# Patient Record
Sex: FEMALE | Race: WHITE
[De-identification: names, ages, dates, MRNs, and addresses within clinical notes are randomized per-mention and may not be internally consistent; named-entity substitution may affect disease eponyms.]

---

## 2017-08-11 ENCOUNTER — HOSPITAL ENCOUNTER (OUTPATIENT)
Dept: HOSPITAL 53 - M LAB REF | Age: 62
End: 2017-08-11
Attending: NURSE PRACTITIONER
Payer: COMMERCIAL

## 2017-08-11 DIAGNOSIS — Z02.1: Primary | ICD-10-CM

## 2018-01-04 ENCOUNTER — HOSPITAL ENCOUNTER (OUTPATIENT)
Dept: HOSPITAL 53 - M LAB REF | Age: 63
End: 2018-01-04
Attending: NURSE PRACTITIONER
Payer: COMMERCIAL

## 2018-01-04 DIAGNOSIS — N76.0: Primary | ICD-10-CM

## 2020-05-19 ENCOUNTER — HOSPITAL ENCOUNTER (OUTPATIENT)
Dept: HOSPITAL 53 - M LAB REF | Age: 65
End: 2020-05-19
Attending: NURSE PRACTITIONER
Payer: MEDICARE

## 2020-05-19 DIAGNOSIS — N39.0: Primary | ICD-10-CM

## 2020-06-03 ENCOUNTER — HOSPITAL ENCOUNTER (OUTPATIENT)
Dept: HOSPITAL 53 - M LAB REF | Age: 65
End: 2020-06-03
Attending: NURSE PRACTITIONER
Payer: MEDICARE

## 2020-06-03 DIAGNOSIS — N39.0: Primary | ICD-10-CM

## 2020-06-03 LAB
APPEARANCE UR: CLEAR
BACTERIA UR QL AUTO: NEGATIVE
BILIRUB UR QL STRIP.AUTO: NEGATIVE
GLUCOSE UR QL STRIP.AUTO: NEGATIVE MG/DL
HGB UR QL STRIP.AUTO: NEGATIVE
KETONES UR QL STRIP.AUTO: NEGATIVE MG/DL
LEUKOCYTE ESTERASE UR QL STRIP.AUTO: NEGATIVE
NITRITE UR QL STRIP.AUTO: NEGATIVE
PH UR STRIP.AUTO: 6 UNITS (ref 5–9)
PROT UR QL STRIP.AUTO: NEGATIVE MG/DL
RBC # UR AUTO: 1 /HPF (ref 0–3)
SP GR UR STRIP.AUTO: 1 (ref 1–1.03)
SQUAMOUS #/AREA URNS AUTO: 0 /HPF (ref 0–6)
UROBILINOGEN UR QL STRIP.AUTO: 0.2 MG/DL (ref 0–2)
WBC #/AREA URNS AUTO: 0 /HPF (ref 0–3)

## 2020-06-16 ENCOUNTER — HOSPITAL ENCOUNTER (OUTPATIENT)
Dept: HOSPITAL 53 - M WHC | Age: 65
End: 2020-06-16
Attending: NURSE PRACTITIONER
Payer: MEDICARE

## 2020-06-16 DIAGNOSIS — Z12.31: Primary | ICD-10-CM

## 2020-06-16 DIAGNOSIS — M85.80: ICD-10-CM

## 2020-06-16 NOTE — REPMRS
Patient History

The patient states she had a clinical breast exam in May 

2020.Family history of breast cancer at age 40 in maternal 

cousin.

 

Digital Woman Screen Mammo: June 16, 2020 - Exam #: 

[id number]

Bilateral CC and MLO view(s) were taken.

 

Technologist: Louann Galvez, Technologist

Prior study comparison: January 9, 2018, digital woman screen 

mammo performed at [facility] 

[place].  November 3, 2016, bilateral digital mammo screening 

bilat, performed at [facility].  April 21, 2014, 

bilateral digital mammo screening bilat, performed at [facility].

 

FINDINGS: The breast tissue is heterogeneously dense.  This may 

lower the sensitivity of mammography.  The Volpara volumetric 

breast density category is: C. There is a stable nodular density 

on the right unchanged from multiple prior studies. There is a 

moderate amount of heterogeneously dense fibroglandular tissue 

which is fairly symmetric. There is no interval development of 

dominant mass, architectural distortion, or grouped 

microcalcification typical of malignancy. There has been no 

change in the appearance of the mammogram from the prior studies.

3-D tomosynthesis shows no additional findings.

 

Assessment: BI-RADS/ACR category 2 mammogram. Benign Findings.

 

Recommendation

Routine screening mammogram of both breasts in 1 year (for women 

over age 40).

This patient's Lifetime Breast Cancer RIsk is estimated at 3.6 %.

This mammogram was interpreted with the aid of an FDA-approved 

computer-aided dectection system.

 

Electronically Signed By: Erlin Dalton MD 06/16/20 0846

## 2020-06-18 NOTE — DEXA
AP SPINE   L1 - L4   1.034   -1.3       0.3

LT FEMUR   TOTAL   0.873   -1.1       0.2

LT NECK      0.819   -1.6      -0.1

RT FEMUR   TOTAL   0.898   -0.9       0.4      

RT NECK      0.821   -1.6      -0.1

TOTAL BODY   TOTAL

OTHER



COMMENTS:

There is low bone density of the spine and hips.

The density of the spine has increased 5.5% since the initial exam on 
06/11/1998.

The increased 10.6% since the most recent exam on 01/09/2018.

The density of the left hip has decreased 2.3% since the initial exam on 
06/11/1998.

The density of the left hip is 0% since the most recent exam on 01/19/2018.

The density of the right hip has decreased 3.8% since the initial exam on 
06/11/1998.

The density of the right hip has increased 4.5% since the most recent exam on 
01/19/2018.



FOLLOW-UP:

Recommendation for the next bone density exam: 2 years.



NO

## 2020-12-03 ENCOUNTER — HOSPITAL ENCOUNTER (OUTPATIENT)
Dept: HOSPITAL 53 - M LABSMTC | Age: 65
End: 2020-12-03
Attending: PEDIATRICS
Payer: SELF-PAY

## 2020-12-03 DIAGNOSIS — Z20.828: Primary | ICD-10-CM

## 2020-12-31 ENCOUNTER — HOSPITAL ENCOUNTER (OUTPATIENT)
Dept: HOSPITAL 53 - M WUC | Age: 65
End: 2020-12-31
Attending: NURSE PRACTITIONER
Payer: SELF-PAY

## 2020-12-31 DIAGNOSIS — N39.0: Primary | ICD-10-CM

## 2021-03-16 ENCOUNTER — HOSPITAL ENCOUNTER (OUTPATIENT)
Dept: HOSPITAL 53 - M LAB | Age: 66
End: 2021-03-16
Attending: INTERNAL MEDICINE
Payer: MEDICARE

## 2021-03-16 DIAGNOSIS — K50.812: Primary | ICD-10-CM

## 2021-03-16 DIAGNOSIS — Z11.59: ICD-10-CM

## 2021-03-16 LAB
BASOPHILS # BLD AUTO: 0.1 10^3/UL (ref 0–0.2)
BASOPHILS NFR BLD AUTO: 0.6 % (ref 0–1)
CRP SERPL-MCNC: 0.66 MG/DL (ref 0–0.3)
EOSINOPHIL # BLD AUTO: 0.1 10^3/UL (ref 0–0.5)
EOSINOPHIL NFR BLD AUTO: 1.1 % (ref 0–3)
ERYTHROCYTE [SEDIMENTATION RATE] IN BLOOD BY WESTERGREN METHOD: 44 MM/HR (ref 0–30)
FOLATE SERPL-MCNC: 20.3 NG/ML
HCT VFR BLD AUTO: 36.6 % (ref 36–47)
HGB BLD-MCNC: 11.7 G/DL (ref 12–15.5)
IRON SATN MFR SERPL: 9.7 % (ref 13.2–45)
IRON SERPL-MCNC: 37 UG/DL (ref 50–170)
LYMPHOCYTES # BLD AUTO: 2.1 10^3/UL (ref 1.5–5)
LYMPHOCYTES NFR BLD AUTO: 20.5 % (ref 24–44)
MCH RBC QN AUTO: 27.1 PG (ref 27–33)
MCHC RBC AUTO-ENTMCNC: 32 G/DL (ref 32–36.5)
MCV RBC AUTO: 84.7 FL (ref 80–96)
MONOCYTES # BLD AUTO: 0.7 10^3/UL (ref 0–0.8)
MONOCYTES NFR BLD AUTO: 7 % (ref 2–8)
NEUTROPHILS # BLD AUTO: 7.1 10^3/UL (ref 1.5–8.5)
NEUTROPHILS NFR BLD AUTO: 70.4 % (ref 36–66)
PLATELET # BLD AUTO: 510 10^3/UL (ref 150–450)
RBC # BLD AUTO: 4.32 10^6/UL (ref 4–5.4)
TIBC SERPL-MCNC: 383 UG/DL (ref 250–450)
VIT B12 SERPL-MCNC: 764 PG/ML
WBC # BLD AUTO: 10.1 10^3/UL (ref 4–10)

## 2021-04-01 ENCOUNTER — HOSPITAL ENCOUNTER (OUTPATIENT)
Dept: HOSPITAL 53 - M LAB | Age: 66
End: 2021-04-01
Attending: INTERNAL MEDICINE
Payer: MEDICARE

## 2021-04-01 DIAGNOSIS — K50.812: Primary | ICD-10-CM

## 2021-04-01 DIAGNOSIS — Z93.2: ICD-10-CM

## 2021-04-01 LAB
BUN SERPL-MCNC: 16 MG/DL (ref 7–18)
CREAT SERPL-MCNC: 0.8 MG/DL (ref 0.55–1.3)
GFR SERPL CREATININE-BSD FRML MDRD: > 60 ML/MIN/{1.73_M2} (ref 45–?)

## 2021-04-07 ENCOUNTER — HOSPITAL ENCOUNTER (OUTPATIENT)
Dept: HOSPITAL 53 - M RAD | Age: 66
End: 2021-04-07
Attending: INTERNAL MEDICINE
Payer: MEDICARE

## 2021-04-07 DIAGNOSIS — Z90.49: ICD-10-CM

## 2021-04-07 DIAGNOSIS — K50.812: Primary | ICD-10-CM

## 2021-04-07 PROCEDURE — 74177 CT ABD & PELVIS W/CONTRAST: CPT

## 2021-04-07 NOTE — REP
INDICATION:

CROHN'S DX



COMPARISON:

03/09/2014.



TECHNIQUE:

CT Scan of the abdomen and pelvis was performed with intravenous administration of 100

cc of Isovue 370, and volumen oral contrast. Sagittal and coronal reconstruction

images are performed.



FINDINGS:

Lung bases: Unremarkable.

Liver:  Normal

Gallbladder: Prior cholecystectomy.  There is expected prominence of the intrahepatic

and extrahepatic bile ducts.  The common bile duct measures 11 mm in maximum diameter.

Spleen:  Normal.

Adrenals:  Normal.

Pancreas: Normal.

Kidneys:  Normal.

Small and large bowel: There has been a prior colectomy.  A right ileostomy is

present.  There is a suture line in the distal ileum, in the pelvis just to the left

of midline.  Just proximal to the suture line there is appears to be an area of focal

wall thickening and moderate luminal narrowing, with moderate dilatation of the more

proximal small bowel loop.  There also appears to be focal wall thickening and

suspected stricture ring of the ileum just distal to the suture line.  Another focal

area of wall thickening and stricturing is suspected approximately 5 cm distal to

that.  There is diffuse wall thickening of the more distal ileum with suspected focal

stricture of the distal ileum located in the more inferior ventral hernia.

Free fluid:  None.

Abdominal aorta: No aneurysm or dissection.

Adenopathy: None.

Appendix: Not inflamed.

Osseous structures:  There are degenerative changes of the spine without compression

deformity.

Pelvis: No mass.

There are 3 wide ventral hernias containing noninflamed fat and bowel loops.



IMPRESSION:

Postsurgical changes with several areas of mucosal fold and wall thickening of distal

ileum, and intermittent moderate small bowel dilatation, suggesting partial

obstruction at these areas of luminal narrowing and strictures.





<Electronically signed by Merrill Cuadra > 04/07/21 [id number]

## 2021-04-22 ENCOUNTER — HOSPITAL ENCOUNTER (OUTPATIENT)
Dept: HOSPITAL 53 - M INFU | Age: 66
Discharge: HOME | End: 2021-04-22
Attending: INTERNAL MEDICINE
Payer: MEDICARE

## 2021-04-22 VITALS — DIASTOLIC BLOOD PRESSURE: 63 MMHG | SYSTOLIC BLOOD PRESSURE: 131 MMHG

## 2021-04-22 VITALS — WEIGHT: 116.23 LBS | BODY MASS INDEX: 22.23 KG/M2 | HEIGHT: 60.5 IN

## 2021-04-22 DIAGNOSIS — K50.90: Primary | ICD-10-CM

## 2021-04-22 DIAGNOSIS — Z88.1: ICD-10-CM

## 2021-04-22 PROCEDURE — 96365 THER/PROPH/DIAG IV INF INIT: CPT

## 2021-06-28 ENCOUNTER — HOSPITAL ENCOUNTER (OUTPATIENT)
Dept: HOSPITAL 53 - M RAD | Age: 66
End: 2021-06-28
Attending: INTERNAL MEDICINE
Payer: MEDICARE

## 2021-06-28 DIAGNOSIS — R16.1: Primary | ICD-10-CM

## 2021-06-28 NOTE — REP
INDICATION:

UPPER ABD PAIN LIVER SPLEEN ? SPLEENOMEGALY.



COMPARISON:

None.



TECHNIQUE:

Transabdominal ultrasound



FINDINGS:

Multiple ultrasonographic images of the liver show the hepatic parenchymal echo

pattern to be within normal limits.  There is no intrahepatic or extrahepatic ductal

dilatation.  The common bile duct measures 7 mm.  The imaged portion of the pancreas

is within normal limits.

The spleen measures  8.9 x 7.4 x4.8 cm.  The volumetric index calculation is 316.

This is within the normal range.  No splenic or perisplenic abnormalities are noted.

The right kidney measures 9.9 x 5.6 x 3.6 cm.  The renal cortical echotexture is

within normal limits.  Corticomedullary differentiation is preserved.  There is no

hydronephrosis.  There are no masses.  The left kidney measures 9.6 x 3.7 x 5.1 cm.

The renal cortical echotexture is within normal limits.  Corticomedullary

differentiation is preserved.  There is no hydronephrosis.  There are no masses.  The

imaged portion of the abdominal aorta is within normal limits.  There is no evidence

of free fluid.



IMPRESSION:

Within normal limits





<Electronically signed by Lj Leone > 06/28/21 0979

## 2021-08-05 ENCOUNTER — HOSPITAL ENCOUNTER (OUTPATIENT)
Dept: HOSPITAL 53 - M LAB | Age: 66
End: 2021-08-05
Attending: INTERNAL MEDICINE
Payer: MEDICARE

## 2021-08-05 DIAGNOSIS — K50.812: Primary | ICD-10-CM

## 2021-08-05 LAB
ALBUMIN SERPL BCG-MCNC: 3.3 GM/DL (ref 3.2–5.2)
ALT SERPL W P-5'-P-CCNC: 30 U/L (ref 12–78)
BASOPHILS # BLD AUTO: 0.1 10^3/UL (ref 0–0.2)
BASOPHILS NFR BLD AUTO: 0.6 % (ref 0–1)
BILIRUB CONJ SERPL-MCNC: 0.1 MG/DL (ref 0–0.2)
BILIRUB SERPL-MCNC: 0.4 MG/DL (ref 0.2–1)
BUN SERPL-MCNC: 13 MG/DL (ref 7–18)
CALCIUM SERPL-MCNC: 9.6 MG/DL (ref 8.8–10.2)
CHLORIDE SERPL-SCNC: 106 MEQ/L (ref 98–107)
CO2 SERPL-SCNC: 28 MEQ/L (ref 21–32)
CREAT SERPL-MCNC: 0.84 MG/DL (ref 0.55–1.3)
CRP SERPL-MCNC: 0.64 MG/DL (ref 0–0.3)
EOSINOPHIL # BLD AUTO: 0.1 10^3/UL (ref 0–0.5)
EOSINOPHIL NFR BLD AUTO: 0.7 % (ref 0–3)
ERYTHROCYTE [SEDIMENTATION RATE] IN BLOOD BY WESTERGREN METHOD: 39 MM/HR (ref 0–30)
FOLATE SERPL-MCNC: > 24 NG/ML
GFR SERPL CREATININE-BSD FRML MDRD: > 60 ML/MIN/{1.73_M2} (ref 45–?)
GLUCOSE SERPL-MCNC: 126 MG/DL (ref 70–100)
HCT VFR BLD AUTO: 36.9 % (ref 36–47)
HGB BLD-MCNC: 11.9 G/DL (ref 12–15.5)
IRON SATN MFR SERPL: 7.8 % (ref 13.2–45)
IRON SERPL-MCNC: 33 UG/DL (ref 50–170)
LYMPHOCYTES # BLD AUTO: 2.3 10^3/UL (ref 1.5–5)
LYMPHOCYTES NFR BLD AUTO: 21.9 % (ref 24–44)
MCH RBC QN AUTO: 28.8 PG (ref 27–33)
MCHC RBC AUTO-ENTMCNC: 32.2 G/DL (ref 32–36.5)
MCV RBC AUTO: 89.3 FL (ref 80–96)
MONOCYTES # BLD AUTO: 0.9 10^3/UL (ref 0–0.8)
MONOCYTES NFR BLD AUTO: 8.1 % (ref 2–8)
NEUTROPHILS # BLD AUTO: 7.1 10^3/UL (ref 1.5–8.5)
NEUTROPHILS NFR BLD AUTO: 68.2 % (ref 36–66)
PLATELET # BLD AUTO: 454 10^3/UL (ref 150–450)
POTASSIUM SERPL-SCNC: 4.4 MEQ/L (ref 3.5–5.1)
PROT SERPL-MCNC: 6.9 GM/DL (ref 6.4–8.2)
RBC # BLD AUTO: 4.13 10^6/UL (ref 4–5.4)
SODIUM SERPL-SCNC: 140 MEQ/L (ref 136–145)
TIBC SERPL-MCNC: 424 UG/DL (ref 250–450)
VIT B12 SERPL-MCNC: 598 PG/ML
WBC # BLD AUTO: 10.4 10^3/UL (ref 4–10)

## 2021-08-16 LAB — CALPROTECTIN STL-MCNT: 40 UG/G (ref 0–120)

## 2021-08-17 LAB — ELASTASE PANC STL-MCNT: (no result) UG/G

## 2021-08-19 ENCOUNTER — HOSPITAL ENCOUNTER (OUTPATIENT)
Dept: HOSPITAL 53 - M RAD | Age: 66
End: 2021-08-19
Attending: INTERNAL MEDICINE
Payer: MEDICARE

## 2021-08-19 DIAGNOSIS — K50.812: Primary | ICD-10-CM

## 2021-10-23 ENCOUNTER — HOSPITAL ENCOUNTER (OUTPATIENT)
Dept: HOSPITAL 53 - M LABSMTC | Age: 66
End: 2021-10-23
Attending: ANESTHESIOLOGY
Payer: MEDICARE

## 2021-10-23 DIAGNOSIS — Z20.822: ICD-10-CM

## 2021-10-23 DIAGNOSIS — Z01.812: Primary | ICD-10-CM

## 2021-10-25 ENCOUNTER — HOSPITAL ENCOUNTER (OUTPATIENT)
Dept: HOSPITAL 53 - M WHC | Age: 66
End: 2021-10-25
Attending: NURSE PRACTITIONER
Payer: MEDICARE

## 2021-10-25 DIAGNOSIS — Z80.3: ICD-10-CM

## 2021-10-25 DIAGNOSIS — Z12.31: Primary | ICD-10-CM

## 2021-10-25 NOTE — REPMRS
Patient History

The patient states she had a clinical breast exam in October 2021.

Family history of breast cancer at age 40 in maternal cousin.

Patient states no breast complaints today. Patient has signed MRS

History Sheet.

 

Digital Woman Screen Mammo: October 25, 2021 - Exam #: 

[id number]

Bilateral CC and MLO view(s) were taken.

 

Technologist: Jenny Baumann, Technologist

Prior study comparison: June 16, 2020, bilateral digital woman 

screen mammo performed at [facility] 

[facility].  January 9, 2018, digital woman screen mammo performed at 

[facility].

 

FINDINGS: There are scattered fibroglandular densities.  

Screening.

 

Digital screening (2D) mammography was performed bilaterally in 

the CC and MLO projections. Additionally, breast tomosynthesis 

(3D mammography) was performed bilaterally in the CC and MLO 

projections. Todays exam was compared to the prior exam/exams.

 

By history, the patient has no complaints of a palpable breast 

abnormality or other significant breast complaints.

 

The Volpara volumetric breast density category is B, there are 

scattered areas of fibroglandular densities.

 

The breasts are unchanged in size and shape. There are no 

tj-soft tissue densities or spiculated masses. There is no 

internal architectural distortion. There are no suspicious 

tj-calcific clusters. Skin thickening or nipple retraction is 

not present.

 

IMPRESSION:

 

BI-RADS Category 2- Benign Findings. There is no evidence of 

malignant alteration of the breasts. Followup examination 

recommended in one year.

 

The lifetime Tyrer-Cuzick score is  3.4%

 

This mammogram was read with the assistance of [employer] Power[employer] 

AMP,an FDA approved computer aided detection system for 

mammography.

 

Negative x-ray reports should not delay surgical consultation if 

a dominant or clinically suspicious mass is present.

 

Not all breast cancers can be identified by mammography. 

Therefore, we recommend that you continue to perform regular 

breast self-examination and physical examination and then 

promptly contact your physician of any concerns or changes.

 

Adenosis and dense breasts may obscure an underlying neoplasm.

No significant changes when compared with prior studies.

 

Assessment: BI-RADS/ACR category 2 mammogram. Benign Findings.

 

Recommendation

Routine screening mammogram of both breasts in 1 year.

 

Electronically Signed By: Armando Andrade MD 10/25/21 9555

## 2021-10-28 ENCOUNTER — HOSPITAL ENCOUNTER (OUTPATIENT)
Dept: HOSPITAL 53 - M OPP | Age: 66
Discharge: HOME | End: 2021-10-28
Attending: INTERNAL MEDICINE
Payer: MEDICARE

## 2021-10-28 VITALS — HEIGHT: 60.6 IN | BODY MASS INDEX: 23.49 KG/M2 | WEIGHT: 122.8 LBS

## 2021-10-28 VITALS — DIASTOLIC BLOOD PRESSURE: 55 MMHG | SYSTOLIC BLOOD PRESSURE: 105 MMHG

## 2021-10-28 DIAGNOSIS — Z80.1: ICD-10-CM

## 2021-10-28 DIAGNOSIS — Z79.899: ICD-10-CM

## 2021-10-28 DIAGNOSIS — Z79.82: ICD-10-CM

## 2021-10-28 DIAGNOSIS — Z88.1: ICD-10-CM

## 2021-10-28 DIAGNOSIS — R93.3: ICD-10-CM

## 2021-10-28 DIAGNOSIS — Z93.2: ICD-10-CM

## 2021-10-28 DIAGNOSIS — Z79.84: ICD-10-CM

## 2021-10-28 DIAGNOSIS — K50.012: Primary | ICD-10-CM

## 2021-10-28 NOTE — CCD
Summarization of Episode Note

                             Created on: 10/12/2021



NELSONALEX MARVA

External Reference #: [id number]

: 1955

Sex: Female



Demographics





                          Address                   [address] [place]  [address]

 

                          Home Phone                [phone]

 

                          Preferred Language        Unknown

 

                          Marital Status            Unknown

 

                          Restorationism Affiliation     Unknown

 

                          Race                      White

 

                          Ethnic Group              Not  or 





Author





                          Author                    [facility]

ems

 

                          Organization              [facility] [employer] [facility]

ems

 

                          Address                   Unknown

 

                          Phone                     Unavailable







Support





                Name            Relationship    Address         Phone

 

                    ALEX NELSON                [address] [place]  [address]                    [phone]

 

                    Amelie Lalo       ODALYS                [address] [place]  [address]                    [phone]







Care Team Providers





                    Care Team Member Name Role                Phone

 

                    Jodi Meadows Unavailable         [phone]







PROBLEMS





          Type      Condition ICD9-CM Code QEM70-KV Code Onset Dates Condition S

tatus W/U 

Status              Risk                SNOMED Code         Notes

 

       Problem Colostomy care        Z43.3         Active confirmed        87392

  







ALLERGIES





                    Allergen (clinical drug ingredient) Drug/Non Drug Allergy do

cumented on EMR 

Reaction            Allergy Type        Onset Date          Status

 

           tetracycline Tetracycline Rash       Drug Allergy            Active







ENCOUNTERS from 1955 to 2021-10-12





             Encounter    Location     Date         Provider     Diagnosis

 

                    [facility]     [address] [phone] [place] [address] 11 Oct, 

2021                      Jodi Dori          







IMMUNIZATIONS

No Information



SOCIAL HISTORY

Tobacco Use:



                    Social History Observation Description         Date

 

                    Details (start date - stop date) Never Smoker         



Sex Assigned At Birth:



                          Social History Observation Description

 

                          Sex Assigned At Birth     Unknown



Education:



                    Question            Answer              Notes

 

                    Level of Education: High School          



Language:



                    Question            Answer              Notes

 

                    Languages spoken:   English              



Sikh:



                    Question            Answer              Notes

 

                    Sikh                                No Confucianist beliefs

 that would impact health care.



Alcohol Screening:



                    Question            Answer              Notes

 

                    Did you have a drink containing alcohol in the past year? No

                   

 

                    Points              0                    

 

                    Interpretation      Negative             



Tobacco Use:



                    Question            Answer              Notes

 

                    Are you a:          never smoker         







REASON FOR REFERRAL

No Information



VITAL SIGNS

No information



MEDICATIONS





           Medication SIG (Take, Route, Frequency, Duration) Notes      Start Da

te End Date   

Status

 

                          buPROPion HCl ER (XL) 150 MG 1 tablet in the morning O

rally Once a day for 30 

day(s)                                                          Active

 

           Omeprazole 20 MG 1 capsule Orally Once a day                         

         Active

 

           Vitamin D 25 MCG (1000 UT) 2 tablet Orally Once a day                

                  Active

 

           Stelara 90 MG/ML as directed Subcutaneous every 8 weeks              

                    Active

 

           Ocuvite-Lutein - as directed Orally                                  

Active

 

           Atorvastatin Calcium 20 MG 1 tablet Orally Once a day for 30 day(s)  

                                Active

 

           Melatonin 10 MG 1 tablet in the evening Orally Once a day            

                      Active

 

           busPIRone HCl 15 MG 1 tablet Orally Twice a day                      

            Active

 

           Fish Oil 1200 MG 2 capsule Orally Once a day                         

         Active

 

                          Fluticasone Propionate 50 MCG/ACT 1 spray in each nost

ril Nasally Once a day for

30 day(s)                                                       Active

 

           metFORMIN HCl  MG 2 tablet with evening meal Orally Once a day 

                                 

Active

 

           Aspirin 81 MG 1 tablet Orally Once a day for 30 day(s)               

                   Active

 

           Lisinopril 2.5 MG 1 tablet Orally Once a day for 30 day(s)           

                       Active

 

           Fortify Probiotic Womens Ex St - as directed Orally daily            

                      Active

 

           Ferrous Sulfate 325 (65 Fe) MG 1 tablet Orally Once a day for 30 day(

s)                                  

Active

 

           Womens One Daily - 1 tablet Orally daily                             

     Active







PROCEDURES

No Information



RESULTS

No Results



REASON FOR VISIT

Cancel Appt



MEDICAL (GENERAL) HISTORY





                    Type                Description         Date

 

                    Medical History     PE                   

 

                    Medical History     anxiety              

 

                    Medical History     DM                   

 

                    Medical History     crohn's disease      

 

                    Medical History     HTN                  

 

                    Surgical History    Hernia               

 

                    Surgical History    Tubal ligation       

 

                    Surgical History    Tonsillectomy        

 

                    Surgical History    Cholecystectomy      

 

                    Surgical History    Colonoscopy         2009

 

                    Surgical History    Ileosotmy           2007

 

                    Surgical History    Cataract's           

 

                    Hospitalization History dehydration- [facility]     

 

                    Hospitalization History surgery related      







Goals Section

No Information



Health Concerns

No Information



MEDICAL EQUIPMENT

No Information



MENTAL STATUS

No Information



FUNCTIONAL STATUS

No Information



ASSESSMENTS

No Information



PLAN OF TREATMENT

Next Appt



                                        Details

 

                                        Provider Name:James Stillerman, 2021-10-

21 01:45:00 PM, [address] [place] [address], 

[phone], [place], [address], [phone]







Insurance Providers





             Payer Name   Payer Address Payer Phone  Insured Name Patient Relati

onship to 

Insured                   Coverage Start Date       Coverage End Date

 

                MEDICARE Part A and B [address]  [facility] [phone]

[phone]    

ALEX NELSON    self                                     

 

                [facility] [address] [address]  [facility] [address] [phone]

[phone]    ALEX NELSON               [id number]

## 2021-10-28 NOTE — CCD
Continuity of Care Document (CCD)

                             Created on: 2021



Vanesa Kinsey

External Reference #: [id number]

: 1955

Sex: Female



Demographics





                          Address                   [address] [place]  [address]

 

                          Home Phone                [phone]

 

                          Preferred Language        Unknown

 

                          Marital Status            Unknown

 

                          Scientology Affiliation     Unknown

 

                          Race                      White

 

                          Ethnic Group              Not  or 





Author





                          Author                    Vanesa Douglas

 

                          Organization              Unknown

 

                          Address                   [address]  [address]



 

                          Phone                     [phone]







Care Team Providers





                    Care Team Member Name Role                Phone

 

                    Jessica Cervantes MD    AUTM                [phone]

 

                    Olya Bangura ANP   AUTM                [phone]

 

                    Israel Farmer MD AUTM                [phone]







Problems





                    Active Problems     Provider            Date

 

                    Pulmonary embolism  NIMA Douglas    Onset: 04/10/2012

 

                    Gastroesophageal reflux disease LUCRETIA Ring Onset

: 04/10/2012

 

                    Pure hypercholesterolemia LUCRETIA Ring Onset: 

 

                    Type 2 diabetes mellitus LUCRETIA Ring Onset: 04/10

/2012

 

                    Anemia              Marc Rodriguez D.O. Onset: 04/10/

2012







Social History





                Type            Date            Description     Comments

 

                Birth Sex                       Unknown          

 

                ETOH Use                        Occasionally consumes beer  

 

                Tobacco Use     Start: Unknown End: Unknown Patient is a former 

smoker SMOKED FOR 

15YRS 1 MAGGY A DAY quit age 37 







Allergies, Adverse Reactions, Alerts





             Active Allergies Criticality  Reaction | Severity Comments     Date

 

             Tetracycline Unable to assess criticality              rash        

 2010







Medications





           Active Medications SIG        Qnty       Indications Ordering Provide

r Date

 

                          Alprazolam                     0.25mg Tablets         

          1/2 - one tablet

twice daily as needed anxiety 10tabs                          NIMA Douglas 0

2021

 

                          Zolpidem Tartrate                     5mg Tablets     

              take one 

tablet by mouth at bedtime as needed for sleep maximum daily dose = 1 tablet mdd
1               7tabs                           NIMA Douglas 06/10/2021

 

                          Estrace                     0.1mg/GM Cream            

       1/4 applicatorful 

vaginally at bedtime daily x 14 days then three times a week 42.500gm           

                     Olya Bates [facility]                               2021

 

                    Lutein                     20mg Capsules                   1

 by mouth every day 

                                        Olya Bates [facility]    2021

 

                          Lisinopril                     2.5mg Tablets          

         1 by mouth every 

day             90tabs          E11.21          [facility]

 

           Calcium + D 1200MG/D                         Josephine Escalante M.D. 

2019

 

                                        Bupropion Hydrochloride ER (XL)         

            150mg Tablets ER 24HR       

             take 1 tablet by mouth once daily 90tabs                    Olya Bates [facility] 

10/15/2019

 

                                        Fluticasone Propionate                  

   50mcg/Act Suspension                 

             2 sprays each nostril daily X 2 Weeks Then prn 16gm                

      [facility] 

2019

 

                                        Fortify Probiotic Womens Extra Strength 

                     Capsules [facility] 20

18

 

                          Buspirone HCL                     15mg Tablets        

           Take 1 Tablet 

By Mouth Twice Daily 180tabs                         Olya Bates [facility] 2018

 

                          Vitamin D                     1000Unit Tablets        

           2 by mouth 

every day                                       [facility] 

8

 

                          Onetouch Ultra Blue                      Strips       

            use twice 

daily to check blood sugar dx e11.9 200units                        [facility]

 

                          Womens One Daily                      Tablets         

          1 by mouth every

day                                             [facility] 

6

 

                          Metformin HCL ER                     500mg Tablets ER 

24HR                   

take 2 tablets by mouth once daily with the largest meal of the day 180tabs     

                            

Olya Bates [facility]                        2016

 

                          Aspirin Childrens                     81mg Chewtabs   

                1 by mouth

every hs                                        [facility] 2016

 

                          Loperamide HCL                     2mg Capsules       

            4 capsules at 

bedtime by mouth may take additional 4 capsules in daytime - not to exceed 16mg 
daily           240caps                         Olya Bates [facility] 2016

 

                          Ferrous Sulfate                     325(65Fe) mg Table

ts DR                   1 

by mouth every day                                 [facility] 

6

 

             Melatonin                     10mg Tablets                   1 hs p

o                                [facility]                             2016

 

                          Atorvastatin Calcium                     20mg Tablets 

                  Take 1 

Tablet By Mouth Every Day 90tabs                          Olya Bates [facility] 

 

                          Omeprazole                     20mg Capsules DR       

            take 1 capsule

by mouth twice daily. maximum daily dose is 2 180caps                         An

n Krystal Marley, NIMA 

2010

 

                          Claritin                     10mg Tablets             

      1 by mouth every day

                                                Unknown         

 

             Similasan Earache Relief                      Solution             

                                             

Unknown                                 

 

           Omega-3 Drops For Eyes                                  Unknown    

 

                          Budesonide                     3mg Caps DR Part       

            3 tabs daily 

before dinner for four weeks, then 2 tabs daily for four weeks, then 1 tab daily
for remaining four weeks- Dr. Farmer's                                 Unknow

n         

 

                          Magnesium                     300mg Capsules          

         1 by mouth every 

day                                             Unknown         

 

                Stelara                     90mg/ml Soln Prefill Syringe        

                                            

                          Unknown                   







Medications Administered in Office





           Medication SIG        Qnty       Indications Ordering Provider Date

 

                          Covid-19 vaccine, Unspecified                      Inj

ection                    

                                                Unknown         2021

 

                          Covid-19 vaccine, Unspecified                      Inj

ection                    

                                                Unknown         2021

 

                                        Immunization Adminstration,1 Vaccine/Tox

oid                      Injection      

                                                    Cait Henson FNP 20

20







Immunizations





           CPT Code   Status     Date       Vaccine    Reaction   Lot #

 

           [id number]      Given      2020 Pneumovax 23            [id number]

 

           [id number]      Given      2020 Adacel- Tetanus Diphtheria Pertussis

 (Age63 & Under)            

[id number]

 

           U-Flu      Given      2019 Influenza,Unspecified             

 

           U-PneuC    Given      07/15/2019 Prevnar 13             

 

                [id number]           Given           07/15/2019      Shingrix Zoster 

Vaccine (HZV), Recombinant, Subunit, 

Adjuvanted                                           

 

           [id number]      Given      05/15/2019 Shingrix               

 

           U-Flu      Given      10/11/2018 Influenza,Unspecified             

 

           34038      Given      2017 PPD        0 mm read by Lashaun ortega, SIRENA [id number]

 

                 Given      2016 Fluvirin Virus Vaccine            16

[id number]

 

           [id number]      Given      10/10/2003 Pneumovax 23             







Vital Signs





                Date            Vital           Result          Comment

 

                2021 10:32am BP Systolic     130 mmHg         

 

                    BP Diastolic        64 mmHg              

 

                    Heart Rate          88 /min              

 

                    Height              61.25 inches        5'1.25"

 

                    Weight              125.00 lb            

 

                    O2 % BldC Oximetry  96 %                 

 

                    BMI (Body Mass Index) 23.4 kg/m2           

 

                2021  8:40am BP Systolic     128 mmHg         

 

                    BP Diastolic        68 mmHg              

 

                    Heart Rate          74 /min              

 

                    Height              61.25 inches        5'1.25"

 

                    Weight              118.50 lb            

 

                    O2 % BldC Oximetry  98 %                RM Air

 

                    BMI (Body Mass Index) 22.2 kg/m2           







Results





        Test    Acquired Date Facility Test    Result  H/L     Range   Note

 

                    Laboratory test finding 2021          [facility]

                                        [address] [place] [address] [phone] Erythrocyte Sedimentation Rate 39 mm/hr   High       0

-30        

 

                    Liver Profile       2021          [facility]

nter

                                        [address] [place] [address] [phone] Ast/Sgot   20 U/L     Normal     7-37        

 

             Alt/SGPT     30 U/L       Normal       12-78         

 

             Alkaline Phosphatase 95 U/L       Normal               

 

             Bilirubin,Total 0.4 mg/dL    Normal       0.2-1.0       

 

             Bilirubin,Direct 0.1 mg/dL    Normal       0.0-0.2       

 

             Total Protein 6.9 GM/DL    Normal       6.4-8.2       

 

             Albumin      3.3 GM/DL    Normal       3.2-5.2       

 

             Albumin/Globulin Ratio 0.9          Low          1.2-2.2       

 

                    Basic Metabolic Profile 2021          [address] [address] [phone] Glucose, Fasting 126 mg/dL  High             

 

             Blood Urea Nitrogen 13 mg/dL     Normal       7-18          

 

             Creatinine For GFR 0.84 mg/dL   Normal       0.55-1.30     

 

             Glomerular Filtration Rate > 60.0       Normal       >45          1

 

             Sodium Level 140 mEq/L    Normal       136-145       

 

             Potassium Serum 4.4 mEq/L    Normal       3.5-5.1       

 

             Chloride Level 106 mEq/L    Normal               

 

             Carbon Dioxide Level 28 mEq/L     Normal       21-32         

 

             Anion Gap    6 mEq/L      Low          8-16          

 

             Calcium Level 9.6 mg/dL    Normal       8.8-10.2      

 

                    Total Iron Binding Capacit 2021          [facility]

                                        [address] [place] [address] [phone] Iron (Fe)  33 g/dL  Low              

 

             Total Iron Binding Capacity 424 g/dL   Normal       250-450      

 

 

             Percent Saturation 7.8 %        Low          13.2-45.0     

 

                    Vitamin B12 & Folate 2021          [facility]

enter

                                        [address] [place] [address] [phone] Vitamin B12 Level 598 pg/mL  Normal                2

 

             Folate       > 24.0 NG/ML Normal                    3

 

                    Laboratory test finding 2021          [facility]

                                        [address] [place] [address] [phone] C Reactive Protein Quantitativ 0.64 mg/dL High       0

.00-0.30   

 

                    Fat Fecal Qualitative 2021          [address] [address] [phone] Fats Neutral Normal     Normal     .          4

 

             Fats Total   Normal       Normal       .            5

 

                    Laboratory test finding 2021          [address] [address] [phone] Pancreatic Elastase Stool TNP        Normal           

     6

 

             Calprotectin Stool 40 ug/g      Normal       0-120        7

 

                    CBC With Differential 2021          [address] [address] [phone] White Blood Count 10.4 10    High       4.0-10.0    

 

             Red Blood Count 4.13 10      Normal       4.00-5.40     

 

             Hemoglobin   11.9 g/dL    Low          12.0-15.5     

 

             Hematocrit   36.9 %       Normal       36.0-47.0     

 

             Mean Corpuscular Volume 89.3 fl      Normal       80.0-96.0     

 

             Mean Corpuscular Hemoglobin 28.8 pg      Normal       27.0-33.0    

 

 

             Mean Corpuscular HGB Conc 32.2 g/dL    Normal       32.0-36.5     

 

             Red Cell Distribution Width 19.7 %       High         11.5-14.5    

 

 

             Platelet Count, Automated 454 10       High         150-450       

 

             Neutrophils % 68.2 %       High         36.0-66.0     

 

             Lymph %      21.9 %       Low          24.0-44.0     

 

             Mono %       8.1 %        High         2.0-8.0       

 

             Eos %        0.7 %        Normal       0.0-3.0       

 

             Baso %       0.6 %        Normal       0.0-1.0       

 

             Immature Granulocyte % 0.5 %        Normal       0-3.0         

 

             Nucleated Red Blood Cell % 0.0 %        Normal       0-0           

 

             Neutrophils # 7.1 10       Normal       1.5-8.5       

 

             Lymph #      2.3 10       Normal       1.5-5.0       

 

             Mono #       0.9 10       High         0.0-0.8       

 

             Eos #        0.1 10       Normal       0.0-0.5       

 

             Baso #       0.1 10       Normal       0.0-0.2       

 

                    CBC With Differential 2021          [address] [address] [phone] White Blood Count 15.0 10    High       4.0-10.0    

 

             Red Blood Count 4.63 10      Normal       4.00-5.40     

 

             Hemoglobin   12.9 g/dL    Normal       12.0-15.5     

 

             Hematocrit   39.2 %       Normal       36.0-47.0     

 

             Mean Corpuscular Volume 84.7 fl      Normal       80.0-96.0     

 

             Mean Corpuscular Hemoglobin 27.9 pg      Normal       27.0-33.0    

 

 

             Mean Corpuscular HGB Conc 32.9 g/dL    Normal       32.0-36.5     

 

             Red Cell Distribution Width 19.1 %       High         11.5-14.5    

 

 

             Platelet Count, Automated 319 10       Normal       150-450       

 

             Neutrophils % 92.5 %       High         36.0-66.0     

 

             Lymph %      5.4 %        Low          24.0-44.0     

 

             Mono %       0.7 %        Low          2.0-8.0       

 

             Eos %        0.0 %        Normal       0.0-3.0       

 

             Baso %       0.1 %        Normal       0.0-1.0       

 

             Immature Granulocyte % 1.3 %        Normal       0-3.0         

 

             Nucleated Red Blood Cell % 0.0 %        Normal       0-0           

 

             Neutrophils # 13.9 10      High         1.5-8.5       

 

             Lymph #      0.8 10       Low          1.5-5.0       

 

             Mono #       0.1 10       Normal       0.0-0.8       

 

             Eos #        0.0 10       Normal       0.0-0.5       

 

             Baso #       0.0 10       Normal       0.0-0.2       

 

                    Laboratory test finding 2021          [facility]

                                        [address] [place] [address] [phone]  Jak2 Mutations For Path Sendou See Pathology Re 

<SEE NOTE>  

Normal                                              8

 

                    BCR/Abl Screen For CML Path 2021          [address] [address] [phone]  BCR/Abl Screen For CML Path So See Pathology Re 

<SEE NOTE>  

Normal                                              9

 

                    Laboratory test finding 2021          [address] [id number] [phone] Vitamin B12 Level 580 pg/mL  Normal     247-911    10

 

             Ferritin     19 NG/ML     Normal       8-252         

 

                    Total Iron Binding Capacit 2021          [address] [id number] [phone] Iron (Fe)  139 g/dL Normal           

 

             Total Iron Binding Capacity 494 g/dL   High         250-450      

 

 

             Percent Saturation 28.1 %       Normal       13.2-45.0     

 

                    Comprehensive Metabolic Profil 2021          [address] [id number] [phone] Glucose, Fasting 121 mg/dL  High             

 

             Blood Urea Nitrogen 27 mg/dL     High         7-18          

 

             Creatinine For GFR 1.00 mg/dL   Normal       0.55-1.30     

 

             Glomerular Filtration Rate 58.9         Normal       >45          1

1

 

             Sodium Level 137 mEq/L    Normal       136-145       

 

             Potassium Serum 4.2 mEq/L    Normal       3.5-5.1       

 

             Chloride Level 106 mEq/L    Normal               

 

             Carbon Dioxide Level 25 mEq/L     Normal       21-32         

 

             Anion Gap    6 mEq/L      Low          8-16          

 

             Calcium Level 8.9 mg/dL    Normal       8.8-10.2      

 

             Ast/Sgot     26 U/L       Normal       7-37          

 

             Alt/SGPT     40 U/L       Normal       12-78         

 

             Alkaline Phosphatase 79 U/L       Normal               

 

             Bilirubin,Total 0.5 mg/dL    Normal       0.2-1.0       

 

             Total Protein 7.5 GM/DL    Normal       6.4-8.2       

 

             Albumin      3.4 GM/DL    Normal       3.2-5.2       

 

             Albumin/Globulin Ratio 0.8          Low          1.2-2.2       

 

                    Complete Blood Count 2021          [facility]

s, pc

: Dr Dhaval Miranda

[place] [address]

           [phone] WBC        9.2 x10*3/UL            4.1 - 10.9 12

 

             RBC          4.37 x10*6/UL              4.20 - 6.30   

 

             Hemoglobin   12.3 g/dL                 12.0 - 18.0   

 

             Hematocrit   36.6 %       Low          37.0 - 51.0   

 

             MCV          83.6 fL                   80.0 - 97.0   

 

             MCH          28.2 pg                   26.0 - 32.0   

 

             MCHC         33.7 g/dL                 31.0 - 38.0   

 

             RDW          15.5 %       High         11.6 - 13.7   

 

             PLT          506 x10*3/UL High         140 - 440     

 

             MPV          7.1 FL       Low          7.8 - 11.0    

 

             Lymph %      19.4 %                    10.0 - 58.5   

 

             Mid %        5.4 %                     1.7 - 9.3     

 

             Neut %       75.2 %                    37.0 - 92.0   

 

             Lymph #      1.7 x10*3/UL              0.6 - 4.1     

 

             Mid #        0.6 x10*3/UL              0.1 - 0.6     

 

             Neut #       6.9 x10*3/UL              2.0 - 7.8     

 

                    Laboratory test finding 2021          [facility]

ists, 

: Dr Dhaval Miranda

[place] [id number] [phone] Magnesium  1.9 mg/dL             1.8 - 2.4   

 

                    Lipid Profile       2021          [facility]

, 

: Dr Dhaval Miranda

[place] [id number] [phone] Cholesterol 143 mg/dL             131 - 200   

 

             Triglycerides 105 mg/dL                 30 - 150      

 

             HDL Cholesterol 89 mg/dL     High         35 - 60       

 

             LDL (Calculated) 33 CALC      Low          50 - 159      

 

                    Comprehensive Chem Profile 2021          [facility]

[place], 

: Dr Dhaval Miranda

[place] [address] [phone] Glucose    94 mg/dL              74 - 99    13

 

             BUN          19 mg/dL     High         7 - 18        

 

             Creatinine   1.0 mg/dL                 0.6 - 1.3     

 

             Sodium       142 mEq/L                 136 - 145     

 

             Potassium    4.1 mEq/L                 3.5 - 5.1     

 

             Chloride     104 mEq/L                 98 - 107      

 

             Carbon Dioxide 30 mEq/L                  21 - 32       

 

             Calcium      9.0 mg/dL                 8.5 - 10.1    

 

             Alk. Phosphatase 112 mg/dL                 46 - 116      

 

             Total Bilirubin 0.3 mg/dL                 0.2 - 1.0     

 

             Ast (Sgot)   17 U/L                    15 - 37       

 

             Alt (SGPT)   23 U/L                    12 - 78       

 

             Albumin      3.0 g/dL     Low          3.4 - 5.0     

 

             Total Protein 7.4 g/dL                  6.4 - 8.2     

 

             A/G Ratio    0.68 CALC    Low          1.00 - 1.90   

 

             GFR  55 mL/min    Low          >60           

 

             GFR African American >= 60 mL/min              >60          14

 

                    A1c                 2021          [facility]

, pc

: Dr Dhaval Miranda

[place] [address]

           [phone] Hba1c      6.0 %      High       <5.7       15

 

             Est Avg Glucose 125 mg/dL    High         60 - 110      

 

                    Laboratory test finding 2021          [facility]

                                        [address] [place] [address] [phone] Blood Urea Nitrogen 16 mg/dL   Normal     7-18        

 

                    Creatinine With GFR 2021          [facility]

nter

                                        [address] [place] [address] [phone] Creatinine For GFR 0.80 mg/dL Normal     0.55-1.30   

 

             Glomerular Filtration Rate > 60.0       Normal       >45          1

6

 

                    CBC With Differential 2021          [place] [id number] [place] [address] [phone] White Blood Count 10.1 10    High       4.0-10.0    

 

             Red Blood Count 4.32 10      Normal       4.00-5.40     

 

             Hemoglobin   11.7 g/dL    Low          12.0-15.5     

 

             Hematocrit   36.6 %       Normal       36.0-47.0     

 

             Mean Corpuscular Volume 84.7 fl      Normal       80.0-96.0     

 

             Mean Corpuscular Hemoglobin 27.1 pg      Normal       27.0-33.0    

 

 

             Mean Corpuscular HGB Conc 32.0 g/dL    Normal       32.0-36.5     

 

             Red Cell Distribution Width 17.5 %       High         11.5-14.5    

 

 

             Platelet Count, Automated 510 10       High         150-450       

 

             Neutrophils % 70.4 %       High         36.0-66.0     

 

             Lymph %      20.5 %       Low          24.0-44.0     

 

             Mono %       7.0 %        Normal       2.0-8.0       

 

             Eos %        1.1 %        Normal       0.0-3.0       

 

             Baso %       0.6 %        Normal       0.0-1.0       

 

             Immature Granulocyte % 0.4 %        Normal       0-3.0         

 

             Nucleated Red Blood Cell % 0.0 %        Normal       0-0           

 

             Neutrophils # 7.1 10       Normal       1.5-8.5       

 

             Lymph #      2.1 10       Normal       1.5-5.0       

 

             Mono #       0.7 10       Normal       0.0-0.8       

 

             Eos #        0.1 10       Normal       0.0-0.5       

 

             Baso #       0.1 10       Normal       0.0-0.2       

 

                    Laboratory test finding 2021          [address] [address] [phone] Erythrocyte Sedimentation Rate 44 mm/hr   High       0

-30        

 

                    Total Iron Binding Capacit 2021          [address] [address] [phone] Iron (Fe)  37 g/dL  Low              

 

             Total Iron Binding Capacity 383 g/dL   Normal       250-450      

 

 

             Percent Saturation 9.7 %        Low          13.2-45.0     

 

                    Vitamin B12 & Folate 2021          [facility]

enter

                                        [address] [place] [address] [phone] Vitamin B12 Level 764 pg/mL  Normal                17

 

             Folate       20.3 NG/ML   Normal                    18

 

                    Laboratory test finding 2021          [address] [address] [phone] C Reactive Protein Quantitativ 0.66 mg/dL High       0

.00-0.30   

 

                    Quanteferon TB Gold Test 2021          [address] [address] [phone] QuantiFERON Criteria (SEE NOTE)  Normal     .         

 19

 

             QuantiFERON TB1 Ag Value 0.05 IU/mL   Normal       .             

 

             QuantiFERON TB2 Ag Value 0.05 IU/mL   Normal       .             

 

             QuantiFERON Nil Value 0.06 IU/mL   Normal       .             

 

             QuantiFERON Mitogen Value >10.00 IU/mL Normal       .             

 

             QuantiFERON-TB Gold Plus Negative     Normal       Negative     20

 

                    Laboratory test finding 2021          [address] [address] [phone] Hepatitis B Surf AB Quant 4.9 mIU/mL Low        Immuni

ty>9.9 21

 

             Hepatitis B Core Antibody Igg Negative     Normal       Negative   

  22

 

                    [facility] Monitr Crohns's Disease 2021          [address] [place] [address]

           [phone] Monitr Crohn's Disease SEE SEPARATE REP <SEE NOTE>  No

rmal                23

 

                    IBD Reflex Crohns Prognostic 2021          [facility]

                                        [address] [place] [address]

           [phone] Ibdser1    SEE SEPARATE REP <SEE NOTE>  Normal        

        24

 

             Crohn1       SEE SEPARATE REP <SEE NOTE>  Normal                   

 25







                          1                         Units are mL/min/1.73 m2



Chronic Kidney Disease Staging per NKF:



Stage I & II   GFR >=60       Normal to Mildly Decreased

Stage III      GFR 30-59      Moderately Decreased

Stage IV       GFR 15-29      Severely Decreased

Stage V        GFR <15        Very Little GFR Left

ESRD           GFR <15 on RRT



 

                          2                         VITAMIN B12 NORMAL RANGE



NORMAL                     247 - 911 PG/ML

INDETERMINATE              211 - 246 PG/ML

DEFICIENT              LESS THAN 211 PG/ML



 

                          3                         FOLATE NORMAL RANGE



NORMAL             GREATER THAN 5.4 NG/ML

INDETERMINATE               3.4-5.4 NG/ML

DEFICIENT             LESS THAN 3.4 NG/ML



 

                          4                         Normal (<60 Droplets/HPF)



 

                          5                         Normal (<100 Droplets/HPF)



 

                          6                         Test not performed. Consiste

ncy of stool specimen too watery

for analysis.

TEST: [id number] Pancreatic Elastase, Fecal



Testing performed at reference lab . Report copy to follow

on a separate form. 21 REF LAB#:[id number]



 

                          7                         Concentration     Interpreta

tion   Follow-Up

<16 - 50 ug/g     Normal           None

>50 -120 ug/g     Borderline       Re-evaluate in 4-6 weeks

>120 ug/g     Abnormal         Repeat as clinically

indicated



 

                          8                         See Pathology Report

Specimen Collection for Pathology Reference Lab Testing.

Refer to [facility] Pathology Report for Results: [id number]



 

                          9                         See Pathology Report

Specimen Collection for Pathology Reference Lab Testing.

Refer to [facility] Pathology Report for Results:[id number]



 

                          10                        VITAMIN B12 NORMAL RANGE



NORMAL                     247 - 911 PG/ML

INDETERMINATE              211 - 246 PG/ML

DEFICIENT              LESS THAN 211 PG/ML



 

                          11                        Units are mL/min/1.73 m2



Chronic Kidney Disease Staging per NKF:



Stage I & II   GFR >=60       Normal to Mildly Decreased

Stage III      GFR 30-59      Moderately Decreased

Stage IV       GFR 15-29      Severely Decreased

Stage V        GFR <15        Very Little GFR Left

ESRD           GFR <15 on RRT



 

                          12                        NOTE:

CBC VERIFIED







 

                          13                        100-125 mg/dL     PRE-DIABET

ES/FASTING

>126 mg/dL          DIABETES/FASTING



 

                          14                        CHRONIC KIDNEY DISEASE STAGI

NG PER NKF



STAGE I & II      GFR >= 60        NORMAL TO MILDLY DECREASED

STAGE III          GFR 30-59          MODERATELY DECREASED

STAGE IV           GFR 15-29         SEVERELY DECREASED

STAGE V            GFR <15            VERY LITTLE GFR LEFT

ESRD                 GFR <15            ON RRT



 

                          15                        Lab Result Notes:

Pre-Diabetes   5.7 - 6.4 %

Diabetes           = or > 6.5%



 

                          16                        Units are mL/min/1.73 m2



Chronic Kidney Disease Staging per NKF:



Stage I & II   GFR >=60       Normal to Mildly Decreased

Stage III      GFR 30-59      Moderately Decreased

Stage IV       GFR 15-29      Severely Decreased

Stage V        GFR <15        Very Little GFR Left

ESRD           GFR <15 on RRT



 

                          17                        VITAMIN B12 NORMAL RANGE



NORMAL                     247 - 911 PG/ML

INDETERMINATE              211 - 246 PG/ML

DEFICIENT              LESS THAN 211 PG/ML



 

                          18                        FOLATE NORMAL RANGE



NORMAL             GREATER THAN 5.4 NG/ML

INDETERMINATE               3.4-5.4 NG/ML

DEFICIENT             LESS THAN 3.4 NG/ML



 

                          19                        .

The QuantiFERON-TB Gold Plus result is determined by

subtracting the Nil value from either TB antigen (Ag) tube.

The mitogen tube serves as a control for the test.



 

                          20                        The specimen received for Qu

antiFERON testing was incubated

by the ordering institution. Specific procedures outlined

in our Directory of Services and in the package insert for

the QuantiFERON Gold (In Tube) test must be followed to

enable for proper stimulation of cells for the production

of interferon gamma. Chemiluminescence immunoassay

methodology

Performed at:  [facility] [employer]  [id number]

: Araceli B Reyes MD, Phone:  [phone]



 

                          21                        Status of Immunity          

           Anti-HBs Level

                                        ------------------                     -

-------------

Inconsistent with Immunity                   0.0 - 9.9

Consistent with Immunity                          >9.9



 

                          22                        Performed at:  [facility]  [id number]

: Araceli B Reyes MD, Phone:  [phone]



 

                          23                        SEE SEPARATE REPORT



 

                          24                        SEE SEPARATE REPORT



 

                          25                        SEE SEPARATE REPORT

Testing performed at reference lab . Report copy to follow

on a separate form. 21 REF LAB#:[id number]









Procedures





                Date            Code            Description     Status

 

                2021      33585           Office/Outpatient Established Lo

w MDM 20-29 Min Completed

 

                    2021          22578               Chronic Care MGMT 20

 Mins Clinical Staff Time Per Calendar 

Month                                   Completed

 

                2021      19749           Chronic Care Management Services

 Ea Addl 20 Min Completed

 

                2021      80128           Office/Outpatient Established Mo

d MDM 30-39 Min Completed

 

                    2021          25111               Chronic Care MGMT 20

 Mins Clinical Staff Time Per Calendar 

Month                                   Completed

 

                2021      34443           Office/Outpatient Established Lo

w MDM 20-29 Min Completed

 

                2021      [id number]       Diabetic Retinal Eye Exam Comple

[facility]

 

                2020      [id number]       Bone Mineral Density Test Comple

[facility]

 

                2020      [id number]        Mammogram       Completed

 

                2019      [id number]       Diabetic Retinal Eye Exam Comple

[facility]

 

                2018      521219512       Bone Mineral Density Test Comple

[facility]

 

                2018      [id number]        Mammogram       Completed

 

                2016      90163710        Mammogram       Completed

 

                2016      [id number]        Mammogram       Completed

 

                2014      33018564        Mammogram       Completed

 

                2011      [id number]        Mammogram       Completed

 

                2011      [id number]       Bone Mineral Density Test Comple

[facility]

 

                2010      [id number]       Diabetic Foot Exam Completed

 

                2010      [id number]        Mammogram       Completed

 

                10/22/2008      [id number]       Bone Mineral Density Test Comple

[facility]

 

                2006      [id number]        Mammogram       Completed







[employer]





                                        Description

 

                                        No Information Available







Encounters





           Type       Date       Location   Provider   Dx         Diagnosis

 

             Office Visit 2021 10:20a [facility] Olya Hutchins, FNP 

E11.21                                  Type 2 diabetes mellitus with diabetic n

ephropathy

 

                          N18.31                    Chronic kidney disease, stag

e 3a

 

                          K21.9                     Gastro-esophageal reflux dis

ease without esophagitis

 

                          E55.9                     Vitamin D deficiency, unspec

ified

 

                          F41.9                     Anxiety disorder, unspecifie

d

 

                          D50.9                     Iron deficiency anemia, unsp

ecified

 

                          K50.918                   Crohn's disease, unspecified

, with other complication

 

                          Z93.2                     Ileostomy status

 

                          M85.80                    Oth disrd of bone density an

d structure, unspecified site

 

                          E78.1                     Pure hyperglyceridemia

 

             Office Visit 2021  9:00a [facility] Olya Hutchins, FNP 

E11.21                                  Type 2 diabetes mellitus with diabetic n

ephropathy

 

                          N18.31                    Chronic kidney disease, stag

e 3a

 

                          D69.6                     Thrombocytopenia, unspecifie

d

 

                          K21.9                     Gastro-esophageal reflux dis

ease without esophagitis

 

                          E78.00                    Pure hypercholesterolemia, u

nspecified

 

                          E55.9                     Vitamin D deficiency, unspec

ified

 

                          F41.9                     Anxiety disorder, unspecifie

d

 

                          D50.9                     [facility]

[facility]

 

                          K50.918                   Crohn's disease, unspecified

, with other complication

 

                          N95.2                     Postmenopausal atrophic vagi

nitis

 

                          E78.2                     Mixed hyperlipidemia

 

                          Z93.2                     Ileostomy status

 

             Office Visit 2021  2:20p [facility] Olya Daigle

ne, [facility] 

E11.21                                  Type 2 diabetes mellitus with diabetic n

ephropathy

 

                          N18.31                    Chronic kidney disease, stag

e 3a

 

                          Z93.2                     Ileostomy status

 

                          K21.9                     Gastro-esophageal reflux dis

ease without esophagitis

 

                          E78.00                    Pure hypercholesterolemia, u

nspecified

 

                          E55.9                     Vitamin D deficiency, unspec

ified

 

                          F41.9                     Anxiety disorder, unspecifie

d

 

                          D50.9                     [facility]

ec[facility]

 

                          K50.918                   Crohn's disease, unspecified

, with other complication

 

                          H93.13                    Tinnitus, bilateral

 

                          H61.23                    Impacted cerumen, bilateral







Assessments





                Date            Code            Description     Provider

 

                2021      E11.21          Type 2 diabetes mellitus with di

abetic nephropathy Olya Bates, [facility]

 

                2021      N18.31          Chronic kidney disease, stage 3a

 Olya Bates [facility]

 

                2021      K21.9           Gastro-esophageal reflux disease

 without esophagitis Olya Bates [facility]

 

                2021      E55.9           Vitamin D deficiency, unspecifie

d Olya Bates [facility]

 

                2021      F41.9           Anxiety disorder, unspecified An

n Krystal Marley, [facility]

 

                2021      D50.9           Iron deficiency anemia, unspecif

ied Olya Bates [facility]

 

                2021      K50.918         Crohn's disease, unspecified, wi

th other complication Olya Bates [facility]

 

                2021      Z93.2           Ileostomy status Olya Bates [facility]

 

                2021      M85.80          Other specified disorders of bon

e density and structure, uns 

Olya Bates, [facility]

 

                2021      E78.1           Pure hyperglyceridemia Olya Krystal Rajesh

ne, [facility]

 

                2021      N18.31          Chronic kidney disease, stage 3a

 Dhaval Miranda MD

 

                2021      K21.9           Gastro-esophageal reflux disease

 without esophagitis Dhaval Miranda MD

 

                2021      E78.00          Pure hypercholesterolemia, unspe

cified Dhaval Miranda MD

 

                2021      E11.21          Type 2 diabetes mellitus with di

abetic nephropathy Olya Bates, [facility]

 

                2021      N18.31          Chronic kidney disease, stage 3a

 Olya Krystal Marley, [facility]

 

                2021      D69.6           Thrombocytopenia, unspecified An

n Krystal Marley, [facility]

 

                2021      K21.9           Gastro-esophageal reflux disease

 without esophagitis Olya Bates, [facility]

 

                2021      E78.00          Pure hypercholesterolemia, unspe

cified Olya Bates, [facility]

 

                2021      E55.9           Vitamin D deficiency, unspecifie

d Olya Krystal Marley, [facility]

 

                2021      F41.9           Anxiety disorder, unspecified An

n Krystal Marley, [facility]

 

                2021      D50.9           Iron deficiency anemia, unspecif

ied Olya Bates, [facility]

 

                2021      K50.918         Crohn's disease, unspecified, wi

th other complication Olya Bates, [facility]

 

                2021      N95.2           Postmenopausal atrophic vaginiti

s Olya Krystal Marley, [facility]

 

                2021      E78.2           Mixed hyperlipidemia Olya Krystal Marley

, [facility]

 

                2021      Z93.2           Ileostomy status Olya Bates, [facility]

 

                2021      E11.21          Type 2 diabetes mellitus with di

abetic nephropathy Dhaval Miranda MD

 

                2021      N18.31          Chronic kidney disease, stage 3a

 Dhaval Miranda MD

 

                2021      K21.9           Gastro-esophageal reflux disease

 without esophagitis Dhaval Miranda MD

 

                2021      E78.00          Pure hypercholesterolemia, unspe

cified Dhaval Miranda MD

 

                2021      E11.21          Type 2 diabetes mellitus with di

abetic nephropathy Olya Bates, [facility]

 

                2021      N18.31          Chronic kidney disease, stage 3a

 Olya Bates, [facility]

 

                2021      Z93.2           Ileostomy status Olya Bates, [facility]

 

                2021      K21.9           Gastro-esophageal reflux disease

 without esophagitis Olya Bates, [facility]

 

                2021      E78.00          Pure hypercholesterolemia, unspe

cified Olya Bates, [facility]

 

                2021      E55.9           Vitamin D deficiency, unspecifie

d Olya Bates, [facility]

 

                2021      F41.9           Anxiety disorder, unspecified An

n Krystal Donell, [facility]

 

                2021      D50.9           Iron deficiency anemia, unspecif

ied Olya Bates, [facility]

 

                2021      K50.918         Crohn's disease, unspecified, wi

th other complication Olya Bates, [facility]

 

                2021      H93.13          Tinnitus, bilateral Olya Bates,

 [facility]

 

                2021      H61.23          Impacted cerumen, bilateral NIMA Douglas







Plan of Treatment

Future Appointment(s):* 2021  8:00 am - NIMA Douglas at [facility]

* 2022  9:00 am - Nurse #2 at [facility]

* 2022  9:20 am - NIMA Douglas at [facility]

2021 - NIMA Douglas* E11.21 Type 2 diabetes mellitus with diabetic 
  nephropathy* Comments:* Last A1C at goal.





* N18.31 Chronic kidney disease, stage 3a* Comments:* Last BMP acceptable.





* K21.9 Gastro-esophageal reflux disease without esophagitis* Comments:* 
  asymptomatic on PPI





* E55.9 Vitamin D deficiency, unspecified* Comments:* continue supplementation.





* F41.9 Anxiety disorder, unspecified* Comments:* Had a recent bout with 
  increased anxiety due to the situation of having Her dog put to sleep and the 
  move.  However, is doing well now on the Bupropion.





* D50.9 Iron deficiency anemia, unspecified* Comments:* She has been referred to
  hematology due to her chronic elevated platelets.  It looks like from their 
  review they feel it is related to her inflammatory bowel disease.  Her most 
  recent iron saturation level remains quite low.  She does remain on oral iron.
   She may be someone who needs to have IV infusions.





* K50.918 Crohn's disease, unspecified, with other complication* Comments:* 
  Follows with Dr. Farmer.  Has had her initial IV infusion of Stelara but is
  having a difficult time getting her home dose.





* Z93.2 Ileostomy status

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
  * Adequate calcium (1000mg) and  vitamin D 1000 units daily discussed.





* E78.1 Pure hyperglyceridemia* Comments:* Tolerating statin therapy without 
  adverse effects.









Functional Status





                                        Description

 

                                        No Information Available







Mental Status





                                        Description

 

                                        No Information Available







Referrals





                Refer to      Reason for Referral Status          Appt Date

 

                [facility] Hematology/Oncology NP CONSULT FOR ELEVATED PLATELETS  T[place]han[place]

nt Notified 

2021

 

                                        [address] [place]  [address]

 

                                        [phone]

## 2021-10-28 NOTE — ROOR
________________________________________________________________________________

Patient Name: Vanesa Kinsey               Procedure Date: 10/28/2021 8:55 AM

MRN: [id number]                          Account Number: [id number]

YOB: 1955               Age: 66

Room: [facility]                            Gender: Female

Note Status: Finalized                 

________________________________________________________________________________

 

Procedure:            Colonoscopy

Indications:          Abnormal small bowel series, Disease activity assessment 

                      of Crohn's disease of the small bowel, Assess 

                      therapeutic response to therapy of Crohn's disease of 

                      the small bowel

Providers:            Israel Farmer MD

Referring MD:         Olya Ayoub NP

Requesting Provider:  

Medicines:            Monitored Anesthesia Care

Complications:        No immediate complications.

________________________________________________________________________________

Procedure:            Pre-Anesthesia Assessment:

                      - Prior to the procedure, a History and Physical was 

                      performed, and patient medications and allergies were 

                      reviewed. The patient is competent. The risks and 

                      benefits of the procedure and the sedation options and 

                      risks were discussed with the patient. All questions 

                      were answered and informed consent was obtained. Patient 

                      identification and proposed procedure were verified by 

                      the physician, the nurse and the anesthesiologist in the 

                      procedure room. Mental Status Examination: alert and 

                      oriented. Airway Examination: normal oropharyngeal 

                      airway and neck mobility. Respiratory Examination: clear 

                      to auscultation. CV Examination: normal. Prophylactic 

                      Antibiotics: The patient does not require prophylactic 

                      antibiotics. Prior Anticoagulants: The patient has taken 

                      no previous anticoagulant or antiplatelet agents. ASA 

                      Grade Assessment: III - A patient with severe systemic 

                      disease. After reviewing the risks and benefits, the 

                      patient was deemed in satisfactory condition to undergo 

                      the procedure. The anesthesia plan was to use monitored 

                      anesthesia care (MAC). Immediately prior to 

                      administration of medications, the patient was 

                      re-assessed for adequacy to receive sedatives. The heart 

                      rate, respiratory rate, oxygen saturations, blood 

                      pressure, adequacy of pulmonary ventilation, and 

                      response to care were monitored throughout the 

                      procedure. The physical status of the patient was 

                      re-assessed after the procedure.

                      The Colonoscope was introduced through the ileostomy and 

                      advanced to 20 cm into the ileum. The colonoscopy was 

                      performed without difficulty. The patient tolerated the 

                      procedure well. The quality of the bowel preparation was 

                      good. The terminal ileum was photographed. Scope 

                      insertion time was 3 minutes. Scope withdrawal time was 

                      6 minutes. The total duration of the procedure was 9 

                      minutes.

                                                                                

Findings:

     Patchy inflammation, moderate in severity and graded as Rutgeerts Score 

     i2 (more than five aphthous lesions with normal intervening mucosa or 

     skip areas of larger lesions or lesions confined to the ileocolonic 

     anastomosis) and characterized by erythema, granularity, serpentine 

     ulcerations and shallow ulcerations was found at 15 cm Proximal to the 

     Stoma. Biopsies were taken with a cold forceps for histology. 

     Verification of patient identification for the specimen was done by the 

     physician and nurse using the patient's name, birth date and medical 

     record number. Estimated blood loss was minimal.

     The area at 24 cm proximal to the stoma contained a benign-appearing, 

     intrinsic severe stenosis measuring 8 cm (in length) x 8 mm (inner 

     diameter) that was non-traversed. Biopsies were taken with a cold forceps 

     for histology.

     There was evidence of a patent end ileostomy found in the distal ileum. 

     This was characterized by healthy appearing mucosa.

                                                                                

Impression:           - Crohn's disease with ileitis. Biopsied.

                      - Stricture in the area at 24 cm proximal to the stoma. 

                      Biopsied.

                      - Patent end ileostomy with healthy appearing mucosa in 

                      the distal ileum.

Recommendation:       - Patient has a contact number available for 

                      emergencies. The signs and symptoms of potential delayed 

                      complications were discussed with the patient. Return to 

                      normal activities tomorrow. Written discharge 

                      instructions were provided to the patient.

                      - Mechanical soft diet.

                      - Continue present medications.

                      - Use Entocort EC (budesonide) (Tapering course) 9mg 

                      dose daily for 4 weeks, then 6mg dose for 4 weeks and 

                      then 3 mg dose for last 4 weeks. Take tablets together 

                      PO one time per day.

                      - Await pathology results.

                      - Check with Clinic for the interval labs to be done for 

                      monitoring in 6 weeks.

                      - Return to GI clinic in [facility] 

                      (address: [id number] [place] [address], [address] [place] [address], [place], [place],[address]) in 4 -- 6 weeks. Please call GI clinic 

                      @ [phone] for apppointment date and time.

                      - Return to primary care physician.

                                                                                

Procedure Code(s):    --- Professional ---

                      87821, Ileoscopy, through stoma; with biopsy, single or 

                      multiple

Diagnosis Code(s):    --- Professional ---

                      K50.012, Crohn's disease of small intestine with 

                      intestinal obstruction

                      Z93.2, Ileostomy status

                      R93.3, Abnormal findings on diagnostic imaging of other 

                      parts of digestive tract

 

CPT copyright 2019 [employer]. All rights reserved.

 

The codes documented in this report are preliminary and upon  review may 

be revised to meet current compliance requirements.

 

Israel Farmer MD

_______________________

Israel Farmer MD

10/28/2021 10:08:59 AM

Electronically signed by Israel Farmer MD

Number of Addenda: 0

 

Note Initiated On: 10/28/2021 8:55 AM

Estimated Blood Loss: Estimated blood loss was minimal.

## 2021-10-28 NOTE — CCD
Continuity of Care Document (CCD)

                             Created on: 2021



Vanesa Kinsey

External Reference #: [id number]

: 1955

Sex: Female



Demographics





                          Address                   [address] [place]  [address]

 

                          Home Phone                [phone]

 

                          Preferred Language        Unknown

 

                          Marital Status            Unknown

 

                          Religion Affiliation     Unknown

 

                          Race                      White

 

                          Ethnic Group              Not  or 





Author





                          Author                    Vanesa Douglas

 

                          Organization              Unknown

 

                          Address                   [address]  [address]



 

                          Phone                     [phone]







Care Team Providers





                    Care Team Member Name Role                Phone

 

                    Jessica Cervantes MD    AUTM                [phone]

 

                    Olya Bangura ANP   AUTM                [phone]

 

                    Israel Farmer MD AUTM                [phone]







Problems





                    Active Problems     Provider            Date

 

                    Pulmonary embolism  NIMA Douglas    Onset: 04/10/2012

 

                    Gastroesophageal reflux disease LUCRETIA Ring Onset

: 04/10/2012

 

                    Pure hypercholesterolemia LUCRETIA Ring Onset: 

 

                    Type 2 diabetes mellitus LUCRETIA Ring Onset: 04/10

/2012

 

                    Anemia              Marc Rodriguez D.O. Onset: 04/10/

2012







Social History





                Type            Date            Description     Comments

 

                Birth Sex                       Unknown          

 

                ETOH Use                        Occasionally consumes beer  

 

                Tobacco Use     Start: Unknown End: Unknown Patient is a former 

smoker SMOKED FOR 

15YRS 1 MAGGY A DAY quit age 37 







Allergies, Adverse Reactions, Alerts





             Active Allergies Reaction     Severity     Comments     Date

 

             Tetracycline                           rash         2010







Medications





           Active Medications SIG        Qnty       Indications Ordering Provide

r Date

 

                          Alprazolam                     0.25mg Tablets         

          1/2 - one tablet

twice daily as needed anxiety 10tabs                          NIMA Douglas 0

2021

 

                          Zolpidem Tartrate                     5mg Tablets     

              take one 

tablet by mouth at bedtime as needed for sleep maximum daily dose = 1 tablet mdd
1               7tabs                           NIMA Douglas 06/10/2021

 

                          Estrace                     0.1mg/GM Cream            

       1/4 applicatorful 

vaginally at bedtime daily x 14 days then three times a week 42.500gm           

                     NIMA Douglas                               2021

 

                    Lutein                     20mg Capsules                   1

 by mouth every day 

                                        Olya Bates [facility]    2021

 

                          Lisinopril                     2.5mg Tablets          

         1 by mouth every 

day             90tabs          E11.21          [facility]

 

           Calcium + D 1200MG/D                         Josephine Escalante M.D. 

2019

 

                                        Bupropion Hydrochloride ER (XL)         

            150mg Tablets ER 24HR       

             take 1 tablet by mouth once daily 90tabs                    Olya Bates [facility] 

10/15/2019

 

                                        Fluticasone Propionate                  

   50mcg/Act Suspension                 

             2 sprays each nostril daily X 2 Weeks Then prn 16gm                

      [facility] 

[facility]

 

                                        Fortify Probiotic Womens Extra Strength 

                     Capsules Cait Walls,[facility] 20

18

 

                          Buspirone HCL                     15mg Tablets        

           Take 1 Tablet 

By Mouth Twice Daily 180tabs                         Olya Bates [facility] 2018

 

                          Vitamin D                     1000Unit Tablets        

           2 by mouth 

every day                                       [facility] 

8

 

                          Onetouch Ultra Blue                      Strips       

            use twice 

daily to check blood sugar dx e11.9 200units                        [facility] 10/11/2017

 

                          Womens One Daily                      Tablets         

          1 by mouth every

day                                             [facility] 

6

 

                          Metformin HCL ER                     500mg Tablets ER 

24HR                   

take 2 tablets by mouth once daily with the largest meal of the day 180tabs     

                            

Olya Bates [facility]                        2016

 

                          Aspirin Childrens                     81mg Chewtabs   

                1 by mouth

every hs                                        [facility] 2016

 

                          Loperamide HCL                     2mg Capsules       

            4 capsules at 

bedtime by mouth may take additional 4 capsules in daytime - not to exceed 16mg 
daily           240caps                         Olya Bates [facility] 2016

 

                          Ferrous Sulfate                     325(65Fe) mg Table

ts DR                   1 

by mouth every day                                 [facility] 

6

 

             Melatonin                     10mg Tablets                   1 hs p

o                                [facility]                             2016

 

                          Atorvastatin Calcium                     20mg Tablets 

                  Take 1 

Tablet By Mouth Every Day 90tabs                          Olya Bates [facility] 

 

                          Omeprazole                     20mg Capsules DR       

            take 1 capsule

by mouth twice daily. maximum daily dose is 2 180caps                         An

n Krystal Marley, NIMA 

2010

 

                          Claritin                     10mg Tablets             

      1 by mouth every day

                                                Unknown         

 

             Similasan Earache Relief                      Solution             

                                             

Unknown                                 

 

           Omega-3 Drops For Eyes                                  Unknown    

 

                          Budesonide                     3mg Caps DR Part       

            3 tabs daily 

before dinner for four weeks, then 2 tabs daily for four weeks, then 1 tab daily
for remaining four weeks- Dr. Farmer's                                 Unknow

n         

 

                          Magnesium                     300mg Capsules          

         1 by mouth every 

day                                             Unknown         

 

                Stelara                     90mg/ml Soln Prefill Syringe        

                                            

                          Unknown                   







Medications Administered in Office





           Medication SIG        Qnty       Indications Ordering Provider Date

 

                          Covid-19 vaccine, Unspecified                      Inj

ection                    

                                                Unknown         2021

 

                          Covid-19 vaccine, Unspecified                      Inj

ection                    

                                                Unknown         2021

 

                                        Immunization Adminstration,1 Vaccine/Tox

oid                      Injection      

                                                    Cait Henson FNP 20

20







Immunizations





           CPT Code   Status     Date       Vaccine    Reaction   Lot #

 

           [id number]      Given      2020 Pneumovax 23            [id number]

 

           36066      Given      2020 Adacel- Tetanus Diphtheria Pertussis

 (Age65 & Under)            

[id number]

 

           U-Flu      Given      2019 Influenza,Unspecified             

 

           U-PneuC    Given      07/15/2019 Prevnar 13             

 

                53075           Given           07/15/2019      Shingrix Zoster 

Vaccine (HZV), Recombinant, Subunit, 

Adjuvanted                                           

 

           72540      Given      05/15/2019 Shingrix               

 

           U-Flu      Given      10/11/2018 Influenza,Unspecified             

 

           82372      Given      2017 PPD        0 mm read by Lashaun ortega, SIRENA [id number]

 

                 Given      2016 Fluvirin Virus Vaccine            16

[id number]

 

           01821      Given      10/10/2003 Pneumovax 23             







Vital Signs





                Date            Vital           Result          Comment

 

                2021 10:32am BP Systolic     130 mmHg         

 

                    BP Diastolic        64 mmHg              

 

                    Heart Rate          88 /min              

 

                    Height              61.25 inches        5'1.25"

 

                    Weight              125.00 lb            

 

                    O2 % BldC Oximetry  96 %                 

 

                    BMI (Body Mass Index) 23.4 kg/m2           

 

                2021  8:40am BP Systolic     128 mmHg         

 

                    BP Diastolic        68 mmHg              

 

                    Heart Rate          74 /min              

 

                    Height              61.25 inches        5'1.25"

 

                    Weight              118.50 lb            

 

                    O2 % BldC Oximetry  98 %                RM Air

 

                    BMI (Body Mass Index) 22.2 kg/m2           







Results





        Test    Acquired Date Facility Test    Result  H/L     Range   Note

 

                    Laboratory test finding 2021          [facility]

                                        [address] [place] [address] [phone] Erythrocyte Sedimentation Rate 39 mm/hr   High       0

-30        

 

                    Liver Profile       2021          [facility]

nter

                                        [address] [place] [phone] Ast/Sgot   20 U/L     Normal     7-37        

 

             Alt/SGPT     30 U/L       Normal       12-78         

 

             Alkaline Phosphatase 95 U/L       Normal               

 

             Bilirubin,Total 0.4 mg/dL    Normal       0.2-1.0       

 

             Bilirubin,Direct 0.1 mg/dL    Normal       0.0-0.2       

 

             Total Protein 6.9 GM/DL    Normal       6.4-8.2       

 

             Albumin      3.3 GM/DL    Normal       3.2-5.2       

 

             Albumin/Globulin Ratio 0.9          Low          1.2-2.2       

 

                    Basic Metabolic Profile 2021          [address] [address] [phone] Glucose, Fasting 126 mg/dL  High             

 

             Blood Urea Nitrogen 13 mg/dL     Normal       7-18          

 

             Creatinine For GFR 0.84 mg/dL   Normal       0.55-1.30     

 

             Glomerular Filtration Rate > 60.0       Normal       >45          1

 

             Sodium Level 140 mEq/L    Normal       136-145       

 

             Potassium Serum 4.4 mEq/L    Normal       3.5-5.1       

 

             Chloride Level 106 mEq/L    Normal               

 

             Carbon Dioxide Level 28 mEq/L     Normal       21-32         

 

             Anion Gap    6 mEq/L      Low          8-16          

 

             Calcium Level 9.6 mg/dL    Normal       8.8-10.2      

 

                    Total Iron Binding Capacit 2021          [address] [address] [phone] Iron (Fe)  33 g/dL  Low              

 

             Total Iron Binding Capacity 424 g/dL   Normal       250-450      

 

 

             Percent Saturation 7.8 %        Low          13.2-45.0     

 

                    Vitamin B12 & Folate 2021          [address] [place] [address] [phone] Vitamin B12 Level 598 pg/mL  Normal                2

 

             Folate       > 24.0 NG/ML Normal                    3

 

                    Laboratory test finding 2021          [facility]

                                        [address] [place] [address] [phone] C Reactive Protein Quantitativ 0.64 mg/dL High       0

.00-0.30   

 

                    CBC With Differential 2021          [facility]

                                        [address] [place] [address] [phone] White Blood Count 10.4 10    High       4.0-10.0    

 

             Red Blood Count 4.13 10      Normal       4.00-5.40     

 

             Hemoglobin   11.9 g/dL    Low          12.0-15.5     

 

             Hematocrit   36.9 %       Normal       36.0-47.0     

 

             Mean Corpuscular Volume 89.3 fl      Normal       80.0-96.0     

 

             Mean Corpuscular Hemoglobin 28.8 pg      Normal       27.0-33.0    

 

 

             Mean Corpuscular HGB Conc 32.2 g/dL    Normal       32.0-36.5     

 

             Red Cell Distribution Width 19.7 %       High         11.5-14.5    

 

 

             Platelet Count, Automated 454 10       High         150-450       

 

             Neutrophils % 68.2 %       High         36.0-66.0     

 

             Lymph %      21.9 %       Low          24.0-44.0     

 

             Mono %       8.1 %        High         2.0-8.0       

 

             Eos %        0.7 %        Normal       0.0-3.0       

 

             Baso %       0.6 %        Normal       0.0-1.0       

 

             Immature Granulocyte % 0.5 %        Normal       0-3.0         

 

             Nucleated Red Blood Cell % 0.0 %        Normal       0-0           

 

             Neutrophils # 7.1 10       Normal       1.5-8.5       

 

             Lymph #      2.3 10       Normal       1.5-5.0       

 

             Mono #       0.9 10       High         0.0-0.8       

 

             Eos #        0.1 10       Normal       0.0-0.5       

 

             Baso #       0.1 10       Normal       0.0-0.2       

 

                    CBC With Differential 2021          [facility]

                                        [address] [place] [address] [phone] White Blood Count 15.0 10    High       4.0-10.0    

 

             Red Blood Count 4.63 10      Normal       4.00-5.40     

 

             Hemoglobin   12.9 g/dL    Normal       12.0-15.5     

 

             Hematocrit   39.2 %       Normal       36.0-47.0     

 

             Mean Corpuscular Volume 84.7 fl      Normal       80.0-96.0     

 

             Mean Corpuscular Hemoglobin 27.9 pg      Normal       27.0-33.0    

 

 

             Mean Corpuscular HGB Conc 32.9 g/dL    Normal       32.0-36.5     

 

             Red Cell Distribution Width 19.1 %       High         11.5-14.5    

 

 

             Platelet Count, Automated 319 10       Normal       150-450       

 

             Neutrophils % 92.5 %       High         36.0-66.0     

 

             Lymph %      5.4 %        Low          24.0-44.0     

 

             Mono %       0.7 %        Low          2.0-8.0       

 

             Eos %        0.0 %        Normal       0.0-3.0       

 

             Baso %       0.1 %        Normal       0.0-1.0       

 

             Immature Granulocyte % 1.3 %        Normal       0-3.0         

 

             Nucleated Red Blood Cell % 0.0 %        Normal       0-0           

 

             Neutrophils # 13.9 10      High         1.5-8.5       

 

             Lymph #      0.8 10       Low          1.5-5.0       

 

             Mono #       0.1 10       Normal       0.0-0.8       

 

             Eos #        0.0 10       Normal       0.0-0.5       

 

             Baso #       0.0 10       Normal       0.0-0.2       

 

                    Comprehensive Metabolic Profil 2021          [place] [id number] [phone] Glucose, Fasting 121 mg/dL  High             

 

             Blood Urea Nitrogen 27 mg/dL     High         7-18          

 

             Creatinine For GFR 1.00 mg/dL   Normal       0.55-1.30     

 

             Glomerular Filtration Rate 58.9         Normal       >45          4

 

             Sodium Level 137 mEq/L    Normal       136-145       

 

             Potassium Serum 4.2 mEq/L    Normal       3.5-5.1       

 

             Chloride Level 106 mEq/L    Normal               

 

             Carbon Dioxide Level 25 mEq/L     Normal       21-32         

 

             Anion Gap    6 mEq/L      Low          8-16          

 

             Calcium Level 8.9 mg/dL    Normal       8.8-10.2      

 

             Ast/Sgot     26 U/L       Normal       7-37          

 

             Alt/SGPT     40 U/L       Normal       12-78         

 

             Alkaline Phosphatase 79 U/L       Normal               

 

             Bilirubin,Total 0.5 mg/dL    Normal       0.2-1.0       

 

             Total Protein 7.5 GM/DL    Normal       6.4-8.2       

 

             Albumin      3.4 GM/DL    Normal       3.2-5.2       

 

             Albumin/Globulin Ratio 0.8          Low          1.2-2.2       

 

                    Total Iron Binding Capacit 2021          [facility]

                                        [address] [place] [address] [phone] Iron (Fe)  139 g/dL Normal           

 

             Total Iron Binding Capacity 494 g/dL   High         250-450      

 

 

             Percent Saturation 28.1 %       Normal       13.2-45.0     

 

                    Laboratory test finding 2021          [facility]

                                        [address], [place] [id number] [phone] Vitamin B12 Level 580 pg/mL  Normal     247-911    5

 

             Ferritin     19 NG/ML     Normal       8-252         

 

                    BCR/Abl Screen For CML Path 2021          [address] [id number] [phone]  BCR/Abl Screen For CML Path So See Pathology Re 

<SEE NOTE>  

Normal                                              6

 

                    Laboratory test finding 2021          [place] [address] [place] [id number] [phone]  Jak2 Mutations For Path Sendou See Pathology Re 

<SEE NOTE>  

Normal                                              7

 

                    A1c                 2021          [facility]

, pc

: Dr Dhaval Miranda

[place], [place] [id number] [phone] Hba1c      6.0 %      High       <5.7       8

 

             Est Avg Glucose 125 mg/dL    High         60 - 110      

 

                    Laboratory test finding 2021          [facility]

[facility], pc

: Dr Dhaval Miranda

[place], [place] [id number] [phone] Magnesium  1.9 mg/dL             1.8 - 2.4   

 

                    Lipid Profile       2021          [facility]

, pc

: Dr Dhaval Miranda

[place], [place] [id number] [phone] Cholesterol 143 mg/dL             131 - 200   

 

             Triglycerides 105 mg/dL                 30 - 150      

 

             HDL Cholesterol 89 mg/dL     High         35 - 60       

 

             LDL (Calculated) 33 CALC      Low          50 - 159      

 

                    Comprehensive Chem Profile 2021          [facility]

[facility]

: [place] [address] [phone] Glucose    94 mg/dL              74 - 99    9

 

             BUN          19 mg/dL     High         7 - 18        

 

             Creatinine   1.0 mg/dL                 0.6 - 1.3     

 

             Sodium       142 mEq/L                 136 - 145     

 

             Potassium    4.1 mEq/L                 3.5 - 5.1     

 

             Chloride     104 mEq/L                 98 - 107      

 

             Carbon Dioxide 30 mEq/L                  21 - 32       

 

             Calcium      9.0 mg/dL                 8.5 - 10.1    

 

             Alk. Phosphatase 112 mg/dL                 46 - 116      

 

             Total Bilirubin 0.3 mg/dL                 0.2 - 1.0     

 

             Ast (Sgot)   17 U/L                    15 - 37       

 

             Alt (SGPT)   23 U/L                    12 - 78       

 

             Albumin      3.0 g/dL     Low          3.4 - 5.0     

 

             Total Protein 7.4 g/dL                  6.4 - 8.2     

 

             A/G Ratio    0.68 CALC    Low          1.00 - 1.90   

 

             GFR  55 mL/min    Low          >60           

 

             GFR African American >= 60 mL/min              >60          10

 

                    Complete Blood Count 2021          [facility]

s, pc

: Dr Puentes [place], [place] [id number] [phone] WBC        9.2 x10*3/UL            4.1 - 10.9 11

 

             RBC          4.37 x10*6/UL              4.20 - 6.30   

 

             Hemoglobin   12.3 g/dL                 12.0 - 18.0   

 

             Hematocrit   36.6 %       Low          37.0 - 51.0   

 

             MCV          83.6 fL                   80.0 - 97.0   

 

             MCH          28.2 pg                   26.0 - 32.0   

 

             MCHC         33.7 g/dL                 31.0 - 38.0   

 

             RDW          15.5 %       High         11.6 - 13.7   

 

             PLT          506 x10*3/UL High         140 - 440     

 

             MPV          7.1 FL       Low          7.8 - 11.0    

 

             Lymph %      19.4 %                    10.0 - 58.5   

 

             Mid %        5.4 %                     1.7 - 9.3     

 

             Neut %       75.2 %                    37.0 - 92.0   

 

             Lymph #      1.7 x10*3/UL              0.6 - 4.1     

 

             Mid #        0.6 x10*3/UL              0.1 - 0.6     

 

             Neut #       6.9 x10*3/UL              2.0 - 7.8     

 

                    Laboratory test finding 2021          [facility]

                                        [address] [place] [address] [phone] Blood Urea Nitrogen 16 mg/dL   Normal     7-18        

 

                    Creatinine With GFR 2021          [facility]

nter

                                        [address] [place] [address] [phone] Creatinine For GFR 0.80 mg/dL Normal     0.55-1.30   

 

             Glomerular Filtration Rate > 60.0       Normal       >45          1

2

 

                    CBC With Differential 2021          [facility]

                                        [address] [place] [address] [phone] White Blood Count 10.1 10    High       4.0-10.0    

 

             Red Blood Count 4.32 10      Normal       4.00-5.40     

 

             Hemoglobin   11.7 g/dL    Low          12.0-15.5     

 

             Hematocrit   36.6 %       Normal       36.0-47.0     

 

             Mean Corpuscular Volume 84.7 fl      Normal       80.0-96.0     

 

             Mean Corpuscular Hemoglobin 27.1 pg      Normal       27.0-33.0    

 

 

             Mean Corpuscular HGB Conc 32.0 g/dL    Normal       32.0-36.5     

 

             Red Cell Distribution Width 17.5 %       High         11.5-14.5    

 

 

             Platelet Count, Automated 510 10       High         150-450       

 

             Neutrophils % 70.4 %       High         36.0-66.0     

 

             Lymph %      20.5 %       Low          24.0-44.0     

 

             Mono %       7.0 %        Normal       2.0-8.0       

 

             Eos %        1.1 %        Normal       0.0-3.0       

 

             Baso %       0.6 %        Normal       0.0-1.0       

 

             Immature Granulocyte % 0.4 %        Normal       0-3.0         

 

             Nucleated Red Blood Cell % 0.0 %        Normal       0-0           

 

             Neutrophils # 7.1 10       Normal       1.5-8.5       

 

             Lymph #      2.1 10       Normal       1.5-5.0       

 

             Mono #       0.7 10       Normal       0.0-0.8       

 

             Eos #        0.1 10       Normal       0.0-0.5       

 

             Baso #       0.1 10       Normal       0.0-0.2       

 

                    Laboratory test finding 2021          [facility]

                                        [address] [place] [address] [phone] Erythrocyte Sedimentation Rate 44 mm/hr   High       0

-30        

 

                    Total Iron Binding Capacit 2021          [facility]

                                        [address] [place] [address] [phone] Iron (Fe)  37 g/dL  Low              

 

             Total Iron Binding Capacity 383 g/dL   Normal       250-450      

 

 

             Percent Saturation 9.7 %        Low          13.2-45.0     

 

                    Vitamin B12 & Folate 2021          [facility]

enter

                                        [address] [place] [address] [phone] Vitamin B12 Level 764 pg/mL  Normal                13

 

             Folate       20.3 NG/ML   Normal                    14

 

                    Laboratory test finding 2021          [facility]

                                        [address] [place] [address] [phone] C Reactive Protein Quantitativ 0.66 mg/dL High       0

.00-0.30   

 

                    Quanteferon TB Gold Test 2021          [address] [address]

           [phone] QuantiFERON Criteria (SEE NOTE)  Normal     .         

 15

 

             QuantiFERON TB1 Ag Value 0.05 IU/mL   Normal       .             

 

             QuantiFERON TB2 Ag Value 0.05 IU/mL   Normal       .             

 

             QuantiFERON Nil Value 0.06 IU/mL   Normal       .             

 

             QuantiFERON Mitogen Value >10.00 IU/mL Normal       .             

 

             QuantiFERON-TB Gold Plus Negative     Normal       Negative     16

 

                    Laboratory test finding 2021          [address] [address] [phone] Hepatitis B Surf AB Quant 4.9 mIU/mL Low        Immuni

ty>9.9 17

 

             Hepatitis B Core Antibody Igg Negative     Normal       Negative   

  18

 

                    Prometh Monitr Crohns's Disease 2021          [facility]

                                        [address] [place] [address] [phone] Monitr Crohn's Disease SEE SEPARATE REP <SEE NOTE>  No

rmal                19

 

                    IBD Reflex Crohns Prognostic 2021          [address] [id number] [phone] Ibdser1    SEE SEPARATE REP <SEE NOTE>  Normal        

        20

 

             Crohn1       SEE SEPARATE REP <SEE NOTE>  Normal                   

 21







                          1                         Units are mL/min/1.73 m2



Chronic Kidney Disease Staging per NKF:



Stage I & II   GFR >=60       Normal to Mildly Decreased

Stage III      GFR 30-59      Moderately Decreased

Stage IV       GFR 15-29      Severely Decreased

Stage V        GFR <15        Very Little GFR Left

ESRD           GFR <15 on RRT



 

                          2                         VITAMIN B12 NORMAL RANGE



NORMAL                     247 - 911 PG/ML

INDETERMINATE              211 - 246 PG/ML

DEFICIENT              LESS THAN 211 PG/ML



 

                          3                         FOLATE NORMAL RANGE



NORMAL             GREATER THAN 5.4 NG/ML

INDETERMINATE               3.4-5.4 NG/ML

DEFICIENT             LESS THAN 3.4 NG/ML



 

                          4                         Units are mL/min/1.73 m2



Chronic Kidney Disease Staging per NKF:



Stage I & II   GFR >=60       Normal to Mildly Decreased

Stage III      GFR 30-59      Moderately Decreased

Stage IV       GFR 15-29      Severely Decreased

Stage V        GFR <15        Very Little GFR Left

ESRD           GFR <15 on RRT



 

                          5                         VITAMIN B12 NORMAL RANGE



NORMAL                     247 - 911 PG/ML

INDETERMINATE              211 - 246 PG/ML

DEFICIENT              LESS THAN 211 PG/ML



 

                          6                         See Pathology Report

Specimen Collection for Pathology Reference Lab Testing.

Refer to [facility] Pathology Report for Results:[id number]



 

                          7                         See Pathology Report

Specimen Collection for Pathology Reference Lab Testing.

Refer to [facility] Pathology Report for Results: [id number]



 

                          8                         Lab Result Notes:

Pre-Diabetes   5.7 - 6.4 %

Diabetes           = or > 6.5%



 

                          9                         100-125 mg/dL     PRE-DIABET

ES/FASTING

>126 mg/dL          DIABETES/FASTING



 

                          10                        CHRONIC KIDNEY DISEASE STAGI

NG PER NKF



STAGE I & II      GFR >= 60        NORMAL TO MILDLY DECREASED

STAGE III          GFR 30-59          MODERATELY DECREASED

STAGE IV           GFR 15-29         SEVERELY DECREASED

STAGE V            GFR <15            VERY LITTLE GFR LEFT

ESRD                 GFR <15            ON RRT



 

                          11                        NOTE:

CBC VERIFIED







 

                          12                        Units are mL/min/1.73 m2



Chronic Kidney Disease Staging per NKF:



Stage I & II   GFR >=60       Normal to Mildly Decreased

Stage III      GFR 30-59      Moderately Decreased

Stage IV       GFR 15-29      Severely Decreased

Stage V        GFR <15        Very Little GFR Left

ESRD           GFR <15 on RRT



 

                          13                        VITAMIN B12 NORMAL RANGE



NORMAL                     247 - 911 PG/ML

INDETERMINATE              211 - 246 PG/ML

DEFICIENT              LESS THAN 211 PG/ML



 

                          14                        FOLATE NORMAL RANGE



NORMAL             GREATER THAN 5.4 NG/ML

INDETERMINATE               3.4-5.4 NG/ML

DEFICIENT             LESS THAN 3.4 NG/ML



 

                          15                        .

The QuantiFERON-TB Gold Plus result is determined by

subtracting the Nil value from either TB antigen (Ag) tube.

The mitogen tube serves as a control for the test.



 

                          16                        The specimen received for Qu

antiFERON testing was incubated

by the ordering institution. Specific procedures outlined

in our Directory of Services and in the package insert for

the QuantiFERON Gold (In Tube) test must be followed to

enable for proper stimulation of cells for the production

of interferon gamma. Chemiluminescence immunoassay

methodology

Performed at:  [address]  [id number]

: Araceli B Reyes MD, Phone:  [phone]



 

                          17                        Status of Immunity          

           Anti-HBs Level

                                        ------------------                     -

-------------

Inconsistent with Immunity                   0.0 - 9.9

Consistent with Immunity                          >9.9



 

                          18                        Performed at:  [address]  [id number]

: Araceli B Reyes MD, Phone:  [phone]



 

                          19                        SEE SEPARATE REPORT



 

                          20                        SEE SEPARATE REPORT



 

                          21                        SEE SEPARATE REPORT

Testing performed at reference lab . Report copy to follow

on a separate form. 21 REF LAB#:[id number]









Procedures





                Date            Code            Description     Status

 

                2021      91623           Chronic Care Management Services

 Ea Addl 20 Min Completed

 

                    2021          65735               Chronic Care MGMT 20

 Mins Clinical Staff Time Per Calendar 

Month                                   Completed

 

                2021      23601           Office/Outpatient Established Mo

d MDM 30-39 Min Completed

 

                    2021          06655               Chronic Care MGMT 20

 Mins Clinical Staff Time Per Calendar 

Month                                   Completed

 

                2021      66959           Office/Outpatient Established Lo

w MDM 20-29 Min Completed

 

                2021      [id number]       Diabetic Retinal Eye Exam [facility]

 

                2020      [id number]       Bone Mineral Density Test [facility]

 

                2020      [id number]        Mammogram       Completed

 

                2019      [id number]       Diabetic Retinal Eye Exam Comple

[facility]

 

                2018      [id number]       Bone Mineral Density Test Comple

[facility]

 

                2018      [id number]        Mammogram       Completed

 

                2016      [id number]        Mammogram       Completed

 

                2016      [id number]        Mammogram       Completed

 

                2014      [id number]        Mammogram       Completed

 

                2011      [id number]        Mammogram       Completed

 

                2011      [id number]       Bone Mineral Density Test Comple

[facility]

 

                2010      [id number]       Diabetic Foot Exam Completed

 

                2010      [id number]        Mammogram       Completed

 

                10/22/2008      [id number]       Bone Mineral Density Test [facility]

 

                2006      [id number]        Mammogram       Completed







[employer]





                                        Description

 

                                        No Information Available







Encounters





           Type       Date       Location   Provider   Dx         Diagnosis

 

             Office Visit 2021  9:00a [facility] Olya Hutchins, FN 

E11.21                                  Type 2 diabetes mellitus with diabetic n

ephropathy

 

                          N18.31                    Chronic kidney disease, stag

e 3a

 

                          D69.6                     Thrombocytopenia, unspecifie

d

 

                          K21.9                     Gastro-esophageal reflux dis

ease without esophagitis

 

                          E78.00                    Pure hypercholesterolemia, u

nspecified

 

                          E55.9                     Vitamin D deficiency, unspec

ified

 

                          F41.9                     Anxiety disorder, unspecifie

d

 

                          D50.9                     Iron deficiency anemia, unsp

ecified

 

                          K50.918                   Crohn's disease, unspecified

, with other complication

 

                          N95.2                     Postmenopausal atrophic vagi

nitis

 

                          E78.2                     Mixed hyperlipidemia

 

                          Z93.2                     Ileostomy status

 

             Office Visit 2021  2:20p [facility] Olya Hutchins, FNP 

E11.21                                  Type 2 diabetes mellitus with diabetic n

ephropathy

 

                          N18.31                    Chronic kidney disease, stag

e 3a

 

                          Z93.2                     Ileostomy status

 

                          K21.9                     Gastro-esophageal reflux dis

ease without esophagitis

 

                          E78.00                    Pure hypercholesterolemia, u

nspecified

 

                          E55.9                     Vitamin D deficiency, unspec

ified

 

                          F41.9                     Anxiety disorder, unspecifie

d

 

                          D50.9                     Iron deficiency anemia, unsp

ecified

 

                          K50.918                   Crohn's disease, unspecified

, with other complication

 

                          H93.13                    Tinnitus, bilateral

 

                          H61.23                    Impacted cerumen, bilateral







Assessments





                Date            Code            Description     Provider

 

                2021      N18.31          Chronic kidney disease, stage 3a

 Dhaval Miranda MD

 

                2021      K21.9           Gastro-esophageal reflux disease

 without esophagitis Dhaval Miranda MD

 

                2021      E78.00          Pure hypercholesterolemia, unspe

cified Dhaval Miranda MD

 

                2021      E11.21          Type 2 diabetes mellitus with di

abetic nephropathy NIMA Douglas

 

                2021      N18.31          Chronic kidney disease, stage 3a

 MITCHELL DouglasP

 

                2021      D69.6           Thrombocytopenia, unspecified An

n [place], [facility]

 

                2021      K21.9           Gastro-esophageal reflux disease

 without esophagitis Olya Le 

[place], [facility]

 

                2021      E78.00          Pure hypercholesterolemia, unspe

cified Olya [place], [facility]

 

                2021      E55.9           Vitamin D deficiency, unspecifie

d Olya [place], [facility]

 

                2021      F41.9           Anxiety disorder, unspecified An

n [place], [facility]

 

                2021      D50.9           Iron deficiency anemia, unspecif

ied Olya [place], [facility]

 

                2021      K50.918         Crohn's disease, unspecified, wi

th other complication Olya Le 

[place], [facility]

 

                2021      N95.2           Postmenopausal atrophic vaginiti

s Olya [place], [facility]

 

                2021      E78.2           Mixed hyperlipidemia Olya [place]

, [facility]

 

                2021      Z93.2           Ileostomy status Olya [place], 

P

 

                2021      E11.21          Type 2 diabetes mellitus with di

abetic nephropathy Dhaval Miranda MD

 

                2021      N18.31          Chronic kidney disease, stage 3a

 Dhaval Miranda MD

 

                2021      K21.9           Gastro-esophageal reflux disease

 without esophagitis Dhaval Miranda MD

 

                2021      E78.00          Pure hypercholesterolemia, unspe

cified Dhaval Miranda MD

 

                2021      E11.21          Type 2 diabetes mellitus with di

abetic nephropathy Olya Le 

[place], [facility]

 

                2021      N18.31          Chronic kidney disease, stage 3a

 Olya [place], [facility]

 

                2021      Z93.2           Ileostomy status Olya [place], FN

P

 

                2021      K21.9           Gastro-esophageal reflux disease

 without esophagitis Olya Le 

[place], [facility]

 

                2021      E78.00          Pure hypercholesterolemia, unspe

cified Olya [place], [facility]

 

                2021      E55.9           Vitamin D deficiency, unspecifie

d Olya [place], [facility]

 

                2021      F41.9           Anxiety disorder, unspecified An

n [place], [facility]

 

                2021      D50.9           Iron deficiency anemia, unspecif

ied Olya [place], [facility]

 

                2021      K50.918         Crohn's disease, unspecified, wi

th other complication Olya [place], [facility]

 

                2021      H93.13          Tinnitus, bilateral Olya Bates,

 [facility]

 

                2021      H61.23          Impacted cerumen, bilateral NIMA Douglas







Plan of Treatment

Future Appointment(s):* 2022  9:00 am - Nurse #2 at [facility]

* 2022  9:20 am - NIMA Douglas at [facility]





Functional Status





                                        Description

 

                                        No Information Available







Mental Status





                                        Description

 

                                        No Information Available







Referrals





                Refer to      Reason for Referral Status          Appt Date

 

                [facility] Hematology/Oncology NP CONSULT FOR ELEVATED PLATELETS  Jesus paniagua Notified 

2021

 

                                        [address] [place]  [address]

 

                                        [phone]

## 2021-10-28 NOTE — CCD
Continuity of Care Document (CCD)

                             Created on: 2021



Vanesa Kinsey

External Reference #: [id number]

: 1955

Sex: Female



Demographics





                          Address                   [address] [place] [address].

[place]  [address]

 

                          Home Phone                [phone]

 

                          Preferred Language        Unknown

 

                          Marital Status            Unknown

 

                          Holiness Affiliation     Unknown

 

                          Race                      White

 

                          Ethnic Group              Declined to Specify/Unknown





Author





                          Author                    Vanesa PALOMO M.D.

 

                          Organization              Unknown

 

                          Address                   [address] [place] [address], [address]

[place]  [address]



 

                          Phone                     [phone]







Care Team Providers





                    Care Team Member Name Role                Phone

 

                    Josephine Traore M.D. AUTM                [phone]

 

                    Olya Bangura  AUTM                [phone]







Problems





                                        Description

 

                                        No Information Available







Social History





                Type            Date            Description     Comments

 

                Birth Sex                       Unknown          

 

                ETOH Use                        2 A Week         

 

                Tobacco Use     Start: Unknown  Non Smoker       







Allergies, Adverse Reactions, Alerts





             Active Allergies Criticality  Reaction | Severity Comments     Date

 

             Tetracycline Unable to assess criticality                          

 2021







Medications





           Active Medications SIG        Qnty       Indications Ordering Provide

r Date

 

                          Stelara                     90mg/ml Soln Prefill Syrin

ge                   90 mg

dose, subcutaneous injection every 8 weeks ( to be started 8 weeks after the iv 
induction dose). 2ml             K50.812         Israel Palomo M.D. 

 

           Fish Oil 1400MG Softgel 2caps po qd                       Unknown    



 

           Jay 3 Eye Drops instill bid                       Unknown    0

000

 

             Ocuvite-Lutein                      Capsules                   1cap

 po qd                             

Unknown                                 

 

                          Omeprazole                     20mg Capsules        

            1cap by mouth 

twice a day     60caps                          Israel Palomo M.D. 

 

             Womens One Daily                      Tablets                   1ta

b po qd                             

Unknown                                 

 

                          Vitamin D                     25mcg (1000 Ut) Tablets 

                  2tabs po

qd                                              Unknown         

 

             Aspirin 81                     81mg Tablets                    1t

ab po qd                             

Unknown                                 

 

                          Ferrous Sulfate                     325(65Fe) mg Table

ts                    

1tab po qd                                      Unknown         

 

             Melatonin                     10mg Capsules                   1cap 

po qhs                            

Unknown                                 

 

                                        Fortify Probiotic Womens Extra Strength 

                     Capsules DR        

             1cap po qd                             Unknown      

 

           Calcium + D 1200MG/D 1tab po qd                       Unknown    

 

                          Atorvastatin Calcium                     20mg Tablets 

                  1tab po 

qd                                              Unknown         

 

                          Metformin HCL                     500mg Tablets       

            2tabs (1000mg)

po qd                                           Unknown         

 

                Buspirone HCL                     15mg Tablets                  

 1tab po bid                      

                          Unknown                   

 

                                        Bupropion Hydrochloride ER (XL)         

            150mg Tablets ER 24HR       

             1tab po qd                             Unknown      

 

             Lisinopril                     2.5mg Tablets                   1tab

 po qd                             

Unknown                                 

 

                                        History Medications

 

                          Prednisone                     10mg Tablets           

        take 40 mg dose 

daily for 20 days, then 20 mg dose for 5 days and then 10 mg dose for 5 days and
then 5 mg dose for 6 days. 98tabs          K50.812         Israel Palomo M.D. 2021 -

2021

 

                          Stelara                     130mg/26ML Solution       

            260 mg 

induction dose once. ( to be followed with subcutaneous maintenance dose90 mg 
every 8 weeks)  52ml                            Israel Palomo M.D. 2021 - 2021







Immunizations





                                        Description

 

                                        No Information Available







Vital Signs





                Date            Vital           Result          Comment

 

                2021 12:23pm BP Systolic     112 mmHg         

 

                    BP Diastolic        64 mmHg              

 

                    Height              60.5 inches         5'0.50"

 

                    Weight              124.00 lb            

 

                    BMI (Body Mass Index) 23.8 kg/m2           

 

                    Ideal Body Weight   100 lb               

 

                    Weight              56.246 kg            

 

                    BSA (Body Surface Area) 1.53 m2              

 

                2021 12:11pm BP Systolic     126 mmHg         

 

                    BP Diastolic        62 mmHg              

 

                    Height              60.5 inches         5'0.50"

 

                    Weight              122.00 lb            

 

                    BMI (Body Mass Index) 23.4 kg/m2           

 

                    Ideal Body Weight   100 lb               

 

                    Weight              55.339 kg            

 

                    BSA (Body Surface Area) 1.52 m2              







Results





        Test    Acquired Date Facility Test    Result  H/L     Range   Note

 

                    CBC With Differential 2021          [facility]

                                        [address] [place] [address] [phone] White Blood Count 10.4 10    High       4.0-10.0    

 

             Red Blood Count 4.13 10      Normal       4.00-5.40     

 

             Hemoglobin   11.9 g/dL    Low          12.0-15.5     

 

             Hematocrit   36.9 %       Normal       36.0-47.0     

 

             Mean Corpuscular Volume 89.3 fl      Normal       80.0-96.0     

 

             Mean Corpuscular Hemoglobin 28.8 pg      Normal       27.0-33.0    

 

 

             Mean Corpuscular HGB Conc 32.2 g/dL    Normal       32.0-36.5     

 

             Red Cell Distribution Width 19.7 %       High         11.5-14.5    

 

 

             Platelet Count, Automated 454 10       High         150-450       

 

             Neutrophils % 68.2 %       High         36.0-66.0     

 

             Lymph %      21.9 %       Low          24.0-44.0     

 

             Mono %       8.1 %        High         2.0-8.0       

 

             Eos %        0.7 %        Normal       0.0-3.0       

 

             Baso %       0.6 %        Normal       0.0-1.0       

 

             Immature Granulocyte % 0.5 %        Normal       0-3.0         

 

             Nucleated Red Blood Cell % 0.0 %        Normal       0-0           

 

             Neutrophils # 7.1 10       Normal       1.5-8.5       

 

             Lymph #      2.3 10       Normal       1.5-5.0       

 

             Mono #       0.9 10       High         0.0-0.8       

 

             Eos #        0.1 10       Normal       0.0-0.5       

 

             Baso #       0.1 10       Normal       0.0-0.2       

 

                    Laboratory test finding 2021          [facility]

                                        [address] [place] [address] [phone] C Reactive Protein Quantitativ 0.64 mg/dL High       0

.00-0.30   

 

             Erythrocyte Sedimentation Rate 39 mm/hr     High         0-30      

    

 

                    Basic Metabolic Profile 2021          [facility]

                                        [address] [place] [address] [phone] Glucose, Fasting 126 mg/dL  High             

 

             Blood Urea Nitrogen 13 mg/dL     Normal       7-18          

 

             Creatinine For GFR 0.84 mg/dL   Normal       0.55-1.30     

 

             Glomerular Filtration Rate > 60.0       Normal       >45          1

 

             Sodium Level 140 mEq/L    Normal       136-145       

 

             Potassium Serum 4.4 mEq/L    Normal       3.5-5.1       

 

             Chloride Level 106 mEq/L    Normal               

 

             Carbon Dioxide Level 28 mEq/L     Normal       21-32         

 

             Anion Gap    6 mEq/L      Low          8-16          

 

             Calcium Level 9.6 mg/dL    Normal       8.8-10.2      

 

                    Liver Profile       2021          [facility]

nter Main Lab

                                        [address] [place] [address] [phone] Ast/Sgot   20 U/L     Normal     7-37        

 

             Alt/SGPT     30 U/L       Normal       12-78         

 

             Alkaline Phosphatase 95 U/L       Normal               

 

             Bilirubin,Total 0.4 mg/dL    Normal       0.2-1.0       

 

             Bilirubin,Direct 0.1 mg/dL    Normal       0.0-0.2       

 

             Total Protein 6.9 GM/DL    Normal       6.4-8.2       

 

             Albumin      3.3 GM/DL    Normal       3.2-5.2       

 

             Albumin/Globulin Ratio 0.9          Low          1.2-2.2       

 

                    Vitamin B12 & Folate 2021          [facility]

enter Main Lab

                                        [address] [place] [phone] Vitamin B12 Level 598 pg/mL  Normal                2

 

             Folate       > 24.0 NG/ML Normal                    3

 

                    Total Iron Binding Capacit 2021          [facility]

                                        [address] [place] [phone] Iron (Fe)  33 g/dL  Low              

 

             Total Iron Binding Capacity 424 g/dL   Normal       250-450      

 

 

             Percent Saturation 7.8 %        Low          13.2-45.0     

 

                    Laboratory test finding 2021          [facility]

                                        [address] [place] [phone] Calprotectin Stool 40 ug/g    Normal     0-120      4

 

                    Fat Fecal Qualitative 2021          [facility]

                                        [address] [place] [phone] Fats Neutral Normal     Normal     .          5

 

             Fats Total   Normal       Normal       .            6

 

                    Laboratory test finding 2021          [facility]

                                        [address] [place] [phone] Pancreatic Elastase Stool TNP        Normal           

     7

 

                    BUN & Creatinine ([facility]) 2021          [facility]

                                        [address] [place] [phone] Blood Urea Nitrogen 16 mg/dL   Normal     7-18        

 

                    Creatinine With GFR 2021          [facility]

nter Main Lab

                                        [address] [place] [address]

           [phone] Creatinine For GFR 0.80 mg/dL Normal     0.55-1.30   

 

             Glomerular Filtration Rate > 60.0       Normal       >45          8







                          1                         Units are mL/min/1.73 m2



Chronic Kidney Disease Staging per [facility]:



Stage I & II   GFR >=60       Normal to Mildly Decreased

Stage III      GFR 30-59      Moderately Decreased

Stage IV       GFR 15-29      Severely Decreased

Stage V        GFR <15        Very Little GFR Left

ESRD           GFR <15 on RRT



 

                          2                         VITAMIN B12 NORMAL RANGE



NORMAL                     247 - 911 PG/ML

INDETERMINATE              211 - 246 PG/ML

DEFICIENT              LESS THAN 211 PG/ML



 

                          3                         FOLATE NORMAL RANGE



NORMAL             GREATER THAN 5.4 NG/ML

INDETERMINATE               3.4-5.4 NG/ML

DEFICIENT             LESS THAN 3.4 NG/ML



 

                          4                         Concentration     Interpreta

tion   Follow-Up

<16 - 50 ug/g     Normal           None

>50 -120 ug/g     Borderline       Re-evaluate in 4-6 weeks

>120 ug/g     Abnormal         Repeat as clinically

indicated



 

                          5                         Normal (<60 Droplets/HPF)



 

                          6                         Normal (<100 Droplets/HPF)



 

                          7                         Test not performed. Consiste

ncy of stool specimen too watery

for analysis.

TEST: [id number] Pancreatic Elastase, Fecal



Testing performed at reference lab . Report copy to follow

on a separate form. 21 REF LAB#:[id number]



 

                          8                         Units are mL/min/1.73 m2



Chronic Kidney Disease Staging per [facility]:



Stage I & II   GFR >=60       Normal to Mildly Decreased

Stage III      GFR 30-59      Moderately Decreased

Stage IV       GFR 15-29      Severely Decreased

Stage V        GFR <15        Very Little GFR Left

ESRD           GFR <15 on RRT









Procedures





                Date            Code            Description     Status

 

                2021      02888           Office/Outpatient Established Mo

d MDM 30-39 Min Completed

 

                2021      24547           Office/Outpatient Established Lo

w MDM 20-29 Min Completed







[employer]





                                        Description

 

                                        No Information Available







Encounters





           Type       Date       Location   Provider   Dx         Diagnosis

 

                Office Visit    2021 11:40a [facility] Sowmya Palomo M.D.           K50.812                   Crohn's disease of both smal

l and lg int w intestinal 

obst

 

                          Z93.2                     Ileostomy status

 

                Office Visit    2021  3:20p [facility] Sowmya Palomo M.D.           K50.812                   Crohn's disease of both smal

l and lg int w intestinal 

obst

 

                          Z93.2                     Ileostomy status







Assessments





                Date            Code            Description     Provider

 

                    2021          K50.812             Crohn's disease of b

oth small and large intestine with 

intestinal obstruction                  Israel Palomo M.D.

 

                2021      Z93.2           Ileostomy status Israel conner M.D.

 

                    2021          K50.812             Crohn's disease of b

oth small and large intestine with 

intestinal obstruction                  Israel Palomo M.D.

 

                2021      Z93.2           Ileostomy status Israel conner M.D.







Plan of Treatment





No Information Available



Functional Status





                                        Description

 

                                        No Information Available







Mental Status





                                        Description

 

                                        No Information Available







Referrals





                                        Description

 

                                        No Information Available

## 2021-10-28 NOTE — CCD
Continuity of Care Document (CCD)

                             Created on: 10/24/2021



Vanesa Kinsey

External Reference #: [id number]

: 1955

Sex: Female



Demographics





                          Address                   [address] [place]  [address]

 

                          Home Phone                [phone]

 

                          Preferred Language        Unknown

 

                          Marital Status            Unknown

 

                          Presybeterian Affiliation     Unknown

 

                          Race                      White

 

                          Ethnic Group              Not  or 





Author





                          Author                    Vanesa Escalante M.D.

 

                          Organization              Unknown

 

                          Address                   [phone]  [address]



 

                          Phone                     [phone]







Care Team Providers





                    Care Team Member Name Role                Phone

 

                    Jessica Cervantes MD    AUTM                [phone]

 

                    Olya Bangura ANP   AUTM                [phone]

 

                    Israel Farmer MD AUTM                [phone]







Problems





                    Active Problems     Provider            Date

 

                    Pulmonary embolism  NIMA Douglas    Onset: 04/10/2012

 

                    Gastroesophageal reflux disease LUCRETIA Ring Onset

: 04/10/2012

 

                    Pure hypercholesterolemia LUCRETIA Ring Onset: 

 

                    Type 2 diabetes mellitus LUCRETIA Ring Onset: 04/10

/2012

 

                    Anemia              Marc Rodriguez D.O. Onset: 04/10/

2012







Social History





                Type            Date            Description     Comments

 

                Birth Sex                       Unknown          

 

                ETOH Use                        Occasionally consumes beer  

 

                Tobacco Use     Start: Unknown End: Unknown Patient is a former 

smoker SMOKED FOR 

15YRS 1 MAGGY A DAY quit age 37 







Allergies and adverse reactions





             Active Allergies Criticality  Reaction | Severity Comments     Date

 

             Tetracycline Unable to assess criticality              rash        

 2010







Medications





           Active Medications SIG        Qnty       Indications Ordering Provide

r Date

 

                          Alprazolam                     0.25mg Tablets         

          1/2 - one tablet

twice daily as needed anxiety 10tabs                          NIMA Douglas 0

2021

 

                          Zolpidem Tartrate                     5mg Tablets     

              take one 

tablet by mouth at bedtime as needed for sleep maximum daily dose = 1 tablet mdd
1               7tabs                           NIMA Douglas 06/10/2021

 

                          Estrace                     0.1mg/GM Cream            

       1/4 applicatorful 

vaginally at bedtime daily x 14 days then three times a week 42.500gm           

                     Olya Bates [facility]                               2021

 

                    Lutein                     20mg Capsules                   1

 by mouth every day 

                                        Olya Bates [facility]    2021

 

                          Lisinopril                     2.5mg Tablets          

         1 by mouth every 

day             90tabs          E11.21          [facility]

 

           Calcium + D 1200MG/D                         Josephine Escalante M.D. 

2019

 

                                        Bupropion Hydrochloride ER (XL)         

            150mg Tablets ER 24HR       

             take 1 tablet by mouth once daily 90tabs                    Olya Bates [facility] 

10/15/2019

 

                                        Fluticasone Propionate                  

   50mcg/Act Suspension                 

             2 sprays each nostril daily X 2 Weeks Then prn 16gm                

      [facility] 

2019

 

                                        Fortify Probiotic Womens Extra Strength 

                     Capsules Cait Walls,[facility] 20

18

 

                          Buspirone HCL                     15mg Tablets        

           Take 1 Tablet 

By Mouth Twice Daily 180tabs                         Olya Bates [facility] 2018

 

                          Vitamin D                     1000Unit Tablets        

           2 by mouth 

every day                                       Emily Aleman, [facility] 

8

 

                          Onetouch Ultra Blue                      Strips       

            use twice 

daily to check blood sugar dx e11.9 200units                        [facility] 10/11/2017

 

             Melatonin                     10mg Tablets                   1 hs p

o                                [facility]                             2016

 

                          Metformin HCL ER                     500mg Tablets ER 

24HR                   

take 2 tablets by mouth once daily with the largest meal of the day 180tabs     

                            

Olya Bates [facility]                        2016

 

                          Aspirin Childrens                     81mg Chewtabs   

                1 by mouth

every hs                                        [facility] 2016

 

                          Loperamide HCL                     2mg Capsules       

            4 capsules at 

bedtime by mouth may take additional 4 capsules in daytime - not to exceed 16mg 
daily           240caps                         Olya Bates [facility] 2016

 

                          Ferrous Sulfate                     325(65Fe) mg Table

ts DR                   1 

by mouth every day                                 [facility] 

6

 

                          Womens One Daily                      Tablets         

          1 by mouth every

day                                             Emily Aleman, [facility] 

6

 

                          Atorvastatin Calcium                     20mg Tablets 

                  Take 1 

Tablet By Mouth Every Day 90tabs                          Olya Bates [facility] 

 

                          Omeprazole                     20mg Capsules DR       

            take 1 capsule

by mouth twice daily. maximum daily dose is 2 180caps                         An

n Krystal Marley, [facility] 

2010

 

             Similasan Earache Relief                      Solution             

                                             

Unknown                                 

 

           Omega-3 Drops For Eyes                                  Unknown    

 

                          Budesonide                     3mg Caps DR Part       

            3 tabs daily 

before dinner for four weeks, then 2 tabs daily for four weeks, then 1 tab daily
for remaining four weeks- Dr. Farmer's                                 Unknow

n         

 

                Stelara                     90mg/ml Soln Prefill Syringe        

                                            

                          Unknown                   







Medications Administered in Office





           Medication SIG        Qnty       Indications Ordering Provider Date

 

                          Administration Of Flu Vaccine                      Inj

ection                    

                                                Olya Krystal Donell, [facility] 2021

 

                          Covid-19 vaccine, Unspecified                      Inj

ection                    

                                                Unknown         2021

 

                          Covid-19 vaccine, Unspecified                      Inj

ection                    

                                                Unknown         2021

 

                                        Immunization Adminstration,1 Vaccine/Tox

oid                      Injection      

                                                    Cait Henson,[facility] 20

20







Immunizations





           CPT Code   Status     Date       Vaccine    Reaction   Lot #

 

                [id number]           Given           2021      Influenza Vaccin

e Quadrivalent Preser/Antibiotic Free Im 

Use                                                 [id number]

 

           07137      Given      2020 Pneumovax 23            [id number]

 

           27862      Given      2020 Adacel- Tetanus Diphtheria Pertussis

            [id number]

 

           U-Flu      Given      2019 Influenza,Unspecified             

 

           U-PneuC    Given      07/15/2019 Prevnar 13             

 

                14377           Given           07/15/2019      Shingrix Zoster 

Vaccine (HZV), Recombinant, Subunit, 

Adjuvanted                                           

 

           38943      Given      05/15/2019 Shingrix               

 

           U-Flu      Given      10/11/2018 Influenza,Unspecified             

 

           90384      Given      2017 PPD        0 mm read by Lashaun ortega, SIRENA [id number]

 

                 Given      2016 Fluvirin Virus Vaccine            16

[id number]

 

           88284      Given      10/10/2003 Pneumovax 23             







Vital Signs





                Date            Vital           Result          Comment

 

                2021  2:09pm BP Systolic     112 mmHg         

 

                    BP Diastolic        70 mmHg              

 

                    Heart Rate          98 /min              

 

                    Height              61.25 inches        5'1.25"

 

                    Weight              122.00 lb            

 

                    O2 % BldC Oximetry  97 %                 

 

                    BMI (Body Mass Index) 22.9 kg/m2           

 

                2021 10:32am BP Systolic     130 mmHg         

 

                    BP Diastolic        64 mmHg              

 

                    Heart Rate          88 /min              

 

                    Height              61.25 inches        5'1.25"

 

                    Weight              125.00 lb            

 

                    O2 % BldC Oximetry  96 %                 

 

                    BMI (Body Mass Index) 23.4 kg/m2           







Results





        Test    Acquired Date Facility Test    Result  H/L     Range   Note

 

                    Coronavirus 2019 Nasopharygeal 10/23/2021          [address] [address] [phone] Coronavirus 2019 Nasopharygeal ASSAY INFORMATIO <SEE N

OTE>                        1

 

                    CBC With Differential 2021          [address] [address] [phone] White Blood Count 10.4 10    High       4.0-10.0    

 

             Red Blood Count 4.13 10      Normal       4.00-5.40     

 

             Hemoglobin   11.9 g/dL    Low          12.0-15.5     

 

             Hematocrit   36.9 %       Normal       36.0-47.0     

 

             Mean Corpuscular Volume 89.3 fl      Normal       80.0-96.0     

 

             Mean Corpuscular Hemoglobin 28.8 pg      Normal       27.0-33.0    

 

 

             Mean Corpuscular HGB Conc 32.2 g/dL    Normal       32.0-36.5     

 

             Red Cell Distribution Width 19.7 %       High         11.5-14.5    

 

 

             Platelet Count, Automated 454 10       High         150-450       

 

             Neutrophils % 68.2 %       High         36.0-66.0     

 

             Lymph %      21.9 %       Low          24.0-44.0     

 

             Mono %       8.1 %        High         2.0-8.0       

 

             Eos %        0.7 %        Normal       0.0-3.0       

 

             Baso %       0.6 %        Normal       0.0-1.0       

 

             Immature Granulocyte % 0.5 %        Normal       0-3.0         

 

             Nucleated Red Blood Cell % 0.0 %        Normal       0-0           

 

             Neutrophils # 7.1 10       Normal       1.5-8.5       

 

             Lymph #      2.3 10       Normal       1.5-5.0       

 

             Mono #       0.9 10       High         0.0-0.8       

 

             Eos #        0.1 10       Normal       0.0-0.5       

 

             Baso #       0.1 10       Normal       0.0-0.2       

 

                    Laboratory test finding 2021          [address] [address] [phone] Erythrocyte Sedimentation Rate 39 mm/hr   High       0

-30        

 

                    Liver Profile       2021          [facility]

nter

                                        [address] [place] [address] [phone] Ast/Sgot   20 U/L     Normal     7-37        

 

             Alt/SGPT     30 U/L       Normal       12-78         

 

             Alkaline Phosphatase 95 U/L       Normal               

 

             Bilirubin,Total 0.4 mg/dL    Normal       0.2-1.0       

 

             Bilirubin,Direct 0.1 mg/dL    Normal       0.0-0.2       

 

             Total Protein 6.9 GM/DL    Normal       6.4-8.2       

 

             Albumin      3.3 GM/DL    Normal       3.2-5.2       

 

             Albumin/Globulin Ratio 0.9          Low          1.2-2.2       

 

                    Basic Metabolic Profile 2021          [address] [address] [phone] Glucose, Fasting 126 mg/dL  High             

 

             Blood Urea Nitrogen 13 mg/dL     Normal       7-18          

 

             Creatinine For GFR 0.84 mg/dL   Normal       0.55-1.30     

 

             Glomerular Filtration Rate > 60.0       Normal       >45          2

 

             Sodium Level 140 mEq/L    Normal       136-145       

 

             Potassium Serum 4.4 mEq/L    Normal       3.5-5.1       

 

             Chloride Level 106 mEq/L    Normal               

 

             Carbon Dioxide Level 28 mEq/L     Normal       21-32         

 

             Anion Gap    6 mEq/L      Low          8-16          

 

             Calcium Level 9.6 mg/dL    Normal       8.8-10.2      

 

                    Total Iron Binding Capacit 2021          [facility]

                                        [address] [place] [address] [phone] Iron (Fe)  33 g/dL  Low              

 

             Total Iron Binding Capacity 424 g/dL   Normal       250-450      

 

 

             Percent Saturation 7.8 %        Low          13.2-45.0     

 

                    Vitamin B12 & Folate 2021          [facility]

enter

                                        [address] [place] [address] [phone] Vitamin B12 Level 598 pg/mL  Normal                3

 

             Folate       > 24.0 NG/ML Normal                    4

 

                    Laboratory test finding 2021          [address] [address] [phone] C Reactive Protein Quantitativ 0.64 mg/dL High       0

.00-0.30   

 

                    Fat Fecal Qualitative 2021          [address] [address] [phone] Fats Neutral Normal     Normal     .          5

 

             Fats Total   Normal       Normal       .            6

 

                    Laboratory test finding 2021          [address] [address] [phone] Pancreatic Elastase Stool TNP        Normal           

     7

 

             Calprotectin Stool 40 ug/g      Normal       0-120        8

 

                    CBC With Differential 2021          [address] [address] [phone] White Blood Count 15.0 10    High       4.0-10.0    

 

             Red Blood Count 4.63 10      Normal       4.00-5.40     

 

             Hemoglobin   12.9 g/dL    Normal       12.0-15.5     

 

             Hematocrit   39.2 %       Normal       36.0-47.0     

 

             Mean Corpuscular Volume 84.7 fl      Normal       80.0-96.0     

 

             Mean Corpuscular Hemoglobin 27.9 pg      Normal       27.0-33.0    

 

 

             Mean Corpuscular HGB Conc 32.9 g/dL    Normal       32.0-36.5     

 

             Red Cell Distribution Width 19.1 %       High         11.5-14.5    

 

 

             Platelet Count, Automated 319 10       Normal       150-450       

 

             Neutrophils % 92.5 %       High         36.0-66.0     

 

             Lymph %      5.4 %        Low          24.0-44.0     

 

             Mono %       0.7 %        Low          2.0-8.0       

 

             Eos %        0.0 %        Normal       0.0-3.0       

 

             Baso %       0.1 %        Normal       0.0-1.0       

 

             Immature Granulocyte % 1.3 %        Normal       0-3.0         

 

             Nucleated Red Blood Cell % 0.0 %        Normal       0-0           

 

             Neutrophils # 13.9 10      High         1.5-8.5       

 

             Lymph #      0.8 10       Low          1.5-5.0       

 

             Mono #       0.1 10       Normal       0.0-0.8       

 

             Eos #        0.0 10       Normal       0.0-0.5       

 

             Baso #       0.0 10       Normal       0.0-0.2       

 

                    Laboratory test finding 2021          [address] [address] [phone]  Jak2 Mutations For Path Sendou See Pathology Re 

<SEE NOTE>  

Normal                                              9

 

                    BCR/Abl Screen For CML Path 2021          [address] [address] [phone]  BCR/Abl Screen For CML Path So See Pathology Re 

<SEE NOTE>  

Normal                                              10

 

                    Laboratory test finding 2021          [address] [id number] [phone] Vitamin B12 Level 580 pg/mL  Normal     247-911    11

 

             Ferritin     19 NG/ML     Normal       8-252         

 

                    Total Iron Binding Capacit 2021          [address] [address] [phone] Iron (Fe)  139 g/dL Normal           

 

             Total Iron Binding Capacity 494 g/dL   High         250-450      

 

 

             Percent Saturation 28.1 %       Normal       13.2-45.0     

 

                    Comprehensive Metabolic Profil 2021          [address] [address] [phone] Glucose, Fasting 121 mg/dL  High             

 

             Blood Urea Nitrogen 27 mg/dL     High         7-18          

 

             Creatinine For GFR 1.00 mg/dL   Normal       0.55-1.30     

 

             Glomerular Filtration Rate 58.9         Normal       >45          1

2

 

             Sodium Level 137 mEq/L    Normal       136-145       

 

             Potassium Serum 4.2 mEq/L    Normal       3.5-5.1       

 

             Chloride Level 106 mEq/L    Normal               

 

             Carbon Dioxide Level 25 mEq/L     Normal       21-32         

 

             Anion Gap    6 mEq/L      Low          8-16          

 

             Calcium Level 8.9 mg/dL    Normal       8.8-10.2      

 

             Ast/Sgot     26 U/L       Normal       7-37          

 

             Alt/SGPT     40 U/L       Normal       12-78         

 

             Alkaline Phosphatase 79 U/L       Normal               

 

             Bilirubin,Total 0.5 mg/dL    Normal       0.2-1.0       

 

             Total Protein 7.5 GM/DL    Normal       6.4-8.2       

 

             Albumin      3.4 GM/DL    Normal       3.2-5.2       

 

             Albumin/Globulin Ratio 0.8          Low          1.2-2.2       

 

                    Complete Blood Count 2021          [facility]

s, pc

: Dr Dhaval Miranda

[place] [address]

           [phone] WBC        9.2 x10*3/UL            4.1 - 10.9 13

 

             RBC          4.37 x10*6/UL              4.20 - 6.30   

 

             Hemoglobin   12.3 g/dL                 12.0 - 18.0   

 

             Hematocrit   36.6 %       Low          37.0 - 51.0   

 

             MCV          83.6 fL                   80.0 - 97.0   

 

             MCH          28.2 pg                   26.0 - 32.0   

 

             MCHC         33.7 g/dL                 31.0 - 38.0   

 

             RDW          15.5 %       High         11.6 - 13.7   

 

             PLT          506 x10*3/UL High         140 - 440     

 

             MPV          7.1 FL       Low          7.8 - 11.0    

 

             Lymph %      19.4 %                    10.0 - 58.5   

 

             Mid %        5.4 %                     1.7 - 9.3     

 

             Neut %       75.2 %                    37.0 - 92.0   

 

             Lymph #      1.7 x10*3/UL              0.6 - 4.1     

 

             Mid #        0.6 x10*3/UL              0.1 - 0.6     

 

             Neut #       6.9 x10*3/UL              2.0 - 7.8     

 

                    A1c                 2021          [facility]

, 

: Dr Dhaval Miranda

[place], [place] [address] [phone] Hba1c      6.0 %      High       <5.7       14

 

             Est Avg Glucose 125 mg/dL    High         60 - 110      

 

                    Comprehensive Chem Profile 2021          [facility]

[facility], 

: Dr Dhaval Miranda

[place], [place] [id number] [phone] Glucose    94 mg/dL              74 - 99    15

 

             BUN          19 mg/dL     High         7 - 18        

 

             Creatinine   1.0 mg/dL                 0.6 - 1.3     

 

             Sodium       142 mEq/L                 136 - 145     

 

             Potassium    4.1 mEq/L                 3.5 - 5.1     

 

             Chloride     104 mEq/L                 98 - 107      

 

             Carbon Dioxide 30 mEq/L                  21 - 32       

 

             Calcium      9.0 mg/dL                 8.5 - 10.1    

 

             Alk. Phosphatase 112 mg/dL                 46 - 116      

 

             Total Bilirubin 0.3 mg/dL                 0.2 - 1.0     

 

             Ast (Sgot)   17 U/L                    15 - 37       

 

             Alt (SGPT)   23 U/L                    12 - 78       

 

             Albumin      3.0 g/dL     Low          3.4 - 5.0     

 

             Total Protein 7.4 g/dL                  6.4 - 8.2     

 

             A/G Ratio    0.68 CALC    Low          1.00 - 1.90   

 

             GFR  55 mL/min    Low          >60           

 

             GFR African American >= 60 mL/min              >60          16

 

                    Lipid Profile       2021          [facility]

, pc

: Dr Dhaval Miranda

[place] [address]

           [phone] Cholesterol 143 mg/dL             131 - 200   

 

             Triglycerides 105 mg/dL                 30 - 150      

 

             HDL Cholesterol 89 mg/dL     High         35 - 60       

 

             LDL (Calculated) 33 CALC      Low          50 - 159      

 

                    Laboratory test finding 2021          [facility]

ists, pc

: Dr Dhaval Miranda

[place] [address]

           [phone] Magnesium  1.9 mg/dL             1.8 - 2.4   







                          1                         ASSAY INFORMATION: Real Time

 RT-PCR

NOTE: The COVID-19 assay has been cleared by the U.S. Food and Drug

Administration under the Emergency Use Authorization (EUA). [employer] and [facility] are designated as high complexity laboratories by the

Clinical Laboratory Improvement Amendments of 1988(CLIA) and are qualified to

perform this test.



Not Detected





 

                          2                         Units are mL/min/1.73 m2



Chronic Kidney Disease Staging per NKF:



Stage I & II   GFR >=60       Normal to Mildly Decreased

Stage III      GFR 30-59      Moderately Decreased

Stage IV       GFR 15-29      Severely Decreased

Stage V        GFR <15        Very Little GFR Left

ESRD           GFR <15 on RRT



 

                          3                         VITAMIN B12 NORMAL RANGE



NORMAL                     247 - 911 PG/ML

INDETERMINATE              211 - 246 PG/ML

DEFICIENT              LESS THAN 211 PG/ML



 

                          4                         FOLATE NORMAL RANGE



NORMAL             GREATER THAN 5.4 NG/ML

INDETERMINATE               3.4-5.4 NG/ML

DEFICIENT             LESS THAN 3.4 NG/ML



 

                          5                         Normal (<60 Droplets/HPF)



 

                          6                         Normal (<100 Droplets/HPF)



 

                          7                         Test not performed. Consiste

ncy of stool specimen too watery

for analysis.

TEST: [id number] Pancreatic Elastase, Fecal



Testing performed at reference lab . Report copy to follow

on a separate form. 21 REF LAB#:[id number]



 

                          8                         Concentration     Interpreta

tion   Follow-Up

<16 - 50 ug/g     Normal           None

>50 -120 ug/g     Borderline       Re-evaluate in 4-6 weeks

>120 ug/g     Abnormal         Repeat as clinically

indicated



 

                          9                         See Pathology Report

Specimen Collection for Pathology Reference Lab Testing.

Refer to [facility] Pathology Report for Results: [id number]



 

                          10                        See Pathology Report

Specimen Collection for Pathology Reference Lab Testing.

Refer to [facility] Pathology Report for Results:[id number]



 

                          11                        VITAMIN B12 NORMAL RANGE



NORMAL                     247 - 911 PG/ML

INDETERMINATE              211 - 246 PG/ML

DEFICIENT              LESS THAN 211 PG/ML



 

                          12                        Units are mL/min/1.73 m2



Chronic Kidney Disease Staging per NKF:



Stage I & II   GFR >=60       Normal to Mildly Decreased

Stage III      GFR 30-59      Moderately Decreased

Stage IV       GFR 15-29      Severely Decreased

Stage V        GFR <15        Very Little GFR Left

ESRD           GFR <15 on RRT



 

                          13                        NOTE:

CBC VERIFIED







 

                          14                        Lab Result Notes:

Pre-Diabetes   5.7 - 6.4 %

Diabetes           = or > 6.5%



 

                          15                        100-125 mg/dL     PRE-DIABET

ES/FASTING

>126 mg/dL          DIABETES/FASTING



 

                          16                        CHRONIC KIDNEY DISEASE STAGI

NG PER NKF



STAGE I & II      GFR >= 60        NORMAL TO MILDLY DECREASED

STAGE III          GFR 30-59          MODERATELY DECREASED

STAGE IV           GFR 15-29         SEVERELY DECREASED

STAGE V            GFR <15            VERY LITTLE GFR LEFT

ESRD                 GFR <15            ON RRT









Procedures





                Date            Code            Description     Status

 

                2021      75638           EKG/Interpretation & Report Comp

leted

 

                2021      95906           Office/Outpatient Established Lo

w MDM 20-29 Min Completed

 

                    2021          20482               Chronic Care MGMT 20

 Mins Clinical Staff Time Per Calendar 

Month                                   Completed

 

                2021      52478           Chronic Care Management Services

 Ea Addl 20 Min Completed

 

                2021      54965           Office/Outpatient Established Lo

w MDM 20-29 Min Completed

 

                    2021          76162               Chronic Care MGMT 20

 Mins Clinical Staff Time Per Calendar 

Month                                   Completed

 

                    2021          52828               Chronic Care MGMT 20

 Mins Clinical Staff Time Per Calendar 

Month                                   Completed

 

                2021      29329           Chronic Care Management Services

 Ea Addl 20 Min Completed

 

                2021      70648           Office/Outpatient Established Mo

d MDM 30-39 Min Completed

 

                    2021          83411               Chronic Care MGMT 20

 Mins Clinical Staff Time Per Calendar 

Month                                   Completed

 

                2021      [id number]       Diabetic Retinal Eye Exam Comple

[facility]

 

                2020      [id number]       Bone Mineral Density Test Comple

[facility]

 

                2020      [id number]        Mammogram       Completed

 

                2019      [id number]       Diabetic Retinal Eye Exam Comple

[facility]

 

                2018      [id number]       Bone Mineral Density Test Comple

[facility]

 

                2018      [id number]        Mammogram       Completed

 

                2016      [id number]        Mammogram       Completed

 

                2016      [id number]        Mammogram       Completed

 

                2014      [id number]        Mammogram       Completed

 

                2011      57473619        Mammogram       Completed

 

                2011      [id number]       Bone Mineral Density Test Comple

[facility]

 

                2010      [id number]       Diabetic Foot Exam Completed

 

                2010      [id number]        Mammogram       Completed

 

                10/22/2008      [id number]       Bone Mineral Density Test Comple

[facility]

 

                2006      [id number]        Mammogram       Completed







Medical Devices





                                        Description

 

                                        No Information Available







Encounters





           Type       Date       Location   Provider   Dx         Diagnosis

 

             Office Visit 2021  2:00p [facility] Olya Hutchins, FNP R07.9

                                        Chest pain, unspecified

 

                          Z23                       Encounter for immunization

 

             Office Visit 2021 10:20a [facility] Olya Hutchins, FNP 

E11.21                                  Type 2 diabetes mellitus with diabetic n

ephropathy

 

                          N18.31                    Chronic kidney disease, stag

e 3a

 

                          K21.9                     Gastro-esophageal reflux dis

ease without esophagitis

 

                          E55.9                     Vitamin D deficiency, unspec

ified

 

                          F41.9                     Anxiety disorder, unspecifie

d

 

                          D50.9                     Iron deficiency anemia, unsp

ecified

 

                          K50.918                   Crohn's disease, unspecified

, with other complication

 

                          Z93.2                     Ileostomy status

 

                          M85.80                    Oth disrd of bone density an

d structure, unspecified site

 

                          E78.1                     Pure hyperglyceridemia

 

             Office Visit 2021  9:00a [facility] Olya Hutchins, FNP 

E11.21                                  Type 2 diabetes mellitus with diabetic n

ephropathy

 

                          N18.31                    Chronic kidney disease, stag

e 3a

 

                          D69.6                     Thrombocytopenia, unspecifie

d

 

                          K21.9                     Gastro-esophageal reflux dis

ease without esophagitis

 

                          E78.00                    Pure hypercholesterolemia, u

nspecified

 

                          E55.9                     Vitamin D deficiency, unspec

ified

 

                          F41.9                     Anxiety disorder, unspecifie

d

 

                          D50.9                     Iron deficiency anemia, unsp

ecified

 

                          K50.918                   Crohn's disease, unspecified

, with other complication

 

                          N95.2                     Postmenopausal atrophic vagi

nitis

 

                          E78.2                     Mixed hyperlipidemia

 

                          Z93.2                     Ileostomy status







Assessments





                Date            Code            Description     Provider

 

                2021      R07.9           Chest pain, unspecified Olya Guillen, [facility]

 

                2021      Z23             Encounter for immunization Olya Marley, [facility]

 

                2021      E11.9           Type 2 diabetes mellitus without

 complications Dhaval Miranda MD

 

                2021      K21.9           Gastro-esophageal reflux disease

 without esophagitis Dhaval Miranda MD

 

                2021      K50.918         Crohn's disease, unspecified, wi

th other complication Dhaval Miranda MD

 

                2021      E11.21          Type 2 diabetes mellitus with di

abetic nephropathy Olya Bates, [facility]

 

                2021      N18.31          Chronic kidney disease, stage 3a

 Olya Bates, [facility]

 

                2021      K21.9           Gastro-esophageal reflux disease

 without esophagitis Olya Bates, [facility]

 

                2021      E55.9           Vitamin D deficiency, unspecifie

d Olya Bates, [facility]

 

                2021      F41.9           Anxiety disorder, unspecified An

david Bates, [facility]

 

                2021      D50.9           Iron deficiency anemia, unspecif

ied Olya Bates, [facility]

 

                2021      K50.918         Crohn's disease, unspecified, wi

th other complication Olya Bates, [facility]

 

                2021      Z93.2           Ileostomy status Olya Bates [facility]

 

                2021      M85.80          Other specified disorders of bon

e density and structure, uns 

Olya Bates, [facility]

 

                2021      E78.1           Pure hyperglyceridemia Olya Hutchins, [facility]

 

                2021      E11.9           Type 2 diabetes mellitus without

 complications Dhaval Miranda MD

 

                2021      K21.9           Gastro-esophageal reflux disease

 without esophagitis Dhaval Miranda MD

 

                2021      E55.9           Vitamin D deficiency, unspecifie

d Dhaval Miranda MD

 

                2021      N18.31          Chronic kidney disease, stage 3a

 Dhaval Miranda MD

 

                2021      K21.9           Gastro-esophageal reflux disease

 without esophagitis Dhaval Miranda MD

 

                2021      E78.00          Pure hypercholesterolemia, unspe

cified Dhaval Miranda MD

 

                2021      E11.21          Type 2 diabetes mellitus with di

abetic nephropathy Olya Krystal [place], [facility]

 

                2021      N18.31          Chronic kidney disease, stage 3a

 Olya Krystal [place], [facility]

 

                2021      D69.6           Thrombocytopenia, unspecified An

n Krystal [place], [facility]

 

                2021      K21.9           Gastro-esophageal reflux disease

 without esophagitis Olya Krystal 

[place], [facility]

 

                2021      E78.00          Pure hypercholesterolemia, unspe

cified Olya Rice [place], [facility]

 

                2021      E55.9           Vitamin D deficiency, unspecifie

d Olya Krystal [place], [facility]

 

                2021      F41.9           Anxiety disorder, unspecified An

n Krystal [place], [facility]

 

                2021      D50.9           Iron deficiency anemia, unspecif

ied Olya Krystal [place], [facility]

 

                2021      K50.918         Crohn's disease, unspecified, wi

th other complication Olya Krystal [place], [facility]

 

                2021      N95.2           Postmenopausal atrophic vaginiti

s Olya Krystal [place], [facility]

 

                2021      E78.2           Mixed hyperlipidemia Olya Krystal [place]

, [facility]

 

                2021      Z93.2           Ileostomy status Olya Bates, [facility]

 

                2021      E11.21          Type 2 diabetes mellitus with di

abetic nephropathy Dhaval Miranda MD

 

                2021      N18.31          Chronic kidney disease, stage 3a

 Dhaval Miranda MD

 

                2021      K21.9           Gastro-esophageal reflux disease

 without esophagitis Dhaval Miranda MD

 

                2021      E78.00          Pure hypercholesterolemia, unspe

cified Dhaval Miranda MD







Plan of Treatment

Future Appointment(s):* 2021  8:00 am - NIMA Douglas at [facility]

* 2022  9:00 am - Nurse #2 at [facility]

* 2022  9:20 am - NIMA Douglas at [facility]

2021 - Olya Le [place], FNP* R07.9 Chest pain, unspecified* Comments:* non 
  anginal sign - prolonged pain without evidence of MI on EKG.She will have ma
  mmogram.





* Z23 Encounter for immunization





Functional Status





                                        Description

 

                                        No Information Available







Mental Status





                                        Description

 

                                        No Information Available







Referrals





                Refer to      Reason for Referral Status          Appt Date

 

                [facility] Hematology/Oncology NP CONSULT FOR ELEVATED PLATELETS  Tieshanathan

nt Notified 

2021

 

                                        [address] [id number]

 

                                        [phone]

## 2021-10-28 NOTE — CCD
Summarization of Episode Note

                             Created on: 10/21/2021



AMELIE ALEXYAO DURHAM

External Reference #: [id number]

: 1955

Sex: Female



Demographics





                          Address                   [address] [place]  [address]

 

                          Home Phone                [phone]

 

                          Preferred Language        Unknown

 

                          Marital Status            Unknown

 

                          Mu-ism Affiliation     Unknown

 

                          Race                      White

 

                          Ethnic Group              Not  or 





Author





                          Author                    [facility]

ems

 

                          Organization              [facility] [employer] [facility]

ems

 

                          Address                   Unknown

 

                          Phone                     Unavailable







Support





                Name            Relationship    Address         Phone

 

                    ALEX NELSON                [address] [place]  [address]                    [phone]

 

                    Amelie Lalo       ODALYS                [address] [place]  [address]                    [phone]







Care Team Providers





                    Care Team Member Name Role                Phone

 

                    Stillerman, James  Unavailable         [phone]







PROBLEMS





          Type      Condition ICD9-CM Code HQU44-DW Code Onset Dates Condition S

tatus W/U 

Status              Risk                SNOMED Code         Notes

 

       Problem Colostomy care        Z43.3         Active confirmed        57866

  







ALLERGIES





                    Allergen (clinical drug ingredient) Drug/Non Drug Allergy do

cumented on EMR 

Reaction            Allergy Type        Onset Date          Status

 

           tetracycline Tetracycline Rash       Drug Allergy            Active







ENCOUNTERS from 1955 to 2021-10-20





             Encounter    Location     Date         Provider     Diagnosis

 

                    [facility]     [address] [phone] [place] [address] 20 Oct, 

2021                      James Stillerman           







IMMUNIZATIONS

No Information



SOCIAL HISTORY

Tobacco Use:



                    Social History Observation Description         Date

 

                    Details (start date - stop date) Never Smoker         



Sex Assigned At Birth:



                          Social History Observation Description

 

                          Sex Assigned At Birth     Unknown



Education:



                    Question            Answer              Notes

 

                    Level of Education: High School          



Language:



                    Question            Answer              Notes

 

                    Languages spoken:   English              



Hinduism:



                    Question            Answer              Notes

 

                    Hinduism                                No Congregation beliefs

 that would impact health care.



Alcohol Screening:



                    Question            Answer              Notes

 

                    Did you have a drink containing alcohol in the past year? No

                   

 

                    Points              0                    

 

                    Interpretation      Negative             



Tobacco Use:



                    Question            Answer              Notes

 

                    Are you a:          never smoker         







REASON FOR REFERRAL

No Information



VITAL SIGNS

No information



MEDICATIONS





           Medication SIG (Take, Route, Frequency, Duration) Notes      Start Da

te End Date   

Status

 

                          buPROPion HCl ER (XL) 150 MG 1 tablet in the morning O

rally Once a day for 30 

day(s)                                                          Active

 

           Omeprazole 20 MG 1 capsule Orally Once a day                         

         Active

 

           Vitamin D 25 MCG (1000 UT) 2 tablet Orally Once a day                

                  Active

 

           Stelara 90 MG/ML as directed Subcutaneous every 8 weeks              

                    Active

 

           Ocuvite-Lutein - as directed Orally                                  

Active

 

           Atorvastatin Calcium 20 MG 1 tablet Orally Once a day for 30 day(s)  

                                Active

 

           Melatonin 10 MG 1 tablet in the evening Orally Once a day            

                      Active

 

           busPIRone HCl 15 MG 1 tablet Orally Twice a day                      

            Active

 

           Fish Oil 1200 MG 2 capsule Orally Once a day                         

         Active

 

                          Fluticasone Propionate 50 MCG/ACT 1 spray in each nost

ril Nasally Once a day for

30 day(s)                                                       Active

 

           metFORMIN HCl  MG 2 tablet with evening meal Orally Once a day 

                                 

Active

 

           Aspirin 81 MG 1 tablet Orally Once a day for 30 day(s)               

                   Active

 

           Lisinopril 2.5 MG 1 tablet Orally Once a day for 30 day(s)           

                       Active

 

           Fortify Probiotic Womens Ex St - as directed Orally daily            

                      Active

 

           Ferrous Sulfate 325 (65 Fe) MG 1 tablet Orally Once a day for 30 day(

s)                                  

Active

 

           Womens One Daily - 1 tablet Orally daily                             

     Active







PROCEDURES

No Information



RESULTS

No Results



REASON FOR VISIT

[facility] 



MEDICAL (GENERAL) HISTORY





                    Type                Description         Date

 

                    Medical History     PE                   

 

                    Medical History     anxiety              

 

                    Medical History     DM                   

 

                    Medical History     crohn's disease      

 

                    Medical History     HTN                  

 

                    Surgical History    Hernia               

 

                    Surgical History    Tubal ligation       

 

                    Surgical History    Tonsillectomy        

 

                    Surgical History    Cholecystectomy      

 

                    Surgical History    Colonoscopy         2009

 

                    Surgical History    Ileosotmy           

 

                    Surgical History    Cataract's           

 

                    Hospitalization History dehydration- [facility]     

 

                    Hospitalization History surgery related      







Goals Section

No Information



Health Concerns

No Information



MEDICAL EQUIPMENT

No Information



MENTAL STATUS

No Information



FUNCTIONAL STATUS

No Information



ASSESSMENTS

No Information



PLAN OF TREATMENT

Next Appt



                                        Details

 

                                        Provider Name:Jodi Meadows,  01:30:00 PM, [address], 

[phone], [place], [phone], [phone]







Insurance Providers





             Payer Name   Payer Address Payer Phone  Insured Name Patient Relati

onship to 

Insured                   Coverage Start Date       Coverage End Date

 

                [facility] FEDERAL [address] [address]  [facility] [address] [phone]

[phone]    ALEX NELSON               [id number]                 

     

 

                MEDICARE Part A and B [address]  [facility] [id number] 

[phone]    

ALEX NELSON    self

## 2021-10-28 NOTE — CCD
Summarization of Episode Note

                             Created on: 10/08/2021



ALEX NELSON

External Reference #: [id number]

: 1955

Sex: Female



Demographics





                          Address                   [address] [place]  [address]

 

                          Home Phone                [phone]

 

                          Preferred Language        Unknown

 

                          Marital Status            Unknown

 

                          Moravian Affiliation     Unknown

 

                          Race                      White

 

                          Ethnic Group              Not  or 





Author





                          Author                    [facility]

[facility]

 

                          Organization              [facility]

[facility]

 

                          Address                   Unknown

 

                          Phone                     Unavailable







Support





                Name            Relationship    Address         Phone

 

                    ALEX NELSON                [address] [place]  [address]                    [phone]

 

                    Amelie Lalo       ODALYS                [address] [place]  [address]                    [phone]







Care Team Providers





                    Care Team Member Name Role                Phone

 

                    Jodi Meadows Unavailable         [phone]







PROBLEMS





          Type      Condition ICD9-CM Code APL14-OD Code Onset Dates Condition S

tatus W/U 

Status              Risk                SNOMED Code         Notes

 

       Problem Colostomy care        Z43.3         Active confirmed        2009  







ALLERGIES





                    Allergen (clinical drug ingredient) Drug/Non Drug Allergy do

cumented on EMR 

Reaction            Allergy Type        Onset Date          Status

 

           tetracycline Tetracycline Rash       Drug Allergy            Active







ENCOUNTERS from 1955 to 2021-10-07





             Encounter    Location     Date         Provider     Diagnosis

 

                    [facility]     [phone] [place] [address] 05 Oct, 

2021                      Jodi Meadows         Colostomy care Z43.3







IMMUNIZATIONS

No Information



SOCIAL HISTORY

Tobacco Use:



                    Social History Observation Description         Date

 

                    Details (start date - stop date) Never Smoker         



Sex Assigned At Birth:



                          Social History Observation Description

 

                          Sex Assigned At Birth     Unknown



Education:



                    Question            Answer              Notes

 

                    Level of Education: High School          



Language:



                    Question            Answer              Notes

 

                    Languages spoken:   English              



Rastafarian:



                    Question            Answer              Notes

 

                    Rastafarian                                No Adventism beliefs

 that would impact health care.



Alcohol Screening:



                    Question            Answer              Notes

 

                    Did you have a drink containing alcohol in the past year? No

                   

 

                    Points              0                    

 

                    Interpretation      Negative             



Tobacco Use:



                    Question            Answer              Notes

 

                    Are you a:          never smoker         







REASON FOR REFERRAL

No Information



VITAL SIGNS





                    Weight              124 lbs             05 Oct, 2021

 

                    Height              60 in               05 Oct, 2021

 

                    BMI                 24.21 kg/m2         05 Oct, 2021

 

                    Heart Rate          102 /min            05 Oct, 2021

 

                    Respiratory Rate    17 /min             05 Oct, 2021

 

                    Temperature         98.1 degrees Fahrenheit 05 Oct, 2021

 

                    Oximetry            96                  05 Oct, 2021

 

                    Blood pressure systolic 141 mm Hg           05 Oct, 2021

 

                    Blood pressure diastolic 63 mm Hg            05 Oct, 2021







MEDICATIONS





           Medication SIG (Take, Route, Frequency, Duration) Notes      Start Da

te End Date   

Status

 

                          buPROPion HCl ER (XL) 150 MG 1 tablet in the morning O

rally Once a day for 30 

day(s)                                                          Active

 

           Omeprazole 20 MG 1 capsule Orally Once a day                         

         Active

 

           Vitamin D 25 MCG (1000 UT) 2 tablet Orally Once a day                

                  Active

 

           Stelara 90 MG/ML as directed Subcutaneous every 8 weeks              

                    Active

 

           Ocuvite-Lutein - as directed Orally                                  

Active

 

           Atorvastatin Calcium 20 MG 1 tablet Orally Once a day for 30 day(s)  

                                Active

 

           Melatonin 10 MG 1 tablet in the evening Orally Once a day            

                      Active

 

           busPIRone HCl 15 MG 1 tablet Orally Twice a day                      

            Active

 

           Fish Oil 1200 MG 2 capsule Orally Once a day                         

         Active

 

                          Fluticasone Propionate 50 MCG/ACT 1 spray in each nost

ril Nasally Once a day for

30 day(s)                                                       Active

 

           metFORMIN HCl  MG 2 tablet with evening meal Orally Once a day 

                                 

Active

 

           Aspirin 81 MG 1 tablet Orally Once a day for 30 day(s)               

                   Active

 

           Lisinopril 2.5 MG 1 tablet Orally Once a day for 30 day(s)           

                       Active

 

           Fortify Probiotic Womens Ex St - as directed Orally daily            

                      Active

 

           Ferrous Sulfate 325 (65 Fe) MG 1 tablet Orally Once a day for 30 day(

s)                                  

Active

 

           Womens One Daily - 1 tablet Orally daily                             

     Active







PROCEDURES

No Information



RESULTS

No Results



REASON FOR VISIT

Ileostomy care due to leakage



MEDICAL (GENERAL) HISTORY





                    Type                Description         Date

 

                    Medical History     PE                   

 

                    Medical History     anxiety              

 

                    Medical History     DM                   

 

                    Medical History     crohn's disease      

 

                    Medical History     HTN                  

 

                    Surgical History    Hernia               

 

                    Surgical History    Tubal ligation       

 

                    Surgical History    Tonsillectomy        

 

                    Surgical History    Cholecystectomy      

 

                    Surgical History    Colonoscopy         2009

 

                    Surgical History    Ileosotmy           

 

                    Surgical History    Cataract's           

 

                    Hospitalization History dehydration- [facility]     

 

                    Hospitalization History surgery related      







Goals Section

No Information



Health Concerns

No Information



MEDICAL EQUIPMENT

No Information



MENTAL STATUS

No Information



FUNCTIONAL STATUS

No Information



ASSESSMENTS





             Encounter Date Diagnosis    Assessment Notes Treatment Notes Treatm

ent Clinical 

Notes

 

                05 Oct, 2021    Colostomy care (ICD-10 - Z43.3)                 



Dressing changes twice a week. The dressing should follow those documented in 
the procedure note.



We have changed your dressings and will monitor how this helps. We will have the
patient return in 2 weeks               



Coloplast education given on Life after Ileostomy, addressing lifestyle and 
dietary concerns







PLAN OF TREATMENT

Treatment Notes



                    Assessment          Notes               Clinical Notes

 

                          Colostomy care            Dressing changes twice a wee

k. The dressing should follow those 

documented in the procedure note.We have changed your dressings and will monitor
how this helps. We will have the patient return in 2 weeks Coloplast education 

given on Life after Ileostomy, addressing lifestyle and dietary concerns



Next Appt



                                        Details

 

                                        1 Week Reason:

 

                                        Provider Name:Jodi Meadows, 2021-

10-12 01:00:00 PM, [address], 

[phone], [place], [address], [phone]







Insurance Providers





             Payer Name   Payer Address Payer Phone  Insured Name Patient Relati

onship to 

Insured                   Coverage Start Date       Coverage End Date

 

                MEDICARE Part A and B [address]  [facility] [phone] 

[phone]    

ALEX NELSON    self                                     

 

                [facility] [address] [address]  [facility] [address] [phone]

[phone]    ALEX NELSON               [id number]

## 2021-10-28 NOTE — CCD
Continuity of Care Document (CCD)

                             Created on: 10/05/2021



Vanesa Kinsey

External Reference #: [id number]

: 1955

Sex: Female



Demographics





                          Address                   [address] [place]  [address]

 

                          Home Phone                [phone]

 

                          Preferred Language        Unknown

 

                          Marital Status            Unknown

 

                          Orthodox Affiliation     Unknown

 

                          Race                      White

 

                          Ethnic Group              Not  or 





Author





                          Author                    Vanesa Douglas

 

                          Organization              Unknown

 

                          Address                   [address]  [address]



 

                          Phone                     [phone]







Care Team Providers





                    Care Team Member Name Role                Phone

 

                    Jessica Cervantes MD    AUTM                [phone]

 

                    Olya Bangura ANP   AUTM                [phone]

 

                    Israel Farmer MD AUTM                [phone]







Problems





                    Active Problems     Provider            Date

 

                    Pulmonary embolism  NIMA Douglas    Onset: 04/10/2012

 

                    Gastroesophageal reflux disease LUCRETIA Ring Onset

: 04/10/2012

 

                    Pure hypercholesterolemia LUCRETIA Ring Onset: 

 

                    Type 2 diabetes mellitus LUCRETIA Ring Onset: 04/10

/2012

 

                    Anemia              Marc Rodriguez D.O. Onset: 04/10/

2012







Social History





                Type            Date            Description     Comments

 

                Birth Sex                       Unknown          

 

                ETOH Use                        Occasionally consumes beer  

 

                Tobacco Use     Start: Unknown End: Unknown Patient is a former 

smoker SMOKED FOR 

15YRS 1 MAGGY A DAY quit age 37 







Allergies and adverse reactions





             Active Allergies Criticality  Reaction | Severity Comments     Date

 

             Tetracycline Unable to assess criticality              rash        

 2010







Medications





           Active Medications SIG        Qnty       Indications Ordering Provide

r Date

 

                          Alprazolam                     0.25mg Tablets         

          1/2 - one tablet

twice daily as needed anxiety 10tabs                          NIMA Douglas 0

2021

 

                          Zolpidem Tartrate                     5mg Tablets     

              take one 

tablet by mouth at bedtime as needed for sleep maximum daily dose = 1 tablet mdd
1               7tabs                           NIMA Douglas 06/10/2021

 

                          Estrace                     0.1mg/GM Cream            

       1/4 applicatorful 

vaginally at bedtime daily x 14 days then three times a week 42.500gm           

                     Olya [facility]                               2021

 

                    Lutein                     20mg Capsules                   1

 by mouth every day 

                                        Olya Bates [facility]    2021

 

                          Lisinopril                     2.5mg Tablets          

         1 by mouth every 

day             90tabs          E11.21          Olya Bates [facility] 2020

 

           Calcium + D 1200MG/D                         Josephine Escalante M.D. 

2019

 

                                        Bupropion Hydrochloride ER (XL)         

            150mg Tablets ER 24HR       

             take 1 tablet by mouth once daily 90tabs                    Olya [facility] 

10/15/2019

 

                                        Fluticasone Propionate                  

   50mcg/Act Suspension                 

             2 sprays each nostril daily X 2 Weeks Then prn 16gm                

      [facility] 

2019

 

                                        Fortify Probiotic Womens Extra Strength 

                     Capsules Cait Walls,[facility] 20

18

 

                          Buspirone HCL                     15mg Tablets        

           Take 1 Tablet 

By Mouth Twice Daily 180tabs                         Olya Bates [facility] 2018

 

                          Vitamin D                     1000Unit Tablets        

           2 by mouth 

every day                                       Emily Aleman, [facility] 

8

 

                          Onetouch Ultra Blue                      Strips       

            use twice 

daily to check blood sugar dx e11.9 200units                        [facility] 10/11/2017

 

             Melatonin                     10mg Tablets                   1 hs p

o                                [facility]                             2016

 

                          Metformin HCL ER                     500mg Tablets ER 

24HR                   

take 2 tablets by mouth once daily with the largest meal of the day 180tabs     

                            

Olya [facility]                        2016

 

                          Aspirin Childrens                     81mg Chewtabs   

                1 by mouth

every hs                                        [facility] 2016

 

                          Loperamide HCL                     2mg Capsules       

            4 capsules at 

bedtime by mouth may take additional 4 capsules in daytime - not to exceed 16mg 
daily           240caps                         Olya Bates [facility] 2016

 

                          Ferrous Sulfate                     325(65Fe) mg Table

ts DR                   1 

by mouth every day                                 [facility] 

6

 

                          Womens One Daily                      Tablets         

          1 by mouth every

day                                             Emily Aleman, [facility] 

6

 

                          Atorvastatin Calcium                     20mg Tablets 

                  Take 1 

Tablet By Mouth Every Day 90tabs                          Olya [facility] 

 

                          Omeprazole                     20mg Capsules DR       

            take 1 capsule

by mouth twice daily. maximum daily dose is 2 180caps                         An

n Krystal Marley, [facility] 

2010

 

             Similasan Earache Relief                      Solution             

                                             

Unknown                                 

 

           Omega-3 Drops For Eyes                                  Unknown    

 

                          Budesonide                     3mg Caps DR Part       

            3 tabs daily 

before dinner for four weeks, then 2 tabs daily for four weeks, then 1 tab daily
for remaining four weeks- Dr. Farmer's                                 Unknow

n         

 

                Stelara                     90mg/ml Soln Prefill Syringe        

                                            

                          Unknown                   







Medications Administered in Office





           Medication SIG        Qnty       Indications Ordering Provider Date

 

                          Administration Of Flu Vaccine                      Inj

ection                    

                                                Olya Krystal Marley, P 2021

 

                          Covid-19 vaccine, Unspecified                      Inj

ection                    

                                                Unknown         2021

 

                          Covid-19 vaccine, Unspecified                      Inj

ection                    

                                                Unknown         2021

 

                                        Immunization Adminstration,1 Vaccine/Tox

oid                      Injection      

                                                    Cait Henson,[facility] 20

20







Immunizations





           CPT Code   Status     Date       Vaccine    Reaction   Lot #

 

                [id number]           Given           2021      Influenza Vaccin

e Quadrivalent Preser/Antibiotic Free Im 

Use                                                 [id number]

 

           [id number]      Given      2020 Pneumovax 23            [id number]

 

           96555      Given      2020 Adacel- Tetanus Diphtheria Pertussis

            [id number]

 

           U-Flu      Given      2019 Influenza,Unspecified             

 

           U-PneuC    Given      07/15/2019 Prevnar 13             

 

                91663           Given           07/15/2019      Shingrix Zoster 

Vaccine (HZV), Recombinant, Subunit, 

Adjuvanted                                           

 

           [id number]      Given      05/15/2019 Shingrix               

 

           U-Flu      Given      10/11/2018 Influenza,Unspecified             

 

           [id number]      Given      2017 PPD        0 mm read by Lashaun ortega, SIRENA [id number]

 

                 Given      2016 Fluvirin Virus Vaccine            16

[id number]

 

           76433      Given      10/10/2003 Pneumovax 23             







Vital Signs





                Date            Vital           Result          Comment

 

                2021  2:09pm BP Systolic     112 mmHg         

 

                    BP Diastolic        70 mmHg              

 

                    Heart Rate          98 /min              

 

                    Height              61.25 inches        5'1.25"

 

                    Weight              122.00 lb            

 

                    O2 % BldC Oximetry  97 %                 

 

                    BMI (Body Mass Index) 22.9 kg/m2           

 

                2021 10:32am BP Systolic     130 mmHg         

 

                    BP Diastolic        64 mmHg              

 

                    Heart Rate          88 /min              

 

                    Height              61.25 inches        5'1.25"

 

                    Weight              125.00 lb            

 

                    O2 % BldC Oximetry  96 %                 

 

                    BMI (Body Mass Index) 23.4 kg/m2           







Results





        Test    Acquired Date Facility Test    Result  H/L     Range   Note

 

                    Laboratory test finding 2021          [address] [address] [phone] Erythrocyte Sedimentation Rate 39 mm/hr   High       0

-30        

 

                    Liver Profile       2021          [facility]

nter

                                        [address] [place] [address] [phone] Ast/Sgot   20 U/L     Normal     7-37        

 

             Alt/SGPT     30 U/L       Normal       12-78         

 

             Alkaline Phosphatase 95 U/L       Normal               

 

             Bilirubin,Total 0.4 mg/dL    Normal       0.2-1.0       

 

             Bilirubin,Direct 0.1 mg/dL    Normal       0.0-0.2       

 

             Total Protein 6.9 GM/DL    Normal       6.4-8.2       

 

             Albumin      3.3 GM/DL    Normal       3.2-5.2       

 

             Albumin/Globulin Ratio 0.9          Low          1.2-2.2       

 

                    Basic Metabolic Profile 2021          [address] [address] [phone] Glucose, Fasting 126 mg/dL  High             

 

             Blood Urea Nitrogen 13 mg/dL     Normal       7-18          

 

             Creatinine For GFR 0.84 mg/dL   Normal       0.55-1.30     

 

             Glomerular Filtration Rate > 60.0       Normal       >45          1

 

             Sodium Level 140 mEq/L    Normal       136-145       

 

             Potassium Serum 4.4 mEq/L    Normal       3.5-5.1       

 

             Chloride Level 106 mEq/L    Normal               

 

             Carbon Dioxide Level 28 mEq/L     Normal       21-32         

 

             Anion Gap    6 mEq/L      Low          8-16          

 

             Calcium Level 9.6 mg/dL    Normal       8.8-10.2      

 

                    Total Iron Binding Capacit 2021          [address] [address] [phone] Iron (Fe)  33 g/dL  Low              

 

             Total Iron Binding Capacity 424 g/dL   Normal       250-450      

 

 

             Percent Saturation 7.8 %        Low          13.2-45.0     

 

                    Vitamin B12 & Folate 2021          [facility]

enter

                                        [address] [place] [address] [phone] Vitamin B12 Level 598 pg/mL  Normal                2

 

             Folate       > 24.0 NG/ML Normal                    3

 

                    Laboratory test finding 2021          [facility]

                                        [address] [place], [place] [id number] [phone] C Reactive Protein Quantitativ 0.64 mg/dL High       0

.00-0.30   

 

                    Fat Fecal Qualitative 2021          [address] [address] [phone] Fats Neutral Normal     Normal     .          4

 

             Fats Total   Normal       Normal       .            5

 

                    Laboratory test finding 2021          [address] [address] [phone] Pancreatic Elastase Stool TNP        Normal           

     6

 

             Calprotectin Stool 40 ug/g      Normal       0-120        7

 

                    CBC With Differential 2021          [address] [address] [phone] White Blood Count 10.4 10    High       4.0-10.0    

 

             Red Blood Count 4.13 10      Normal       4.00-5.40     

 

             Hemoglobin   11.9 g/dL    Low          12.0-15.5     

 

             Hematocrit   36.9 %       Normal       36.0-47.0     

 

             Mean Corpuscular Volume 89.3 fl      Normal       80.0-96.0     

 

             Mean Corpuscular Hemoglobin 28.8 pg      Normal       27.0-33.0    

 

 

             Mean Corpuscular HGB Conc 32.2 g/dL    Normal       32.0-36.5     

 

             Red Cell Distribution Width 19.7 %       High         11.5-14.5    

 

 

             Platelet Count, Automated 454 10       High         150-450       

 

             Neutrophils % 68.2 %       High         36.0-66.0     

 

             Lymph %      21.9 %       Low          24.0-44.0     

 

             Mono %       8.1 %        High         2.0-8.0       

 

             Eos %        0.7 %        Normal       0.0-3.0       

 

             Baso %       0.6 %        Normal       0.0-1.0       

 

             Immature Granulocyte % 0.5 %        Normal       0-3.0         

 

             Nucleated Red Blood Cell % 0.0 %        Normal       0-0           

 

             Neutrophils # 7.1 10       Normal       1.5-8.5       

 

             Lymph #      2.3 10       Normal       1.5-5.0       

 

             Mono #       0.9 10       High         0.0-0.8       

 

             Eos #        0.1 10       Normal       0.0-0.5       

 

             Baso #       0.1 10       Normal       0.0-0.2       

 

                    Comprehensive Metabolic Profil 2021          [address] [address] [phone] Glucose, Fasting 121 mg/dL  High             

 

             Blood Urea Nitrogen 27 mg/dL     High         7-18          

 

             Creatinine For GFR 1.00 mg/dL   Normal       0.55-1.30     

 

             Glomerular Filtration Rate 58.9         Normal       >45          8

 

             Sodium Level 137 mEq/L    Normal       136-145       

 

             Potassium Serum 4.2 mEq/L    Normal       3.5-5.1       

 

             Chloride Level 106 mEq/L    Normal               

 

             Carbon Dioxide Level 25 mEq/L     Normal       21-32         

 

             Anion Gap    6 mEq/L      Low          8-16          

 

             Calcium Level 8.9 mg/dL    Normal       8.8-10.2      

 

             Ast/Sgot     26 U/L       Normal       7-37          

 

             Alt/SGPT     40 U/L       Normal       12-78         

 

             Alkaline Phosphatase 79 U/L       Normal               

 

             Bilirubin,Total 0.5 mg/dL    Normal       0.2-1.0       

 

             Total Protein 7.5 GM/DL    Normal       6.4-8.2       

 

             Albumin      3.4 GM/DL    Normal       3.2-5.2       

 

             Albumin/Globulin Ratio 0.8          Low          1.2-2.2       

 

                    Laboratory test finding 2021          [address] [address] [phone]  Jak2 Mutations For Path Sendou See Pathology Re 

<SEE NOTE>  

Normal                                              9

 

                    BCR/Abl Screen For CML Path 2021          [address] [address] [phone]  BCR/Abl Screen For CML Path So See Pathology Re 

<SEE NOTE>  

Normal                                              10

 

                    Laboratory test finding 2021          [address] [address] [phone] Vitamin B12 Level 580 pg/mL  Normal     247-911    11

 

             Ferritin     19 NG/ML     Normal       8-252         

 

                    Total Iron Binding Capacit 2021          [address] [address] [phone] Iron (Fe)  139 g/dL Normal           

 

             Total Iron Binding Capacity 494 g/dL   High         250-450      

 

 

             Percent Saturation 28.1 %       Normal       13.2-45.0     

 

                    CBC With Differential 2021          [facility]

                                        [address] [place] [address] [phone] White Blood Count 15.0 10    High       4.0-10.0    

 

             Red Blood Count 4.63 10      Normal       4.00-5.40     

 

             Hemoglobin   12.9 g/dL    Normal       12.0-15.5     

 

             Hematocrit   39.2 %       Normal       36.0-47.0     

 

             Mean Corpuscular Volume 84.7 fl      Normal       80.0-96.0     

 

             Mean Corpuscular Hemoglobin 27.9 pg      Normal       27.0-33.0    

 

 

             Mean Corpuscular HGB Conc 32.9 g/dL    Normal       32.0-36.5     

 

             Red Cell Distribution Width 19.1 %       High         11.5-14.5    

 

 

             Platelet Count, Automated 319 10       Normal       150-450       

 

             Neutrophils % 92.5 %       High         36.0-66.0     

 

             Lymph %      5.4 %        Low          24.0-44.0     

 

             Mono %       0.7 %        Low          2.0-8.0       

 

             Eos %        0.0 %        Normal       0.0-3.0       

 

             Baso %       0.1 %        Normal       0.0-1.0       

 

             Immature Granulocyte % 1.3 %        Normal       0-3.0         

 

             Nucleated Red Blood Cell % 0.0 %        Normal       0-0           

 

             Neutrophils # 13.9 10      High         1.5-8.5       

 

             Lymph #      0.8 10       Low          1.5-5.0       

 

             Mono #       0.1 10       Normal       0.0-0.8       

 

             Eos #        0.0 10       Normal       0.0-0.5       

 

             Baso #       0.0 10       Normal       0.0-0.2       

 

                    Complete Blood Count 2021          [facility]

s, pc

: Dr Dhaval Miranda

[place] [address]

           [phone] WBC        9.2 x10*3/UL            4.1 - 10.9 12

 

             RBC          4.37 x10*6/UL              4.20 - 6.30   

 

             Hemoglobin   12.3 g/dL                 12.0 - 18.0   

 

             Hematocrit   36.6 %       Low          37.0 - 51.0   

 

             MCV          83.6 fL                   80.0 - 97.0   

 

             MCH          28.2 pg                   26.0 - 32.0   

 

             MCHC         33.7 g/dL                 31.0 - 38.0   

 

             RDW          15.5 %       High         11.6 - 13.7   

 

             PLT          506 x10*3/UL High         140 - 440     

 

             MPV          7.1 FL       Low          7.8 - 11.0    

 

             Lymph %      19.4 %                    10.0 - 58.5   

 

             Mid %        5.4 %                     1.7 - 9.3     

 

             Neut %       75.2 %                    37.0 - 92.0   

 

             Lymph #      1.7 x10*3/UL              0.6 - 4.1     

 

             Mid #        0.6 x10*3/UL              0.1 - 0.6     

 

             Neut #       6.9 x10*3/UL              2.0 - 7.8     

 

                    A1c                 2021          [facility]

, 

: Dr Dhaval Miranda

[place], [place] [id number] [phone] Hba1c      6.0 %      High       <5.7       13

 

             Est Avg Glucose 125 mg/dL    High         60 - 110      

 

                    Comprehensive Chem Profile 2021          [facility]

[facility] 

: Dr Dhaval Miranda

[place] [address] [phone] Glucose    94 mg/dL              74 - 99    14

 

             BUN          19 mg/dL     High         7 - 18        

 

             Creatinine   1.0 mg/dL                 0.6 - 1.3     

 

             Sodium       142 mEq/L                 136 - 145     

 

             Potassium    4.1 mEq/L                 3.5 - 5.1     

 

             Chloride     104 mEq/L                 98 - 107      

 

             Carbon Dioxide 30 mEq/L                  21 - 32       

 

             Calcium      9.0 mg/dL                 8.5 - 10.1    

 

             Alk. Phosphatase 112 mg/dL                 46 - 116      

 

             Total Bilirubin 0.3 mg/dL                 0.2 - 1.0     

 

             Ast (Sgot)   17 U/L                    15 - 37       

 

             Alt (SGPT)   23 U/L                    12 - 78       

 

             Albumin      3.0 g/dL     Low          3.4 - 5.0     

 

             Total Protein 7.4 g/dL                  6.4 - 8.2     

 

             A/G Ratio    0.68 CALC    Low          1.00 - 1.90   

 

             GFR  55 mL/min    Low          >60           

 

             GFR African American >= 60 mL/min              >60          15

 

                    Lipid Profile       2021          [facility]

, 

: [place] [address]

           [phone] Cholesterol 143 mg/dL             131 - 200   

 

             Triglycerides 105 mg/dL                 30 - 150      

 

             HDL Cholesterol 89 mg/dL     High         35 - 60       

 

             LDL (Calculated) 33 CALC      Low          50 - 159      

 

                    Laboratory test finding 2021          [facility]

[place]

: [place] [address]

           [phone] Magnesium  1.9 mg/dL             1.8 - 2.4   







                          1                         Units are mL/min/1.73 m2



Chronic Kidney Disease Staging per NKF:



Stage I & II   GFR >=60       Normal to Mildly Decreased

Stage III      GFR 30-59      Moderately Decreased

Stage IV       GFR 15-29      Severely Decreased

Stage V        GFR <15        Very Little GFR Left

ESRD           GFR <15 on RRT



 

                          2                         VITAMIN B12 NORMAL RANGE



NORMAL                     247 - 911 PG/ML

INDETERMINATE              211 - 246 PG/ML

DEFICIENT              LESS THAN 211 PG/ML



 

                          3                         FOLATE NORMAL RANGE



NORMAL             GREATER THAN 5.4 NG/ML

INDETERMINATE               3.4-5.4 NG/ML

DEFICIENT             LESS THAN 3.4 NG/ML



 

                          4                         Normal (<60 Droplets/HPF)



 

                          5                         Normal (<100 Droplets/HPF)



 

                          6                         Test not performed. Consiste

ncy of stool specimen too watery

for analysis.

TEST: [id number] Pancreatic Elastase, Fecal



Testing performed at reference lab . Report copy to follow

on a separate form. 21 REF LAB#:[id number]



 

                          7                         Concentration     Interpreta

tion   Follow-Up

<16 - 50 ug/g     Normal           None

>50 -120 ug/g     Borderline       Re-evaluate in 4-6 weeks

>120 ug/g     Abnormal         Repeat as clinically

indicated



 

                          8                         Units are mL/min/1.73 m2



Chronic Kidney Disease Staging per NKF:



Stage I & II   GFR >=60       Normal to Mildly Decreased

Stage III      GFR 30-59      Moderately Decreased

Stage IV       GFR 15-29      Severely Decreased

Stage V        GFR <15        Very Little GFR Left

ESRD           GFR <15 on RRT



 

                          9                         See Pathology Report

Specimen Collection for Pathology Reference Lab Testing.

Refer to [facility] Pathology Report for Results: [id number]



 

                          10                        See Pathology Report

Specimen Collection for Pathology Reference Lab Testing.

Refer to [facility] Pathology Report for Results:[id number]



 

                          11                        VITAMIN B12 NORMAL RANGE



NORMAL                     247 - 911 PG/ML

INDETERMINATE              211 - 246 PG/ML

DEFICIENT              LESS THAN 211 PG/ML



 

                          12                        NOTE:

CBC VERIFIED







 

                          13                        Lab Result Notes:

Pre-Diabetes   5.7 - 6.4 %

Diabetes           = or > 6.5%



 

                          14                        100-125 mg/dL     PRE-DIABET

ES/FASTING

>126 mg/dL          DIABETES/FASTING



 

                          15                        CHRONIC KIDNEY DISEASE STAGI

NG PER NKF



STAGE I & II      GFR >= 60        NORMAL TO MILDLY DECREASED

STAGE III          GFR 30-59          MODERATELY DECREASED

STAGE IV           GFR 15-29         SEVERELY DECREASED

STAGE V            GFR <15            VERY LITTLE GFR LEFT

ESRD                 GFR <15            ON RRT









Procedures





                Date            Code            Description     Status

 

                2021      87890           EKG/Interpretation & Report Comp

leted

 

                2021      82126           Office/Outpatient Established Lo

w MDM 20-29 Min Completed

 

                2021      79131           Office/Outpatient Established Lo

w MDM 20-29 Min Completed

 

                    2021          52038               Chronic Care MGMT 20

 Mins Clinical Staff Time Per Calendar 

Month                                   Completed

 

                    2021          97800               Chronic Care MGMT 20

 Mins Clinical Staff Time Per Calendar 

Month                                   Completed

 

                2021      02542           Chronic Care Management Services

 Ea Addl 20 Min Completed

 

                2021      50373           Office/Outpatient Established Mo

d MDM 30-39 Min Completed

 

                    2021          82325               Chronic Care MGMT 20

 Mins Clinical Staff Time Per Calendar 

Month                                   Completed

 

                2021      [id number]       Diabetic Retinal Eye Exam [facility]

 

                2020      [id number]       Bone Mineral Density Test [facility]

 

                2020      22140785        Mammogram       Completed

 

                2019      [id number]       Diabetic Retinal Eye Exam Comple

[facility]

 

                2018      [id number]       Bone Mineral Density Test [facility]

 

                2018      [id number]        Mammogram       Completed

 

                2016      [id number]        Mammogram       Completed

 

                2016      [id number]        Mammogram       Completed

 

                2014      [id number]        Mammogram       Completed

 

                2011      58576017        Mammogram       Completed

 

                2011      [id number]       Bone Mineral Density Test Comple

[facility]

 

                2010      [id number]       Diabetic Foot Exam Completed

 

                2010      [id number]        Mammogram       Completed

 

                10/22/2008      [id number]       Bone Mineral Density Test [facility]

 

                2006      [id number]        Mammogram       Completed







Medical Devices





                                        Description

 

                                        No Information Available







Encounters





           Type       Date       Location   Provider   Dx         Diagnosis

 

             Office Visit 2021  2:00p [facility] Olya Daigle

ne, FNP R07.9

                                        Chest pain, unspecified

 

                          Z23                       Encounter for immunization

 

             Office Visit 2021 10:20a [facility] Olya Hutchins, [facility] 

E11.21                                  Type 2 diabetes mellitus with diabetic n

ephropathy

 

                          N18.31                    Chronic kidney disease, stag

e 3a

 

                          K21.9                     Gastro-esophageal reflux dis

ease without esophagitis

 

                          E55.9                     Vitamin D deficiency, unspec

ified

 

                          F41.9                     Anxiety disorder, unspecifie

d

 

                          D50.9                     Iron deficiency anemia, unsp

ecified

 

                          K50.918                   Crohn's disease, unspecified

, with other complication

 

                          Z93.2                     Ileostomy status

 

                          M85.80                    Oth disrd of bone density an

d structure, unspecified site

 

                          E78.1                     Pure hyperglyceridemia

 

             Office Visit 2021  9:00a [facility] Olya Daigle

ne, [facility] 

E11.21                                  Type 2 diabetes mellitus with diabetic n

ephropathy

 

                          N18.31                    Chronic kidney disease, stag

e 3a

 

                          D69.6                     Thrombocytopenia, unspecifie

d

 

                          K21.9                     Gastro-esophageal reflux dis

ease without esophagitis

 

                          E78.00                    Pure hypercholesterolemia, u

nspecified

 

                          E55.9                     Vitamin D deficiency, unspec

ified

 

                          F41.9                     Anxiety disorder, unspecifie

d

 

                          D50.9                     Iron deficiency anemia, unsp

ecified

 

                          K50.918                   Crohn's disease, unspecified

, with other complication

 

                          N95.2                     Postmenopausal atrophic vagi

nitis

 

                          E78.2                     Mixed hyperlipidemia

 

                          Z93.2                     Ileostomy status







Assessments





                Date            Code            Description     Provider

 

                2021      R07.9           Chest pain, unspecified Olya Guillen [facility]

 

                2021      Z23             Encounter for immunization Olya Marley, [facility]

 

                2021      E11.21          Type 2 diabetes mellitus with di

abetic nephropathy Olya Bates [facility]

 

                2021      N18.31          Chronic kidney disease, stage 3a

 Olya Bates [facility]

 

                2021      K21.9           Gastro-esophageal reflux disease

 without esophagitis Olya Bates [facility]

 

                2021      E55.9           Vitamin D deficiency, unspecifie

d Olya Bates [facility]

 

                2021      F41.9           Anxiety disorder, unspecified An

n Krystal Marley [facility]

 

                2021      D50.9           Iron deficiency anemia, unspecif

ied Olya Bates, [facility]

 

                2021      K50.918         Crohn's disease, unspecified, wi

th other complication Olya Bates, [facility]

 

                2021      Z93.2           Ileostomy status Olya Bates, [facility]

 

                2021      M85.80          Other specified disorders of bon

e density and structure, uns 

Olya Bates, [facility]

 

                2021      E78.1           Pure hyperglyceridemia Olya Hutchins, [facility]

 

                2021      E11.9           Type 2 diabetes mellitus without

 complications Dhaval Miranda MD

 

                2021      K21.9           Gastro-esophageal reflux disease

 without esophagitis Dhaval Miranda MD

 

                2021      E55.9           Vitamin D deficiency, unspecifie

d Dhaval Miranda MD

 

                2021      N18.31          Chronic kidney disease, stage 3a

 Dhaval Miranda MD

 

                2021      K21.9           Gastro-esophageal reflux disease

 without esophagitis Dhaval Miranda MD

 

                2021      E78.00          Pure hypercholesterolemia, unspe

cified Dhaval Miranda MD

 

                2021      E11.21          Type 2 diabetes mellitus with di

abetic nephropathy Olya Bates, [facility]

 

                2021      N18.31          Chronic kidney disease, stage 3a

 Olya Bates, [facility]

 

                2021      D69.6           Thrombocytopenia, unspecified An

n Krystal Donell, [facility]

 

                2021      K21.9           Gastro-esophageal reflux disease

 without esophagitis Olya Krystal 

[place], [facility]

 

                2021      E78.00          Pure hypercholesterolemia, unspe

cified Olya Krystal [place], [facility]

 

                2021      E55.9           Vitamin D deficiency, unspecifie

d Olya Rice [place], [facility]

 

                2021      F41.9           Anxiety disorder, unspecified An

n Krystal Marley, [facility]

 

                2021      D50.9           Iron deficiency anemia, unspecif

ied Olya Bates, [facility]

 

                2021      K50.918         Crohn's disease, unspecified, wi

th other complication Olya Krystal Marley, [facility]

 

                2021      N95.2           Postmenopausal atrophic vaginiti

s Olya Krystal Marley, [facility]

 

                2021      E78.2           Mixed hyperlipidemia [facility]

 

                2021      Z93.2           Ileostomy status [facility]

 

                2021      E11.21          Type 2 diabetes mellitus with di

abetic nephropathy Dhaval Miranda MD

 

                2021      N18.31          Chronic kidney disease, stage 3a

 Dhaval Miranda MD

 

                2021      K21.9           Gastro-esophageal reflux disease

 without esophagitis Dhaval Miradna MD

 

                2021      E78.00          Pure hypercholesterolemia, unspe

cified Dhaval Miranda MD







Plan of Treatment

Future Appointment(s):* 2021  8:00 am - NIM[facility] Douglas at [facility]

* 2022  9:00 am - Nurse #2 at [facility]

* 2022  9:20 am - [facility] Douglas at [facility]

2021 - [facility]* R07.9 Chest pain, unspecified* Comments:* non 
  anginal sign - prolonged pain without evidence of MI on EKG.She will have ma
  mmogram.





* Z23 Encounter for immunization





Functional Status





                                        Description

 

                                        No Information Available







Mental Status





                                        Description

 

                                        No Information Available







Referrals





                Refer to      Reason for Referral Status          Appt Date

 

                [facility] Hematology/Oncology NP CONSULT FOR ELEVATED PLATELETS  T[place]

nt Notified 

2021

 

                                        [address] [place]  [address]

 

                                        [phone]

## 2021-10-28 NOTE — CCD
Continuity of Care Document (CCD)

                             Created on: 2021



Vanesa Kinsey

External Reference #: [id number]

: 1955

Sex: Female



Demographics





                          Address                   [address] [place] [address].

[place]  [address]

 

                          Home Phone                [phone]

 

                          Preferred Language        Unknown

 

                          Marital Status            Unknown

 

                          Yazidi Affiliation     Unknown

 

                          Race                      White

 

                          Ethnic Group              Declined to Specify/Unknown





Author





                          Author                    Vanesa PALOMO M.D.

 

                          Organization              Unknown

 

                          Address                   [address], [address]

[place]  [address]



 

                          Phone                     [phone]







Care Team Providers





                    Care Team Member Name Role                Phone

 

                    Josephine Traore M.D. AUTM                [phone]

 

                    Olya Bangura  AUTM                [phone]

 

                    Stillerman, James   AUTM                [phone]







Problems





                                        Description

 

                                        No Information Available







Social History





                Type            Date            Description     Comments

 

                Birth Sex                       Unknown          

 

                ETOH Use                        2 A Week         

 

                Tobacco Use     Start: Unknown  Non Smoker       







Allergies, Adverse Reactions, Alerts





             Active Allergies Criticality  Reaction | Severity Comments     Date

 

             Tetracycline Unable to assess criticality                          

 2021







Medications





           Active Medications SIG        Qnty       Indications Ordering Provide

r Date

 

                          Stelara                     90mg/ml Soln Prefill Syrin

ge                   90 mg

dose, subcutaneous injection every 8 weeks ( to be started 8 weeks after the iv 
induction dose). 2ml             K50.812         Israel Palomo M.D. 

 

           Fish Oil 1400MG Softgel 2caps po qd                       Unknown    



 

           Jay 3 Eye Drops instill bid                       Unknown    0

000

 

             Ocuvite-Lutein                      Capsules                   1cap

 po qd                             

Unknown                                 

 

                          Omeprazole                     20mg Capsules DR       

            1cap by mouth 

twice a day     60caps                          Israel Palomo M.D. 

 

             Womens One Daily                      Tablets                   1ta

b po qd                             

Unknown                                 

 

                          Vitamin D                     25mcg (1000 Ut) Tablets 

                  2tabs po

qd                                              Unknown         

 

             Aspirin 81                     81mg Tablets DR                   1t

ab po qd                             

Unknown                                 

 

                          Ferrous Sulfate                     325(65Fe) mg Table

ts DR                   

1tab po qd                                      Unknown         

 

             Melatonin                     10mg Capsules                   1cap 

po qhs                            

Unknown                                 

 

                                        Fortify Probiotic Womens Extra Strength 

                     Capsules DR        

             1cap po qd                             Unknown      

 

           Calcium + D 1200MG/D 1tab po qd                       Unknown    

 

                          Atorvastatin Calcium                     20mg Tablets 

                  1tab po 

qd                                              Unknown         

 

                          Metformin HCL                     500mg Tablets       

            2tabs (1000mg)

po qd                                           Unknown         

 

                Buspirone HCL                     15mg Tablets                  

 1tab po bid                      

                          Unknown                   

 

                                        Bupropion Hydrochloride ER (XL)         

            150mg Tablets ER 24HR       

             1tab po qd                             Unknown      

 

             Lisinopril                     2.5mg Tablets                   1tab

 po qd                             

Unknown                                 

 

                                        History Medications

 

                          Prednisone                     10mg Tablets           

        take 40 mg dose 

daily for 20 days, then 20 mg dose for 5 days and then 10 mg dose for 5 days and
then 5 mg dose for 6 days. 98tabs          K50.812         Israel Palomo M.D. 2021 -

2021







Immunizations





                                        Description

 

                                        No Information Available







Vital Signs





                Date            Vital           Result          Comment

 

                2021 12:23pm BP Systolic     112 mmHg         

 

                    BP Diastolic        64 mmHg              

 

                    Height              60.5 inches         5'0.50"

 

                    Weight              124.00 lb            

 

                    BMI (Body Mass Index) 23.8 kg/m2           

 

                    Ideal Body Weight   100 lb               

 

                    Weight              56.246 kg            

 

                    BSA (Body Surface Area) 1.53 m2              

 

                2021 12:11pm BP Systolic     126 mmHg         

 

                    BP Diastolic        62 mmHg              

 

                    Height              60.5 inches         5'0.50"

 

                    Weight              122.00 lb            

 

                    BMI (Body Mass Index) 23.4 kg/m2           

 

                    Ideal Body Weight   100 lb               

 

                    Weight              55.339 kg            

 

                    BSA (Body Surface Area) 1.52 m2              







Results





        Test    Acquired Date Facility Test    Result  H/L     Range   Note

 

                    CBC With Differential 2021          [facility]

                                        [address] [place] [address] [phone] White Blood Count 10.4 10    High       4.0-10.0    

 

             Red Blood Count 4.13 10      Normal       4.00-5.40     

 

             Hemoglobin   11.9 g/dL    Low          12.0-15.5     

 

             Hematocrit   36.9 %       Normal       36.0-47.0     

 

             Mean Corpuscular Volume 89.3 fl      Normal       80.0-96.0     

 

             Mean Corpuscular Hemoglobin 28.8 pg      Normal       27.0-33.0    

 

 

             Mean Corpuscular HGB Conc 32.2 g/dL    Normal       32.0-36.5     

 

             Red Cell Distribution Width 19.7 %       High         11.5-14.5    

 

 

             Platelet Count, Automated 454 10       High         150-450       

 

             Neutrophils % 68.2 %       High         36.0-66.0     

 

             Lymph %      21.9 %       Low          24.0-44.0     

 

             Mono %       8.1 %        High         2.0-8.0       

 

             Eos %        0.7 %        Normal       0.0-3.0       

 

             Baso %       0.6 %        Normal       0.0-1.0       

 

             Immature Granulocyte % 0.5 %        Normal       0-3.0         

 

             Nucleated Red Blood Cell % 0.0 %        Normal       0-0           

 

             Neutrophils # 7.1 10       Normal       1.5-8.5       

 

             Lymph #      2.3 10       Normal       1.5-5.0       

 

             Mono #       0.9 10       High         0.0-0.8       

 

             Eos #        0.1 10       Normal       0.0-0.5       

 

             Baso #       0.1 10       Normal       0.0-0.2       

 

                    Laboratory test finding 2021          [facility]

                                        [address] [place] [address] [phone] C Reactive Protein Quantitativ 0.64 mg/dL High       0

.00-0.30   

 

             Erythrocyte Sedimentation Rate 39 mm/hr     High         0-30      

    

 

                    Basic Metabolic Profile 2021          [facility]

                                        [address] [place] [address] [phone] Glucose, Fasting 126 mg/dL  High             

 

             Blood Urea Nitrogen 13 mg/dL     Normal       7-18          

 

             Creatinine For GFR 0.84 mg/dL   Normal       0.55-1.30     

 

             Glomerular Filtration Rate > 60.0       Normal       >45          1

 

             Sodium Level 140 mEq/L    Normal       136-145       

 

             Potassium Serum 4.4 mEq/L    Normal       3.5-5.1       

 

             Chloride Level 106 mEq/L    Normal               

 

             Carbon Dioxide Level 28 mEq/L     Normal       21-32         

 

             Anion Gap    6 mEq/L      Low          8-16          

 

             Calcium Level 9.6 mg/dL    Normal       8.8-10.2      

 

                    Liver Profile       2021          [facility]

                                        [address] [place] [phone] [phone] Ast/Sgot   20 U/L     Normal     7-37        

 

             Alt/SGPT     30 U/L       Normal       12-78         

 

             Alkaline Phosphatase 95 U/L       Normal               

 

             Bilirubin,Total 0.4 mg/dL    Normal       0.2-1.0       

 

             Bilirubin,Direct 0.1 mg/dL    Normal       0.0-0.2       

 

             Total Protein 6.9 GM/DL    Normal       6.4-8.2       

 

             Albumin      3.3 GM/DL    Normal       3.2-5.2       

 

             Albumin/Globulin Ratio 0.9          Low          1.2-2.2       

 

                    Vitamin B12 & Folate 2021          [facility]

enter Main Lab

                                        [address] [place] [address] [phone] Vitamin B12 Level 598 pg/mL  Normal                2

 

             Folate       > 24.0 NG/ML Normal                    3

 

                    Total Iron Binding Capacit 2021          [facility]

                                        [address] [place] [address] [phone] Iron (Fe)  33 g/dL  Low              

 

             Total Iron Binding Capacity 424 g/dL   Normal       250-450      

 

 

             Percent Saturation 7.8 %        Low          13.2-45.0     

 

                    Laboratory test finding 2021          [facility]

                                        [address] [place] [address] [phone] Calprotectin Stool 40 ug/g    Normal     0-120      4

 

                    Fat Fecal Qualitative 2021          [facility]

                                        [address] [place] [address] [phone] Fats Neutral Normal     Normal     .          5

 

             Fats Total   Normal       Normal       .            6

 

                    Laboratory test finding 2021          [facility]

                                        [address] [place] [address] [phone] Pancreatic Elastase Stool TNP        Normal           

     7

 

                    BUN & Creatinine ([facility]) 2021          [facility]

                                        [address] [place] [address] [phone] Blood Urea Nitrogen 16 mg/dL   Normal     7-18        

 

                    Creatinine With GFR 2021          [facility]

nter Main Lab

                                        [address] [place] [address] [phone] Creatinine For GFR 0.80 mg/dL Normal     0.55-1.30   

 

             Glomerular Filtration Rate > 60.0       Normal       >45          8







                          1                         Units are mL/min/1.73 m2



Chronic Kidney Disease Staging per NKF:



Stage I & II   GFR >=60       Normal to Mildly Decreased

Stage III      GFR 30-59      Moderately Decreased

Stage IV       GFR 15-29      Severely Decreased

Stage V        GFR <15        Very Little GFR Left

ESRD           GFR <15 on RRT



 

                          2                         VITAMIN B12 NORMAL RANGE



NORMAL                     247 - 911 PG/ML

INDETERMINATE              211 - 246 PG/ML

DEFICIENT              LESS THAN 211 PG/ML



 

                          3                         FOLATE NORMAL RANGE



NORMAL             GREATER THAN 5.4 NG/ML

INDETERMINATE               3.4-5.4 NG/ML

DEFICIENT             LESS THAN 3.4 NG/ML



 

                          4                         Concentration     Interpreta

tion   Follow-Up

<16 - 50 ug/g     Normal           None

>50 -120 ug/g     Borderline       Re-evaluate in 4-6 weeks

>120 ug/g     Abnormal         Repeat as clinically

indicated



 

                          5                         Normal (<60 Droplets/HPF)



 

                          6                         Normal (<100 Droplets/HPF)



 

                          7                         Test not performed. Consiste

ncy of stool specimen too watery

for analysis.

TEST: [id number] Pancreatic Elastase, Fecal



Testing performed at reference lab . Report copy to follow

on a separate form. 21 REF LAB#:[id number]



 

                          8                         Units are mL/min/1.73 m2



Chronic Kidney Disease Staging per [facility]:



Stage I & II   GFR >=60       Normal to Mildly Decreased

Stage III      GFR 30-59      Moderately Decreased

Stage IV       GFR 15-29      Severely Decreased

Stage V        GFR <15        Very Little GFR Left

ESRD           GFR <15 on RRT









Procedures





                Date            Code            Description     Status

 

                2021      68868           Office/Outpatient Established Mo

d MDM 30-39 Min Completed

 

                2021      57623           Office/Outpatient Established Mo

d MDM 30-39 Min Completed

 

                2021      63687           Office/Outpatient Established Lo

w MDM 20-29 Min Completed







[employer]





                                        Description

 

                                        No Information Available







Encounters





           Type       Date       Location   Provider   Dx         Diagnosis

 

                Office Visit    2021 11:40a [facility] Sowmya Palomo M.D.           K50.812                   Crohn's disease of both smal

l and lg int w intestinal 

obst

 

                          Z93.2                     Ileostomy status

 

                Office Visit    2021 11:40a [facility] Sowmya Palomo M.D.           K50.812                   Crohn's disease of both smal

l and lg int w intestinal 

obst

 

                          Z93.2                     Ileostomy status

 

                Office Visit    2021  3:20p [facility] Israel Palomo M.D.           K50.812                   Crohn's disease of both smal

l and lg int w intestinal 

obst

 

                          Z93.2                     Ileostomy status







Assessments





                Date            Code            Description     Provider

 

                    2021          K50.812             Crohn's disease of b

oth small and large intestine with 

intestinal obstruction                  Israel Palomo M.D.

 

                2021      Z93.2           Ileostomy status Israel conner M.D.

 

                    2021          K50.812             Crohn's disease of b

oth small and large intestine with 

intestinal obstruction                  Israel Palomo M.D.

 

                2021      Z93.2           Ileostomy status Israel conner M.D.

 

                    2021          K50.812             Crohn's disease of b

oth small and large intestine with 

intestinal obstruction                  Israel Palomo M.D.

 

                2021      Z93.2           Ileostomy status Israel conner M.D.







Plan of Treatment





No Information Available



Functional Status





                                        Description

 

                                        No Information Available







Mental Status





                                        Description

 

                                        No Information Available







Referrals





                Refer to Dr     Reason for Referral Status          Appt Date

 

                          [facility] Please evaluate fo

r dietary educated for 

this patient with permanent ileostomy and high ileostomy output. Patient would 
benefit from low fiber, low fat and high protein diet. thank you.  Created      

             



 

                                        [facility]

 

                                        [place] [address]

 

                                        [phone]

 

                                          

 

                Stillerman, James, MD ileostomy care due to leakage.  Created   

      

 

                                        [facility]

 

                                        [address] [facility] [facility]  [address]

 

                                        [phone]

## 2021-10-28 NOTE — CCD
Continuity of Care Document (CCD)

                             Created on: 2021



Vanesa Kinsey

External Reference #: [id number]

: 1955

Sex: Female



Demographics





                          Address                   [address] [place]  [address]

 

                          Home Phone                [phone]

 

                          Preferred Language        Unknown

 

                          Marital Status            Unknown

 

                          Gnosticist Affiliation     Unknown

 

                          Race                      White

 

                          Ethnic Group              Not  or 





Author





                          Author                    Vanesa Douglas

 

                          Organization              Unknown

 

                          Address                   [address]  [address]



 

                          Phone                     [phone]







Care Team Providers





                    Care Team Member Name Role                Phone

 

                    Jessica Cervantes MD    AUTM                [phone]

 

                    Olya Bangura ANP   AUTM                [phone]

 

                    Israel Farmer MD AUTM                [phone]







Problems





                    Active Problems     Provider            Date

 

                    Pulmonary embolism  NIMA Douglas    Onset: 04/10/2012

 

                    Gastroesophageal reflux disease LUCRETIA Ring Onset

: 04/10/2012

 

                    Pure hypercholesterolemia LUCRETIA Ring Onset: 

 

                    Type 2 diabetes mellitus LUCRETIA Ring Onset: 04/10

/2012

 

                    Anemia              Marc Rodriguez D.O. Onset: 04/10/

2012







Social History





                Type            Date            Description     Comments

 

                Birth Sex                       Unknown          

 

                ETOH Use                        Occasionally consumes beer  

 

                Tobacco Use     Start: Unknown End: Unknown Patient is a former 

smoker SMOKED FOR 

15YRS 1 MAGGY A DAY quit age 37 







Allergies, Adverse Reactions, Alerts





             Active Allergies Criticality  Reaction | Severity Comments     Date

 

             Tetracycline Unable to assess criticality              rash        

 2010







Medications





           Active Medications SIG        Qnty       Indications Ordering Provide

r Date

 

                          Alprazolam                     0.25mg Tablets         

          1/2 - one tablet

twice daily as needed anxiety 10tabs                          NIMA Douglas 0

2021

 

                          Zolpidem Tartrate                     5mg Tablets     

              take one 

tablet by mouth at bedtime as needed for sleep maximum daily dose = 1 tablet mdd
1               7tabs                           NIMA Douglas 06/10/2021

 

                          Estrace                     0.1mg/GM Cream            

       1/4 applicatorful 

vaginally at bedtime daily x 14 days then three times a week 42.500gm           

                     Olya Bates [facility]                               2021

 

                    Lutein                     20mg Capsules                   1

 by mouth every day 

                                        Olya Bates [facility]    2021

 

                          Lisinopril                     2.5mg Tablets          

         1 by mouth every 

day             90tabs          E11.21          [facility]

 

           Calcium + D 1200MG/D                         Josephine Escalante M.D. 

2019

 

                                        Bupropion Hydrochloride ER (XL)         

            150mg Tablets ER 24HR       

             take 1 tablet by mouth once daily 90tabs                    Olya Bates [facility] 

10/15/2019

 

                                        Fluticasone Propionate                  

   50mcg/Act Suspension                 

             2 sprays each nostril daily X 2 Weeks Then prn 16gm                

      [facility] 

[facility]

 

                                        Fortify Probiotic Womens Extra Strength 

                     Capsules Cait Walls,[facility] 20

18

 

                          Buspirone HCL                     15mg Tablets        

           Take 1 Tablet 

By Mouth Twice Daily 180tabs                         Olya Bates [facility] 2018

 

                          Vitamin D                     1000Unit Tablets        

           2 by mouth 

every day                                       Emily Aleman, [facility] 

8

 

                          Onetouch Ultra Blue                      Strips       

            use twice 

daily to check blood sugar dx e11.9 200units                        [facility] 10/11/2017

 

             Melatonin                     10mg Tablets                   1 hs p

o                                [facility]                             2016

 

                          Metformin HCL ER                     500mg Tablets ER 

24HR                   

take 2 tablets by mouth once daily with the largest meal of the day 180tabs     

                            

Olya Bates [facility]                        2016

 

                          Aspirin Childrens                     81mg Chewtabs   

                1 by mouth

every hs                                        [facility]

 

                          Loperamide HCL                     2mg Capsules       

            4 capsules at 

bedtime by mouth may take additional 4 capsules in daytime - not to exceed 16mg 
daily           240caps                         Olya Bates [facility] 2016

 

                          Ferrous Sulfate                     325(65Fe) mg Table

ts DR                   1 

by mouth every day                                 [facility] 

6

 

                          Womens One Daily                      Tablets         

          1 by mouth every

day                                             Emily Aleman, [facility] 

6

 

                          Atorvastatin Calcium                     20mg Tablets 

                  Take 1 

Tablet By Mouth Every Day 90tabs                          Olya Bates [facility] 

 

                          Omeprazole                     20mg Capsules DR       

            take 1 capsule

by mouth twice daily. maximum daily dose is 2 180caps                         An

n Krystal Marley, [facility] 

2010

 

             Similasan Earache Relief                      Solution             

                                             

Unknown                                 

 

           Omega-3 Drops For Eyes                                  Unknown    

 

                          Budesonide                     3mg Caps DR Part       

            3 tabs daily 

before dinner for four weeks, then 2 tabs daily for four weeks, then 1 tab daily
for remaining four weeks- Dr. Farmer's                                 Unknow

n         

 

                Stelara                     90mg/ml Soln Prefill Syringe        

                                            

                          Unknown                   







Medications Administered in Office





           Medication SIG        Qnty       Indications Ordering Provider Date

 

                          Covid-19 vaccine, Unspecified                      Inj

ection                    

                                                Unknown         2021

 

                          Covid-19 vaccine, Unspecified                      Inj

ection                    

                                                Unknown         2021

 

                                        Immunization Adminstration,1 Vaccine/Tox

oid                      Injection      

                                                    Cait Henson,[facility] 20

20







Immunizations





           CPT Code   Status     Date       Vaccine    Reaction   Lot #

 

                [id number]           Given           2021      Influenza Vaccin

e Quadrivalent Preser/Antibiotic Free Im 

Use                                                 [id number]

 

           [id number]      Given      2020 Pneumovax 23            [id number]

 

           08410      Given      2020 Adacel- Tetanus Diphtheria Pertussis

            [id number]

 

           U-Flu      Given      2019 Influenza,Unspecified             

 

           U-PneuC    Given      07/15/2019 Prevnar 13             

 

                [id number]           Given           07/15/2019      Shingrix Zoster 

Vaccine (HZV), Recombinant, Subunit, 

Adjuvanted                                           

 

           [id number]      Given      05/15/2019 Shingrix               

 

           U-Flu      Given      10/11/2018 Influenza,Unspecified             

 

           00438      Given      2017 PPD        0 mm read by Lashaun ortega, SIRENA [id number]

 

                 Given      2016 Fluvirin Virus Vaccine            16

[id number]

 

           82387      Given      10/10/2003 Pneumovax 23             







Vital Signs





                Date            Vital           Result          Comment

 

                2021  2:09pm Heart Rate      98 /min          

 

                    Height              61.25 inches        5'1.25"

 

                    Weight              122.00 lb            

 

                    O2 % BldC Oximetry  97 %                 

 

                    BMI (Body Mass Index) 22.9 kg/m2           

 

                2021 10:32am BP Systolic     130 mmHg         

 

                    BP Diastolic        64 mmHg              

 

                    Heart Rate          88 /min              

 

                    Height              61.25 inches        5'1.25"

 

                    Weight              125.00 lb            

 

                    O2 % BldC Oximetry  96 %                 

 

                    BMI (Body Mass Index) 23.4 kg/m2           







Results





        Test    Acquired Date Facility Test    Result  H/L     Range   Note

 

                    Laboratory test finding 2021          [facility]

                                        [address] [place] [address] [phone] Erythrocyte Sedimentation Rate 39 mm/hr   High       0

-30        

 

                    Liver Profile       2021          [facility]

nter

                                        [address] [place] [address] [phone] Ast/Sgot   20 U/L     Normal     7-37        

 

             Alt/SGPT     30 U/L       Normal       12-78         

 

             Alkaline Phosphatase 95 U/L       Normal               

 

             Bilirubin,Total 0.4 mg/dL    Normal       0.2-1.0       

 

             Bilirubin,Direct 0.1 mg/dL    Normal       0.0-0.2       

 

             Total Protein 6.9 GM/DL    Normal       6.4-8.2       

 

             Albumin      3.3 GM/DL    Normal       3.2-5.2       

 

             Albumin/Globulin Ratio 0.9          Low          1.2-2.2       

 

                    Basic Metabolic Profile 2021          [address] [address] [phone] Glucose, Fasting 126 mg/dL  High             

 

             Blood Urea Nitrogen 13 mg/dL     Normal       7-18          

 

             Creatinine For GFR 0.84 mg/dL   Normal       0.55-1.30     

 

             Glomerular Filtration Rate > 60.0       Normal       >45          1

 

             Sodium Level 140 mEq/L    Normal       136-145       

 

             Potassium Serum 4.4 mEq/L    Normal       3.5-5.1       

 

             Chloride Level 106 mEq/L    Normal               

 

             Carbon Dioxide Level 28 mEq/L     Normal       21-32         

 

             Anion Gap    6 mEq/L      Low          8-16          

 

             Calcium Level 9.6 mg/dL    Normal       8.8-10.2      

 

                    Total Iron Binding Capacit 2021          [facility]

                                        [address] [place] [address] [phone] Iron (Fe)  33 g/dL  Low              

 

             Total Iron Binding Capacity 424 g/dL   Normal       250-450      

 

 

             Percent Saturation 7.8 %        Low          13.2-45.0     

 

                    Vitamin B12 & Folate 2021          [address] [address] [phone] Vitamin B12 Level 598 pg/mL  Normal                2

 

             Folate       > 24.0 NG/ML Normal                    3

 

                    Laboratory test finding 2021          [address] [address] [phone] C Reactive Protein Quantitativ 0.64 mg/dL High       0

.00-0.30   

 

                    Fat Fecal Qualitative 2021          [address] [address] [phone] Fats Neutral Normal     Normal     .          4

 

             Fats Total   Normal       Normal       .            5

 

                    Laboratory test finding 2021          [address] [address] [phone] Pancreatic Elastase Stool TNP        Normal           

     6

 

             Calprotectin Stool 40 ug/g      Normal       0-120        7

 

                    CBC With Differential 2021          [address] [address] [phone] White Blood Count 10.4 10    High       4.0-10.0    

 

             Red Blood Count 4.13 10      Normal       4.00-5.40     

 

             Hemoglobin   11.9 g/dL    Low          12.0-15.5     

 

             Hematocrit   36.9 %       Normal       36.0-47.0     

 

             Mean Corpuscular Volume 89.3 fl      Normal       80.0-96.0     

 

             Mean Corpuscular Hemoglobin 28.8 pg      Normal       27.0-33.0    

 

 

             Mean Corpuscular HGB Conc 32.2 g/dL    Normal       32.0-36.5     

 

             Red Cell Distribution Width 19.7 %       High         11.5-14.5    

 

 

             Platelet Count, Automated 454 10       High         150-450       

 

             Neutrophils % 68.2 %       High         36.0-66.0     

 

             Lymph %      21.9 %       Low          24.0-44.0     

 

             Mono %       8.1 %        High         2.0-8.0       

 

             Eos %        0.7 %        Normal       0.0-3.0       

 

             Baso %       0.6 %        Normal       0.0-1.0       

 

             Immature Granulocyte % 0.5 %        Normal       0-3.0         

 

             Nucleated Red Blood Cell % 0.0 %        Normal       0-0           

 

             Neutrophils # 7.1 10       Normal       1.5-8.5       

 

             Lymph #      2.3 10       Normal       1.5-5.0       

 

             Mono #       0.9 10       High         0.0-0.8       

 

             Eos #        0.1 10       Normal       0.0-0.5       

 

             Baso #       0.1 10       Normal       0.0-0.2       

 

                    Total Iron Binding Capacit 2021          [address] [address] [phone] Iron (Fe)  139 g/dL Normal           

 

             Total Iron Binding Capacity 494 g/dL   High         250-450      

 

 

             Percent Saturation 28.1 %       Normal       13.2-45.0     

 

                    Laboratory test finding 2021          [address] [address] [phone]  Jak2 Mutations For Path Sendou See Pathology Re 

<SEE NOTE>  

Normal                                              8

 

                    BCR/Abl Screen For CML Path 2021          [address] [address] [phone]  BCR/Abl Screen For CML Path So See Pathology Re 

<SEE NOTE>  

Normal                                              9

 

                    Laboratory test finding 2021          [address] [address] [phone] Vitamin B12 Level 580 pg/mL  Normal     247-911    10

 

             Ferritin     19 NG/ML     Normal       8-252         

 

                    Comprehensive Metabolic Profil 2021          [address] [address] [phone] Glucose, Fasting 121 mg/dL  High             

 

             Blood Urea Nitrogen 27 mg/dL     High         7-18          

 

             Creatinine For GFR 1.00 mg/dL   Normal       0.55-1.30     

 

             Glomerular Filtration Rate 58.9         Normal       >45          1

1

 

             Sodium Level 137 mEq/L    Normal       136-145       

 

             Potassium Serum 4.2 mEq/L    Normal       3.5-5.1       

 

             Chloride Level 106 mEq/L    Normal               

 

             Carbon Dioxide Level 25 mEq/L     Normal       21-32         

 

             Anion Gap    6 mEq/L      Low          8-16          

 

             Calcium Level 8.9 mg/dL    Normal       8.8-10.2      

 

             Ast/Sgot     26 U/L       Normal       7-37          

 

             Alt/SGPT     40 U/L       Normal       12-78         

 

             Alkaline Phosphatase 79 U/L       Normal               

 

             Bilirubin,Total 0.5 mg/dL    Normal       0.2-1.0       

 

             Total Protein 7.5 GM/DL    Normal       6.4-8.2       

 

             Albumin      3.4 GM/DL    Normal       3.2-5.2       

 

             Albumin/Globulin Ratio 0.8          Low          1.2-2.2       

 

                    CBC With Differential 2021          [facility]

                                        [address] [place], [place] [id number] [phone] White Blood Count 15.0 10    High       4.0-10.0    

 

             Red Blood Count 4.63 10      Normal       4.00-5.40     

 

             Hemoglobin   12.9 g/dL    Normal       12.0-15.5     

 

             Hematocrit   39.2 %       Normal       36.0-47.0     

 

             Mean Corpuscular Volume 84.7 fl      Normal       80.0-96.0     

 

             Mean Corpuscular Hemoglobin 27.9 pg      Normal       27.0-33.0    

 

 

             Mean Corpuscular HGB Conc 32.9 g/dL    Normal       32.0-36.5     

 

             Red Cell Distribution Width 19.1 %       High         11.5-14.5    

 

 

             Platelet Count, Automated 319 10       Normal       150-450       

 

             Neutrophils % 92.5 %       High         36.0-66.0     

 

             Lymph %      5.4 %        Low          24.0-44.0     

 

             Mono %       0.7 %        Low          2.0-8.0       

 

             Eos %        0.0 %        Normal       0.0-3.0       

 

             Baso %       0.1 %        Normal       0.0-1.0       

 

             Immature Granulocyte % 1.3 %        Normal       0-3.0         

 

             Nucleated Red Blood Cell % 0.0 %        Normal       0-0           

 

             Neutrophils # 13.9 10      High         1.5-8.5       

 

             Lymph #      0.8 10       Low          1.5-5.0       

 

             Mono #       0.1 10       Normal       0.0-0.8       

 

             Eos #        0.0 10       Normal       0.0-0.5       

 

             Baso #       0.0 10       Normal       0.0-0.2       

 

                    Complete Blood Count 2021          [facility]

s, pc

: Dr Dhaval Miranda

[place] [address]

           [phone] WBC        9.2 x10*3/UL            4.1 - 10.9 12

 

             RBC          4.37 x10*6/UL              4.20 - 6.30   

 

             Hemoglobin   12.3 g/dL                 12.0 - 18.0   

 

             Hematocrit   36.6 %       Low          37.0 - 51.0   

 

             MCV          83.6 fL                   80.0 - 97.0   

 

             MCH          28.2 pg                   26.0 - 32.0   

 

             MCHC         33.7 g/dL                 31.0 - 38.0   

 

             RDW          15.5 %       High         11.6 - 13.7   

 

             PLT          506 x10*3/UL High         140 - 440     

 

             MPV          7.1 FL       Low          7.8 - 11.0    

 

             Lymph %      19.4 %                    10.0 - 58.5   

 

             Mid %        5.4 %                     1.7 - 9.3     

 

             Neut %       75.2 %                    37.0 - 92.0   

 

             Lymph #      1.7 x10*3/UL              0.6 - 4.1     

 

             Mid #        0.6 x10*3/UL              0.1 - 0.6     

 

             Neut #       6.9 x10*3/UL              2.0 - 7.8     

 

                    A1c                 2021          [facility]

, 

: Dr Dhaval Miranda

[place] [address] [phone] Hba1c      6.0 %      High       <5.7       13

 

             Est Avg Glucose 125 mg/dL    High         60 - 110      

 

                    Comprehensive Chem Profile 2021          [facility]

[facility], 

: Dr Dhaval Miranda

[place] [address] [phone] Glucose    94 mg/dL              74 - 99    14

 

             BUN          19 mg/dL     High         7 - 18        

 

             Creatinine   1.0 mg/dL                 0.6 - 1.3     

 

             Sodium       142 mEq/L                 136 - 145     

 

             Potassium    4.1 mEq/L                 3.5 - 5.1     

 

             Chloride     104 mEq/L                 98 - 107      

 

             Carbon Dioxide 30 mEq/L                  21 - 32       

 

             Calcium      9.0 mg/dL                 8.5 - 10.1    

 

             Alk. Phosphatase 112 mg/dL                 46 - 116      

 

             Total Bilirubin 0.3 mg/dL                 0.2 - 1.0     

 

             Ast (Sgot)   17 U/L                    15 - 37       

 

             Alt (SGPT)   23 U/L                    12 - 78       

 

             Albumin      3.0 g/dL     Low          3.4 - 5.0     

 

             Total Protein 7.4 g/dL                  6.4 - 8.2     

 

             A/G Ratio    0.68 CALC    Low          1.00 - 1.90   

 

             GFR  55 mL/min    Low          >60           

 

             GFR African American >= 60 mL/min              >60          15

 

                    Lipid Profile       2021          [facility]

, 

: Dr Dhaval Miranda

[place] [address] [phone] Cholesterol 143 mg/dL             131 - 200   

 

             Triglycerides 105 mg/dL                 30 - 150      

 

             HDL Cholesterol 89 mg/dL     High         35 - 60       

 

             LDL (Calculated) 33 CALC      Low          50 - 159      

 

                    Laboratory test finding 2021          [facility]

[facility]

: Dr Dhaval Miranda

[place], [place] [address] [phone] Magnesium  1.9 mg/dL             1.8 - 2.4   

 

                    Laboratory test finding 2021          [facility]

                                        [address] [place] [address] [phone] Blood Urea Nitrogen 16 mg/dL   Normal     7-18        

 

                    Creatinine With GFR 2021          [facility]

nter

                                        [address] [place] [address] [phone] Creatinine For GFR 0.80 mg/dL Normal     0.55-1.30   

 

             Glomerular Filtration Rate > 60.0       Normal       >45          1

6







                          1                         Units are mL/min/1.73 m2



Chronic Kidney Disease Staging per NKF:



Stage I & II   GFR >=60       Normal to Mildly Decreased

Stage III      GFR 30-59      Moderately Decreased

Stage IV       GFR 15-29      Severely Decreased

Stage V        GFR <15        Very Little GFR Left

ESRD           GFR <15 on RRT



 

                          2                         VITAMIN B12 NORMAL RANGE



NORMAL                     247 - 911 PG/ML

INDETERMINATE              211 - 246 PG/ML

DEFICIENT              LESS THAN 211 PG/ML



 

                          3                         FOLATE NORMAL RANGE



NORMAL             GREATER THAN 5.4 NG/ML

INDETERMINATE               3.4-5.4 NG/ML

DEFICIENT             LESS THAN 3.4 NG/ML



 

                          4                         Normal (<60 Droplets/HPF)



 

                          5                         Normal (<100 Droplets/HPF)



 

                          6                         Test not performed. Consiste

ncy of stool specimen too watery

for analysis.

TEST: [id number] Pancreatic Elastase, Fecal



Testing performed at reference lab . Report copy to follow

on a separate form. 21 REF LAB#:[id number]



 

                          7                         Concentration     Interpreta

tion   Follow-Up

<16 - 50 ug/g     Normal           None

>50 -120 ug/g     Borderline       Re-evaluate in 4-6 weeks

>120 ug/g     Abnormal         Repeat as clinically

indicated



 

                          8                         See Pathology Report

Specimen Collection for Pathology Reference Lab Testing.

Refer to [facility] Pathology Report for Results: [id number]



 

                          9                         See Pathology Report

Specimen Collection for Pathology Reference Lab Testing.

Refer to [facility] Pathology Report for Results:[id number]



 

                          10                        VITAMIN B12 NORMAL RANGE



NORMAL                     247 - 911 PG/ML

INDETERMINATE              211 - 246 PG/ML

DEFICIENT              LESS THAN 211 PG/ML



 

                          11                        Units are mL/min/1.73 m2



Chronic Kidney Disease Staging per NKF:



Stage I & II   GFR >=60       Normal to Mildly Decreased

Stage III      GFR 30-59      Moderately Decreased

Stage IV       GFR 15-29      Severely Decreased

Stage V        GFR <15        Very Little GFR Left

ESRD           GFR <15 on RRT



 

                          12                        NOTE:

CBC VERIFIED







 

                          13                        Lab Result Notes:

Pre-Diabetes   5.7 - 6.4 %

Diabetes           = or > 6.5%



 

                          14                        100-125 mg/dL     PRE-DIABET

ES/FASTING

>126 mg/dL          DIABETES/FASTING



 

                          15                        CHRONIC KIDNEY DISEASE STAGI

NG PER NKF



STAGE I & II      GFR >= 60        NORMAL TO MILDLY DECREASED

STAGE III          GFR 30-59          MODERATELY DECREASED

STAGE IV           GFR 15-29         SEVERELY DECREASED

STAGE V            GFR <15            VERY LITTLE GFR LEFT

ESRD                 GFR <15            ON RRT



 

                          16                        Units are mL/min/1.73 m2



Chronic Kidney Disease Staging per NKF:



Stage I & II   GFR >=60       Normal to Mildly Decreased

Stage III      GFR 30-59      Moderately Decreased

Stage IV       GFR 15-29      Severely Decreased

Stage V        GFR <15        Very Little GFR Left

ESRD           GFR <15 on RRT









Procedures





                Date            Code            Description     Status

 

                2021      63913           Office/Outpatient Established Lo

w MDM 20-29 Min Completed

 

                    2021          69201               Chronic Care MGMT 20

 Mins Clinical Staff Time Per Calendar 

Month                                   Completed

 

                    2021          44393               Chronic Care MGMT 20

 Mins Clinical Staff Time Per Calendar 

Month                                   Completed

 

                2021      37192           Chronic Care Management Services

 Ea Addl 20 Min Completed

 

                2021      75080           Office/Outpatient Established Mo

d MDM 30-39 Min Completed

 

                    2021          49169               Chronic Care MGMT 20

 Mins Clinical Staff Time Per Calendar 

Month                                   Completed

 

                2021      [id number]       Diabetic Retinal Eye Exam Comple

[facility]

 

                2020      [id number]       Bone Mineral Density Test Comple

[facility]

 

                2020      [id number]        Mammogram       Completed

 

                2019      [id number]       Diabetic Retinal Eye Exam Comple

[facility]

 

                2018      [id number]       Bone Mineral Density Test Comple

[facility]

 

                2018      [id number]        Mammogram       Completed

 

                2016      [id number]        Mammogram       Completed

 

                2016      [id number]        Mammogram       Completed

 

                2014      [id number]        Mammogram       Completed

 

                2011      [id number]        Mammogram       Completed

 

                2011      [id number]       Bone Mineral Density Test Comple

[facility]

 

                2010      [id number]       Diabetic Foot Exam Completed

 

                2010      [id number]        Mammogram       Completed

 

                10/22/2008      [id number]       Bone Mineral Density Test Comple

[facility]

 

                2006      [id number]        Mammogram       Completed







[employer]





                                        Description

 

                                        No Information Available







Encounters





           Type       Date       Location   Provider   Dx         Diagnosis

 

             Office Visit 2021 10:20a [facility] Olya Hutchins, [facility] 

E11.21                                  Type 2 diabetes mellitus with diabetic n

ephropathy

 

                          N18.31                    Chronic kidney disease, stag

e 3a

 

                          K21.9                     Gastro-esophageal reflux dis

ease without esophagitis

 

                          E55.9                     Vitamin D deficiency, unspec

ified

 

                          F41.9                     Anxiety disorder, unspecifie

d

 

                          D50.9                     Iron deficiency anemia, unsp

ecified

 

                          K50.918                   Crohn's disease, unspecified

, with other complication

 

                          Z93.2                     Ileostomy status

 

                          M85.80                    Oth disrd of bone density an

d structure, unspecified site

 

                          E78.1                     Pure hyperglyceridemia

 

             Office Visit 2021  9:00a [facility] Olya Hutchins, [facility] 

E11.21                                  Type 2 diabetes mellitus with diabetic n

ephropathy

 

                          N18.31                    Chronic kidney disease, stag

e 3a

 

                          D69.6                     Thrombocytopenia, unspecifie

d

 

                          K21.9                     Gastro-esophageal reflux dis

ease without esophagitis

 

                          E78.00                    Pure hypercholesterolemia, u

nspecified

 

                          E55.9                     Vitamin D deficiency, unspec

ified

 

                          F41.9                     Anxiety disorder, unspecifie

d

 

                          D50.9                     Iron deficiency anemia, unsp

ecified

 

                          K50.918                   Crohn's disease, unspecified

, with other complication

 

                          N95.2                     Postmenopausal atrophic vagi

nitis

 

                          E78.2                     Mixed hyperlipidemia

 

                          Z93.2                     Ileostomy status







Assessments





                Date            Code            Description     Provider

 

                2021      E11.21          Type 2 diabetes mellitus with di

abetic nephropathy NIMA Douglas

 

                2021      N18.31          Chronic kidney disease, stage 3a

 NIMA Douglas

 

                2021      K21.9           Gastro-esophageal reflux disease

 without esophagitis NIMA Douglas

 

                2021      E55.9           Vitamin D deficiency, unspecifie

d NIMA Douglas

 

                2021      F41.9           Anxiety disorder, unspecified An

n Krystal Marley, [facility]

 

                2021      D50.9           Iron deficiency anemia, unspecif

ied Olya Bates, [facility]

 

                2021      K50.918         Crohn's disease, unspecified, wi

th other complication Olya Bates, [facility]

 

                2021      Z93.2           Ileostomy status Olya Bates, [facility]

 

                2021      M85.80          Other specified disorders of bon

e density and structure, uns 

Olya Bates, [facility]

 

                2021      E78.1           Pure hyperglyceridemia Olya Hutchins, [facility]

 

                2021      E11.9           Type 2 diabetes mellitus without

 complications Dhaval Miranda MD

 

                2021      K21.9           Gastro-esophageal reflux disease

 without esophagitis Dhaval Miranda MD

 

                2021      E55.9           Vitamin D deficiency, unspecifie

d Dhaval Miranda MD

 

                2021      N18.31          Chronic kidney disease, stage 3a

 Dhaval Miranda MD

 

                2021      K21.9           Gastro-esophageal reflux disease

 without esophagitis Dhaval Miranda MD

 

                2021      E78.00          Pure hypercholesterolemia, unspe

cified Dhaval Miranda MD

 

                2021      E11.21          Type 2 diabetes mellitus with di

abetic nephropathy Olya Rice 

Donell, [facility]

 

                2021      N18.31          Chronic kidney disease, stage 3a

 Olya Rice [place], [facility]

 

                2021      D69.6           Thrombocytopenia, unspecified An

n Krystal Marely, [facility]

 

                2021      K21.9           Gastro-esophageal reflux disease

 without esophagitis Olya Bates, [facility]

 

                2021      E78.00          Pure hypercholesterolemia, unspe

cified Olya Bates, [facility]

 

                2021      E55.9           Vitamin D deficiency, unspecifie

d Olya Bates, [facility]

 

                2021      F41.9           Anxiety disorder, unspecified An

n Krystal Marley, [facility]

 

                2021      D50.9           Iron deficiency anemia, unspecif

ied Olya Bates, [facility]

 

                2021      K50.918         Crohn's disease, unspecified, wi

th other complication Olya Bates, [facility]

 

                2021      N95.2           Postmenopausal atrophic vaginiti

s Olya Bates, [facility]

 

                2021      E78.2           Mixed hyperlipidemia NIMA Douglas

 

                2021      Z93.2           Ileostomy status MITCHELL Douglas

 

                2021      E11.21          Type 2 diabetes mellitus with di

abetic nephropathy Dhaval Miranda MD

 

                2021      N18.31          Chronic kidney disease, stage 3a

 Dhaval Miranda MD

 

                2021      K21.9           Gastro-esophageal reflux disease

 without esophagitis Dhaval Miranda MD

 

                2021      E78.00          Pure hypercholesterolemia, unspe

cified Dhaval Miranda MD







Plan of Treatment

Future Appointment(s):* 2021  8:00 am - NIMA Douglas at [facility]

* 2022  9:00 am - [facility]

* 2022  9:20 am - NIMA Douglas at [facility]





Functional Status





                                        Description

 

                                        No Information Available







Mental Status





                                        Description

 

                                        No Information Available







Referrals





                Refer to Dr     Reason for Referral Status          Appt Date

 

                [facility] Hematology/Oncology NP CONSULT FOR ELEVATED PLATELETS  Jesus paniagua Notified 

2021

 

                                        [address] [place]  [address]

 

                                        [phone]

## 2021-10-28 NOTE — CCD
Continuity of Care Document (CCD)

                             Created on: 2021



Vanesa Kinsey

External Reference #: [id number]

: 1955

Sex: Female



Demographics





                          Address                   [address] [place] [address]  [address]

 

                          Home Phone                [phone]

 

                          Preferred Language        Unknown

 

                          Marital Status            Unknown

 

                          Sikh Affiliation     Unknown

 

                          Race                      White

 

                          Ethnic Group              Declined to Specify/Unknown





Author





                          Author                    Vanesa PALOMO M.D.

 

                          Organization              Unknown

 

                          Address                   [address], [address]

[place]  [address]



 

                          Phone                     [phone]







Care Team Providers





                    Care Team Member Name Role                Phone

 

                    Josephine Traore M.D. AUTM                [phone]

 

                    Olya Bangura  AUTM                [phone]







Problems





                                        Description

 

                                        No Information Available







Social History





                Type            Date            Description     Comments

 

                Birth Sex                       Unknown          

 

                ETOH Use                        2 A Week         

 

                Tobacco Use     Start: Unknown  Non Smoker       







Allergies, Adverse Reactions, Alerts





             Active Allergies Reaction     Severity     Comments     Date

 

             Tetracycline                                        2021







Medications





           Active Medications SIG        Qnty       Indications Ordering Provide

r Date

 

                          Stelara                     90mg/ml Soln Prefill Syrin

ge                   90 mg

dose, subcutaneous injection every 8 weeks ( to be started 8 weeks after the iv 
induction dose). 2ml             K50.812         Israel Palomo M.D. 

 

           Fish Oil 1400MG Softgel 2caps po qd                       Unknown    



 

           Jay 3 Eye Drops instill bid                       Unknown    0

000

 

             Ocuvite-Lutein                      Capsules                   1cap

 po qd                             

Unknown                                 

 

                          Omeprazole                     20mg Capsules        

            1cap by mouth 

twice a day     60caps                          Israel Palomo M.D. 

 

             Womens One Daily                      Tablets                   1ta

b po qd                             

Unknown                                 

 

                          Vitamin D                     25mcg (1000 Ut) Tablets 

                  2tabs po

qd                                              Unknown         

 

             Aspirin 81                     81mg Tablets DR Nicolet

ab po qd                             

Unknown                                 

 

                          Ferrous Sulfate                     325(65Fe) mg Table

ts                    

1tab po qd                                      Unknown         

 

             Melatonin                     10mg Capsules                   1cap 

po qhs                            

Unknown                                 

 

                                        Fortify Probiotic Womens Extra Strength 

                     Capsules DR        

             1cap po qd                             Unknown      

 

           Calcium + D 1200MG/D 1tab po qd                       Unknown    

 

                          Atorvastatin Calcium                     20mg Tablets 

                  1tab po 

qd                                              Unknown         

 

                          Metformin HCL                     500mg Tablets       

            2tabs (1000mg)

po qd                                           Unknown         

 

                Buspirone HCL                     15mg Tablets                  

 1tab po bid                      

                          Unknown                   

 

                                        Bupropion Hydrochloride ER (XL)         

            150mg Tablets ER 24HR       

             1tab po qd                             Unknown      

 

             Lisinopril                     2.5mg Tablets                   1tab

 po qd                             

Unknown                                 

 

                                        History Medications

 

                          Prednisone                     10mg Tablets           

        take 40 mg dose 

daily for 20 days, then 20 mg dose for 5 days and then 10 mg dose for 5 days and
then 5 mg dose for 6 days. 98tabs          K50.812         Israel Palomo M.D. 2021 -

2021

 

                          Stelara                     130mg/26ML Solution       

            260 mg 

induction dose once. ( to be followed with subcutaneous maintenance dose90 mg 
every 8 weeks)  52ml                            Israel Palomo M.D. 2021 - 2021

 

                          Budesonide                     3mg Caps DR Part       

            3 tabs by 

mouth once a day before dinner for 4 weeks then 2 tabs daily for next 4 weeks 
and then 1 tablet daily last 4 week. 180caps         K50.812         Israel martinez M.D. 

2021 - 2021

 

                          Entyvio                     300mg Solution Rec        

           iv 300 mg 

infusion at 0, 2 and 6 weeks and then every 8 weeks. 3units              K50.812

             Israel Palomo M.D.                         2021 - 2021







Immunizations





                                        Description

 

                                        No Information Available







Vital Signs





                Date            Vital           Result          Comment

 

                2021 12:11pm BP Systolic     126 mmHg         

 

                    BP Diastolic        62 mmHg              

 

                    Height              60.5 inches         5'0.50"

 

                    Weight              122.00 lb            

 

                    BMI (Body Mass Index) 23.4 kg/m2           

 

                    Ideal Body Weight   100 lb               

 

                    Weight              55.339 kg            

 

                    BSA (Body Surface Area) 1.52 m2              

 

                2021  4:12pm BP Systolic     118 mmHg         

 

                    BP Diastolic        60 mmHg              

 

                    Height              60.5 inches         5'0.50"

 

                    Weight              117.00 lb            

 

                    BMI (Body Mass Index) 22.5 kg/m2           

 

                    Ideal Body Weight   100 lb               

 

                    Weight              53.071 kg            

 

                    BSA (Body Surface Area) 1.50 m2              







Results





        Test    Acquired Date Facility Test    Result  H/L     Range   Note

 

                    Laboratory test finding 2021          [facility]

                                        [address] [place] [address] [phone] C Reactive Protein Quantitativ 0.64 mg/dL High       0

.00-0.30   

 

             Erythrocyte Sedimentation Rate 39 mm/hr     High         0-30      

    

 

                    Basic Metabolic Profile 2021          [facility]

                                        [address] [place] [address] [phone] Glucose, Fasting 126 mg/dL  High             

 

             Blood Urea Nitrogen 13 mg/dL     Normal       7-18          

 

             Creatinine For GFR 0.84 mg/dL   Normal       0.55-1.30     

 

             Glomerular Filtration Rate > 60.0       Normal       >45          1

 

             Sodium Level 140 mEq/L    Normal       136-145       

 

             Potassium Serum 4.4 mEq/L    Normal       3.5-5.1       

 

             Chloride Level 106 mEq/L    Normal               

 

             Carbon Dioxide Level 28 mEq/L     Normal       21-32         

 

             Anion Gap    6 mEq/L      Low          8-16          

 

             Calcium Level 9.6 mg/dL    Normal       8.8-10.2      

 

                    Liver Profile       2021          [facility]

nter Main Lab

                                        [address] [place] [address] [phone] Ast/Sgot   20 U/L     Normal     7-37        

 

             Alt/SGPT     30 U/L       Normal       12-78         

 

             Alkaline Phosphatase 95 U/L       Normal               

 

             Bilirubin,Total 0.4 mg/dL    Normal       0.2-1.0       

 

             Bilirubin,Direct 0.1 mg/dL    Normal       0.0-0.2       

 

             Total Protein 6.9 GM/DL    Normal       6.4-8.2       

 

             Albumin      3.3 GM/DL    Normal       3.2-5.2       

 

             Albumin/Globulin Ratio 0.9          Low          1.2-2.2       

 

                    Vitamin B12 & Folate 2021          [facility]

enter Main Lab

                                        [address] [place] [address] [phone] Vitamin B12 Level 598 pg/mL  Normal                2

 

             Folate       > 24.0 NG/ML Normal                    3

 

                    Total Iron Binding Capacit 2021          [facility]

                                        [address] [place] [address] [phone] Iron (Fe)  33 g/dL  Low              

 

             Total Iron Binding Capacity 424 g/dL   Normal       250-450      

 

 

             Percent Saturation 7.8 %        Low          13.2-45.0     

 

                    Laboratory test finding 2021          [facility]

                                        [address] [place] [address] [phone] Calprotectin Stool 40 ug/g    Normal     0-120      4

 

                    Fat Fecal Qualitative 2021          [facility] Lab

                                        [address] [place] [address] [phone] Fats Neutral Normal     Normal     .          5

 

             Fats Total   Normal       Normal       .            6

 

                    Laboratory test finding 2021          [facility]

                                        [address] [place] [address] [phone] Pancreatic Elastase Stool TNP        Normal           

     7

 

                    CBC With Differential 2021          [facility]

                                        [address] [place] [address] [phone] White Blood Count 10.4 10    High       4.0-10.0    

 

             Red Blood Count 4.13 10      Normal       4.00-5.40     

 

             Hemoglobin   11.9 g/dL    Low          12.0-15.5     

 

             Hematocrit   36.9 %       Normal       36.0-47.0     

 

             Mean Corpuscular Volume 89.3 fl      Normal       80.0-96.0     

 

             Mean Corpuscular Hemoglobin 28.8 pg      Normal       27.0-33.0    

 

 

             Mean Corpuscular HGB Conc 32.2 g/dL    Normal       32.0-36.5     

 

             Red Cell Distribution Width 19.7 %       High         11.5-14.5    

 

 

             Platelet Count, Automated 454 10       High         150-450       

 

             Neutrophils % 68.2 %       High         36.0-66.0     

 

             Lymph %      21.9 %       Low          24.0-44.0     

 

             Mono %       8.1 %        High         2.0-8.0       

 

             Eos %        0.7 %        Normal       0.0-3.0       

 

             Baso %       0.6 %        Normal       0.0-1.0       

 

             Immature Granulocyte % 0.5 %        Normal       0-3.0         

 

             Nucleated Red Blood Cell % 0.0 %        Normal       0-0           

 

             Neutrophils # 7.1 10       Normal       1.5-8.5       

 

             Lymph #      2.3 10       Normal       1.5-5.0       

 

             Mono #       0.9 10       High         0.0-0.8       

 

             Eos #        0.1 10       Normal       0.0-0.5       

 

             Baso #       0.1 10       Normal       0.0-0.2       

 

                    Creatinine With GFR 2021          [facility]

nter Main Lab

                                        [address] [place] [address] [phone] Creatinine For GFR 0.80 mg/dL Normal     0.55-1.30   

 

             Glomerular Filtration Rate > 60.0       Normal       >45          8

 

                    BUN & Creatinine ([facility]) 2021          [facility] Lab

                                        [address] [place] [address] [phone] Blood Urea Nitrogen 16 mg/dL   Normal     7-18        

 

                    CBC With Differential 2021          [facility]

                                        [address] [place] [address] [phone] White Blood Count 10.1 10    High       4.0-10.0    

 

             Red Blood Count 4.32 10      Normal       4.00-5.40     

 

             Hemoglobin   11.7 g/dL    Low          12.0-15.5     

 

             Hematocrit   36.6 %       Normal       36.0-47.0     

 

             Mean Corpuscular Volume 84.7 fl      Normal       80.0-96.0     

 

             Mean Corpuscular Hemoglobin 27.1 pg      Normal       27.0-33.0    

 

 

             Mean Corpuscular HGB Conc 32.0 g/dL    Normal       32.0-36.5     

 

             Red Cell Distribution Width 17.5 %       High         11.5-14.5    

 

 

             Platelet Count, Automated 510 10       High         150-450       

 

             Neutrophils % 70.4 %       High         36.0-66.0     

 

             Lymph %      20.5 %       Low          24.0-44.0     

 

             Mono %       7.0 %        Normal       2.0-8.0       

 

             Eos %        1.1 %        Normal       0.0-3.0       

 

             Baso %       0.6 %        Normal       0.0-1.0       

 

             Immature Granulocyte % 0.4 %        Normal       0-3.0         

 

             Nucleated Red Blood Cell % 0.0 %        Normal       0-0           

 

             Neutrophils # 7.1 10       Normal       1.5-8.5       

 

             Lymph #      2.1 10       Normal       1.5-5.0       

 

             Mono #       0.7 10       Normal       0.0-0.8       

 

             Eos #        0.1 10       Normal       0.0-0.5       

 

             Baso #       0.1 10       Normal       0.0-0.2       

 

                    Total Iron Binding Capacit 2021          [facility]

                                        [address] [place] [address]

           [phone] Iron (Fe)  37 g/dL  Low              

 

             Total Iron Binding Capacity 383 g/dL   Normal       250-450      

 

 

             Percent Saturation 9.7 %        Low          13.2-45.0     

 

                    Vitamin B12 & Folate 2021          [facility]

                                        [address] [place] [address] [phone] Vitamin B12 Level 764 pg/mL  Normal                9

 

             Folate       20.3 NG/ML   Normal                    10

 

                    Laboratory test finding 2021          [facility]

                                        [address] [place] [address] [phone] Erythrocyte Sedimentation Rate 44 mm/hr   High       0

-30        

 

             C Reactive Protein Quantitativ 0.66 mg/dL   High         0.00-0.30 

    

 

                    IBD Reflex Crohns Prognostic 2021          [facility]

                                        [address] [place] [address] [phone] Ibdser1    SEE SEPARATE REP <SEE NOTE>  Normal        

        11

 

             Crohn1       SEE SEPARATE REP <SEE NOTE>  Normal                   

 12

 

                    Laboratory test finding 2021          [facility]

                                        [address] [place] [address] [phone] Hepatitis B Surf AB Quant 4.9 mIU/mL Low        Immuni

ty>9.9 13

 

             Hepatitis B Core Antibody Igg Negative     Normal       Negative   

  14

 

                    Quanteferon TB Gold Test 2021          [facility]

                                        [address] [place] [address] [phone] QuantiFERON Criteria (SEE NOTE)  Normal     .         

 15

 

             QuantiFERON TB1 Ag Value 0.05 IU/mL   Normal       .             

 

             QuantiFERON TB2 Ag Value 0.05 IU/mL   Normal       .             

 

             QuantiFERON Nil Value 0.06 IU/mL   Normal       .             

 

             QuantiFERON Mitogen Value >10.00 IU/mL Normal       .             

 

             QuantiFERON-TB Gold Plus Negative     Normal       Negative     16

 

                    Prometh Monitr Crohns's Disease 2021          [facility]

                                        [address] [place] [address]

           [phone] Monitr Crohn's Disease SEE SEPARATE REP <SEE NOTE>  No

rmal                17







                          1                         Units are mL/min/1.73 m2



Chronic Kidney Disease Staging per NKF:



Stage I & II   GFR >=60       Normal to Mildly Decreased

Stage III      GFR 30-59      Moderately Decreased

Stage IV       GFR 15-29      Severely Decreased

Stage V        GFR <15        Very Little GFR Left

ESRD           GFR <15 on RRT



 

                          2                         VITAMIN B12 NORMAL RANGE



NORMAL                     247 - 911 PG/ML

INDETERMINATE              211 - 246 PG/ML

DEFICIENT              LESS THAN 211 PG/ML



 

                          3                         FOLATE NORMAL RANGE



NORMAL             GREATER THAN 5.4 NG/ML

INDETERMINATE               3.4-5.4 NG/ML

DEFICIENT             LESS THAN 3.4 NG/ML



 

                          4                         Concentration     Interpreta

tion   Follow-Up

<16 - 50 ug/g     Normal           None

>50 -120 ug/g     Borderline       Re-evaluate in 4-6 weeks

>120 ug/g     Abnormal         Repeat as clinically

indicated



 

                          5                         Normal (<60 Droplets/HPF)



 

                          6                         Normal (<100 Droplets/HPF)



 

                          7                         Test not performed. Consiste

ncy of stool specimen too watery

for analysis.

TEST: [id number] Pancreatic Elastase, Fecal



Testing performed at reference lab . Report copy to follow

on a separate form. 21 REF LAB#:[id number]



 

                          8                         Units are mL/min/1.73 m2



Chronic Kidney Disease Staging per NKF:



Stage I & II   GFR >=60       Normal to Mildly Decreased

Stage III      GFR 30-59      Moderately Decreased

Stage IV       GFR 15-29      Severely Decreased

Stage V        GFR <15        Very Little GFR Left

ESRD           GFR <15 on RRT



 

                          9                         VITAMIN B12 NORMAL RANGE



NORMAL                     247 - 911 PG/ML

INDETERMINATE              211 - 246 PG/ML

DEFICIENT              LESS THAN 211 PG/ML



 

                          10                        FOLATE NORMAL RANGE



NORMAL             GREATER THAN 5.4 NG/ML

INDETERMINATE               3.4-5.4 NG/ML

DEFICIENT             LESS THAN 3.4 NG/ML



 

                          11                        SEE SEPARATE REPORT



 

                          12                        SEE SEPARATE REPORT

Testing performed at reference lab . Report copy to follow

on a separate form. 21 REF LAB#:[id number]



 

                          13                        Status of Immunity          

           Anti-HBs Level

                                        ------------------                     -

-------------

Inconsistent with Immunity                   0.0 - 9.9

Consistent with Immunity                          >9.9



 

                          14                        Performed at:  [facility] [address]  [id number]

: Araceli B Reyes MD, Phone:  [phone]



 

                          15                        .

The QuantiFERON-TB Gold Plus result is determined by

subtracting the Nil value from either TB antigen (Ag) tube.

The mitogen tube serves as a control for the test.



 

                          16                        The specimen received for Qu

antiFERON testing was incubated

by the ordering institution. Specific procedures outlined

in our Directory of Services and in the package insert for

the QuantiFERON Gold (In Tube) test must be followed to

enable for proper stimulation of cells for the production

of interferon gamma. Chemiluminescence immunoassay

methodology

Performed at:  [address]  [id number]

: Araceli B Reyes MD, Phone:  [phone]



 

                          17                        SEE SEPARATE REPORT









Procedures





                Date            Code            Description     Status

 

                2021      41817           Office/Outpatient Established Mo

d MDM 30-39 Min Completed

 

                2021      53892           Office/Outpatient Established Lo

w MDM 20-29 Min Completed

 

                2021      91193           Office/Outpatient New Moderate M

DM 45-59 Minutes Completed







[employer] Devices





                                        Description

 

                                        No Information Available







Encounters





           Type       Date       Location   Provider   Dx         Diagnosis

 

                Office Visit    2021 11:40a [facility]

rubia Palomo M.D.           K50.812                   Crohn's disease of both smal

l and lg int w intestinal 

obst

 

                          Z93.2                     Ileostomy status

 

                Office Visit    2021  3:20p [facility]

rubia Palomo M.D.           K50.812                   Crohn's disease of both smal

l and lg int w intestinal 

obst

 

                          Z93.2                     Ileostomy status

 

             Office Visit 2021  2:50p [facility] Israel lees M.D. 

K50.812                                 Crohn's disease of both small and lg int

 w intestinal obst

 

                          Z93.2                     Ileostomy status







Assessments





                Date            Code            Description     Provider

 

                    2021          K50.812             Crohn's disease of b

oth small and large intestine with 

intestinal obstruction                  Israel Palomo M.D.

 

                2021      Z93.2           Ileostomy status Israel conner M.D.

 

                    2021          K50.812             Crohn's disease of b

oth small and large intestine with 

intestinal obstruction                  Israel Palomo M.D.

 

                2021      Z93.2           Ileostomy status Israel conner M.D.

 

                    2021          K50.812             Crohn's disease of b

oth small and large intestine with 

intestinal obstruction                  Israel Palomo M.D.

 

                2021      Z93.2           Ileostomy status Israel conner M.D.







Plan of Treatment

Future Appointment(s):* 2021 11:40 am - Israel Palomo M.D. at 
  [facility]





Functional Status





                                        Description

 

                                        No Information Available







Mental Status





                                        Description

 

                                        No Information Available







Referrals





                                        Description

 

                                        No Information Available

## 2021-10-28 NOTE — CCD
Summarization Of Episode

                             Created on: 10/28/2021



ALEX KINSEY

External Reference #: [id number]

: 1955

Sex: Undifferentiated



Demographics





                          Address                   [address] [place]  [address]

 

                          Home Phone                [phone]

 

                          Preferred Language        English

 

                          Marital Status            Unknown

 

                          Worship Affiliation     Unknown

 

                          Race                      Unknown

 

                          Ethnic Group              Not  or 





Author





                          Author                    [facility]

 

                          Organization              [facility]

 

                          Address                   Unknown

 

                          Phone                     Unavailable







Support





                Name            Relationship    Address         Phone

 

                RETIRED         Next Of Kin     Unknown         [phone]

 

                    TY KINSEY       Next Of Kin         [address] [place]  [address]                    [phone]

 

                RE              Next Of Kin     Unknown         Unavailable

 

                    [facility]                Next Of Kin         [address] [place]  [address]                    [phone]

 

                    CORA KINSEY SR   Next Of Kin         [address] [place]  [address]                    [phone]

 

                    Cora Kinsey      ECON                [address] [place]  [address]                    Unavailable







Care Team Providers





                    Care Team Member Name Role                Phone

 

                    LePine, M Olya RNP   Unavailable         Unavailable

 

                    LePine, M Olya RNP   Unavailable         Unavailable

 

                    LePine, M Olya RNP   Unavailable         Unavailable

 

                    LePine, M Olya RNP   Unavailable         Unavailable

 

                    LePine, M Olya RNP   Unavailable         Unavailable

 

                    LePine, M Olya RNP   Unavailable         Unavailable

 

                    LePine, M Olya RNP   Unavailable         Unavailable

 

                    LePine, M Olya RNP   Unavailable         Unavailable

 

                    LePine, M Olya RNP   Unavailable         Unavailable

 

                    LePine, M Olya RNP   Unavailable         Unavailable

 

                    LePine, M Olya RNP   Unavailable         Unavailable

 

                    LePine, M Olya RNP   Unavailable         Unavailable

 

                    LePine, M Olya RNP   Unavailable         Unavailable

 

                    LePine, M Olya RNP   Unavailable         Unavailable

 

                    LePine, M Olya RNP   Unavailable         Unavailable

 

                    LePine, M Olya RNP   Unavailable         Unavailable

 

                    LePine, M Olya RNP   Unavailable         Unavailable

 

                    LePine, M Olya RNP   Unavailable         Unavailable

 

                    LePine, M Olya RNP   Unavailable         Unavailable

 

                    LePine, M Olya RNP   Unavailable         Unavailable

 

                    LePine, M Olya RNP   Unavailable         Unavailable

 

                    LePine, M Olya RNP   Unavailable         Unavailable

 

                    LePine, M Olya RNP   Unavailable         Unavailable

 

                    LePine, M Olya RNP   Unavailable         Unavailable

 

                    LePine, M Olya RNP   Unavailable         Unavailable

 

                    LePine, M Olya RNP   Unavailable         Unavailable

 

                    LePine, M Olya RNP   Unavailable         Unavailable

 

                    LePine, M Olya RNP   Unavailable         Unavailable

 

                    LePine, M Olya RNP   Unavailable         Unavailable

 

                    LePine, M Olya RNP   Unavailable         Unavailable

 

                    LePine, M Olya RNP   Unavailable         Unavailable

 

                    LePine, M Olya RNP   Unavailable         Unavailable

 

                    LePine, M Olya RNP   Unavailable         Unavailable

 

                    LePine, M Olya RNP   Unavailable         Unavailable

 

                    LePine, M Olya RNP   Unavailable         Unavailable

 

                    LePine, M Olya RNP   Unavailable         Unavailable

 

                    LePine, M Olya RNP   Unavailable         Unavailable

 

                    LePine, M Olya RNP   Unavailable         Unavailable

 

                    LePine, M Olya RNP   Unavailable         Unavailable

 

                    LePine, M Olya RNP   Unavailable         Unavailable

 

                    LePine, M Olya RNP   Unavailable         Unavailable

 

                    LePine, M Olya RNP   Unavailable         Unavailable

 

                    LePine, M Olya RNP   Unavailable         Unavailable

 

                    LePine, M Olya RNP   Unavailable         Unavailable

 

                    LePine, M Olya RNP   Unavailable         Unavailable

 

                    LePine, M Olya RNP   Unavailable         Unavailable

 

                    LePine, M Olya RNP   Unavailable         Unavailable

 

                    LePine, M Olya RNP   Unavailable         Unavailable

 

                    LePine, M Olya RNP   Unavailable         Unavailable

 

                    LePine, M Olya RNP   Unavailable         Unavailable

 

                    LePine, M Olya RNP   Unavailable         Unavailable

 

                    LePine, M Olya RNP   Unavailable         Unavailable

 

                    LePine, M Olya RNP   Unavailable         Unavailable

 

                    LePine, M Olya RNP   Unavailable         Unavailable

 

                    LePine, M Olya RNP   Unavailable         Unavailable

 

                    LePine, M Olya RNP   Unavailable         Unavailable

 

                    Kobi Mack MD Unavailable         Unavailable

 

                    Kobi Mack MD Unavailable         Unavailable

 

                    Kobi Mack MD Unavailable         Unavailable

 

                    Kobi Mack MD Unavailable         Unavailable

 

                    Kobi Mack MD Unavailable         Unavailable

 

                    Kobi Mack MD Unavailable         Unavailable

 

                    Julien,  Miryam NP Unavailable         Unavailable

 

                    Julien,  Miryam NP Unavailable         Unavailable

 

                    Julien,  Miryam NP Unavailable         Unavailable

 

                    Julien,  Miryam NP Unavailable         Unavailable

 

                    Julien,  Miryam NP Unavailable         Unavailable

 

                    Julien,  Miryam NP Unavailable         Unavailable

 

                    Julien,  Miryam NP Unavailable         Unavailable

 

                    Julien,  Miryam NP Unavailable         Unavailable

 

                    Julien,  Miryam NP Unavailable         Unavailable

 

                    Julien,  Miryam NP Unavailable         Unavailable

 

                    Julien,  Miryam NP Unavailable         Unavailable

 

                    Julien,  Miryam NP Unavailable         Unavailable

 

                    Julien,  Miryam NP Unavailable         Unavailable

 

                    CASTRO PALOMO MD Unavailable         Unavailable

 

                    CASTRO PALOMO MD Unavailable         Unavailable

 

                    CASTRO PALOMO MD Unavailable         Unavailable

 

                    CASTRO PALOMO MD Unavailable         Unavailable

 

                    CASTRO PALOMO MD Unavailable         Unavailable

 

                    CASTRO PALOMO MD Unavailable         Unavailable

 

                    CASTRO PALOMO MD Unavailable         Unavailable

 

                    CASTRO PALOMO MD Unavailable         Unavailable

 

                    CASTRO PALOMO MD Unavailable         Unavailable

 

                    CASTRO PALOMO MD Unavailable         Unavailable

 

                    CASTRO PALOMO MD Unavailable         Unavailable

 

                    CASTRO PALOMO MD Unavailable         Unavailable

 

                    CHANDRALCASTRO ALCANTARA MD Unavailable         Unavailable

 

                    CHANDRALACASTRO MD Unavailable         Unavailable

 

                    CHANDRALA K KAYDEN TELLO Unavailable         Unavailable

 

                    CHANDRALA K KAYDEN TELLO Unavailable         Unavailable

 

                    CHANDRALA K KAYDEN MD Unavailable         Unavailable

 

                    CHANDRALA K KAYDEN MD Unavailable         Unavailable

 

                    CHANDRALA K KAYDEN MD Unavailable         Unavailable

 

                    CHANDRALA K KAYDEN MD Unavailable         Unavailable

 

                    CHANDRALA, K KAYDEN TELLO Unavailable         Unavailable

 

                    CHANDRALA K KAYDEN MD Unavailable         Unavailable

 

                    CHANDRALA K KAYDEN MD Unavailable         Unavailable

 

                    CHANDRALA K KAYDEN MD Unavailable         Unavailable

 

                    CHANDRALA, K KAYDEN MD Unavailable         Unavailable

 

                    CHANDRALA, K KAYDEN MD Unavailable         Unavailable

 

                    CHANDRALA, K KAYDEN MD Unavailable         Unavailable

 

                    CHANDRALA, K KAYDEN MD Unavailable         Unavailable

 

                    CHANDRALA, K KAYDEN MD Unavailable         Unavailable

 

                    CHANDRALA, K KAYDEN MD Unavailable         Unavailable

 

                    CHANDRALA, K KAYDEN MD Unavailable         Unavailable

 

                    CHANDRALA, K KAYDEN MD Unavailable         Unavailable

 

                    CHANDRALA K KAYDEN MD Unavailable         Unavailable



                                  



Re-disclosure Warning

          The records that you are about to access may contain information from 
federally-assisted alcohol or drug abuse programs. If such information is 
present, then the following federally mandated warning applies: This information
has been disclosed to you from records protected by federal confidentiality 
rules (42 CFR part 2). The federal rules prohibit you from making any further 
disclosure of this information unless further disclosure is expressly permitted 
by the written consent of the person to whom it pertains or as otherwise 
permitted by 42 CFR part 2. A general authorization for the release of medical 
or other information is NOT sufficient for this purpose. The Federal rules 
restrict any use of the information to criminally investigate or prosecute any 
alcohol or drug abuse patient.The records that you are about to access may 
contain highly sensitive health information, the redisclosure of which is 
protected by Article 27-F of the [place] Public Health law. If you 
continue you may have access to information: Regarding HIV / AIDS; Provided by 
facilities licensed or operated by the [facility] Office of Mental Health; 
or Provided by the [facility] Office for People With Developmental 
Disabilities. If such information is present, then the following [place] 
mandated warning applies: This information has been disclosed to you from 
confidential records which are protected by state law. State law prohibits you 
from making any further disclosure of this information without the specific 
written consent of the person to whom it pertains, or as otherwise permitted by 
law. Any unauthorized further disclosure in violation of state law may result in
a fine or [facility] sentence or both. A general authorization for the release of 
medical or other information is NOT sufficient authorization for further disc
losure.                                                                         
    



Encounters

          



           Encounter  Providers  Location   Date       Indications Data Source(s

)

 

                Unknown                         [address] [facility] [address] 10/20/2021 12:00:00 AM 

EDT                                                 eCW1 ([facility])

 

                Unknown                         [address] [facility] [address] 10/11/2021 12:00:00 AM 

EDT                                                 eCW1 ([facility])

 

                ([id number]) For Template [place]                 [address] [place] [address] 

10/05/2021 12:00:00 AM EDT                           eCW1 ([facility])

 

                Outpatient      Attender: Olya Navarrete  02:00:00 PM EDT

                                                    MEDENT ([facility]

)

 

                Outpatient      Attender: KAYDEN Hernandez/Solo/Ang

el/Reindl 2021 

11:40:00 AM EDT                                     MEDENT ([facility]

 

                Outpatient      Attender: Olya Navarrete  10:20:00 AM EDT

                                                    MEDENT ([facility]

)

 

                Outpatient      Attender: KAYDEN Hernandez/Solo/Ang

el/Reindl 2021 

11:40:00 AM EDT                                     MEDENT ([facility]

 

                          Outpatient                Attender: Kobi CRAWFORD

DAdmitter: Kobi Mack MDReferrer: 

Kobi Mack MD                     2021 12:00:00 AM EDT - 2021 

11:59:00 PM EDT 

Essential (hemorrhagic) thrombocythemia [facility]

 

                                        Essential (hemorrhagic) thrombocythemia 

 

             Outpatient   Referrer: Kobi Mack MD              2021 

08:41:00 AM EDT Essential 

(hemorrhagic) thrombocythemia           [facility]

 

                                        Essential (hemorrhagic) thrombocythemia 

 

                Outpatient      Attender: KAYDEN Hernandez/Solo/Ang

el/Reindl 2021 

03:20:00 PM EDT                                     MEDENT ([facility]

 

                Office Visit    Attender: Olya Navarrete  09:00:00 AM 

EDT                                                 MEDENT ([facility]

)

 

                Outpatient      Attender: Olya Navarrete  01:20:00 PM EST

                                                    MEDENT ([facility]

)

 

                Outpatient      Attender: KAYDEN Hernandez/Solo/Ang

el/Reindl 2021 

01:50:00 PM EST                                     MEDENT ([facility]

 

                Outpatient      Attender: Miryam etienne 2020 

04:00:00 PM EST                                     [facility]ENT ([facility]

 

                Outpatient      Attender: Olya Navarrete  09:40:00 AM EST

                                                    MEDENT ([facility]

)



                                                                                
                                                                                
                                                                  



Immunizations

          



             Vaccine      Date         Status       Description  Data Source(s)

 

                          Influenza, injectable, MDCK, preservative free, jonathan

valent 2021 02:28:00

PM EDT              completed                               [facility]

 

             COVID-19 VACCINE Moderna 2021 12:00:00 AM EST completed      

           [facility]

 

                                        Vaccine Series Complete: YESThis Data wa

s Submitted to [facility] [employer]. 

 

             COVID-19 VACCINE Moderna 2021 12:00:00 AM EST completed      

           [facility]

 

                                        Vaccine Series Complete: NOThis Data was

 Submitted to [facility] [employer]. 



                                                                                
                           



Medications

          



          Medication Brand Name Start Date Product Form Dose      Route     Admi

nistrative 

Instructions Pharmacy Instructions Status     Indications Reaction   Description

 Data 

Source(s)

 

       Administration Of Flu Vaccine        2021 12:00:00 AM EDT          

                          completed  

                                                            [facility] ([facility]

 

                                        Medication administered onsite 

 

        Alprazolam 0.25 MG Oral Tablet Alprazolam 2021 12:00:00 AM EDT    

                                     

active                                                          MEDENT ([facility])

 

                Zolpidem tartrate 5 MG Oral Tablet Zolpidem Tartrate 06/10/2021 

12:00:00 AM EDT 

                ORAL                    active                          MEDENT (

[facility])

 

        Prednisone 10 MG Oral Tablet Prednisone 2021 12:00:00 AM EDT      

                                   

completed                                                       MEDENT ([facility] )

 

                Estradiol 0.1 MG/ML Vaginal Cream [Estrace] Estrace         2021 12:00:00 AM EDT  

                                        active                          MEDENT (

[facility])

 

     Stelara Stelara 2021 12:00:00 AM EDT           SUBCUTANEOUS          

 completed                

MEDENT ([facility], )

 

                    1 ML ustekinumab 90 MG/ML Prefilled Syringe [Stelara] Stelar

a             2021 

12:00:00 AM EDT               SUBCUTANEOUS               active                 

     MEDENT ([facility], )

 

       Lutein 20 MG Oral Capsule Lutein 2021 12:00:00 AM EST              

 ORAL                 active 

                                                            [facility]

[facility])

 

                    Budesonide 3 MG Delayed Release Oral Capsule Budesonide     

     2021 12:00:00 AM 

EST                  ORAL                 completed                      MEDENT 

([facility], )

 

          vedolizumab 300 MG Injection [Entyvio] Entyvio   2021 12:00:00 A

M EST                               

                      completed                                   MEDENT ([facility])

 

       Covid-19 vaccine, Unspecified        2021 12:00:00 AM EST          

                          completed  

                                                            [facility] ([facility])

 

                                        Medication administered onsite 

 

       Covid-19 vaccine, Unspecified        2021 12:00:00 AM EST          

                          completed  

                                                            [facility])

 

                                        Medication administered onsite 

 

                                        NITROFURANTOIN, MACROCRYSTALS 25 MG / Ni

trofurantoin, Monohydrate 75 MG Oral 

Capsule Nitrofurantoin Monohyd Macro 2020 12:00:00 AM EST                 

ORAL                    

active                                                          MEDENT ([facility])

 

                          Phenazopyridine hydrochloride 200 MG Delayed Release O

ral Tablet Phenazopyridine

 HCL  2020 12:00:00 AM EST             ORAL              completed        

           MEDENT ([facility]

 

        Lisinopril 2.5 MG Oral Tablet Lisinopril 2020 12:00:00 AM EST     

            ORAL             

             active                                              MEDENT ([facility])

 

                    Triamcinolone Acetonide 1 MG/ML Topical Cream Triamcinolone 

Acetonide 2020

 12:00:00 AM EDT                                    completed                   

   MEDENT ([facility])



                                                                                
                                                                                
                                                          



Insurance Providers

          



             Payer name   Policy type / Coverage type Policy ID    Covered party

 ID Covered 

party's relationship to tompkins Policy [employer]             Plan Information

 

                              [id number]                               [id number]

 

          [employer]           [id number]           HU2       

          [id number]

 

                [facility]   Commercial      [id number]       [id number]

[id number]

                    Family Dependent                        [id number]

 

          [employer] [facility]           [id number]           HU2               

  [id number]

 

          [employer] [facility]           [id number]           [id number]               

  [id number]

 

             [facility] Commercial   [id number]      [id number]

[id number] Family 

Dependent                                           304/804

 

          [facility]           [id number]           HU2            

     [id number]

 

          EXCELLUS  C         [id number]           Spouse              [id number]

 

          MEDICARE  A         [id number]           Self                [id number]

[id number]

 

                Medic[employer] Medicare Primary [id number]     

[id number] Self                              

      [id number]

 

          MEDICARE            [id number]           SP                  [id number]

[id number]

 

          [facility] [employer]EE [employer]           [id number]           HU2       

          [id number]

 

          [facility] [employer]EE [employer]           [id number]           WI2       

          [id number]

 

          MEDICARE PART A             -O/P           [id number]           18   

               [id number]

 

          SELF PAY ONLY           [id number]           SP                  [id number]

[id number]

 

          [facility]         [id number] P              

     [id number]

 

          [facility]         [id number]              

     [id number]



                                                                                
                                                                                
                                                                                
        



Problems, Conditions, and Diagnoses

          



           Code       Display Name Description Problem Type Effective Dates Data

 Source(s)

 

                    D47.3               Essential (hemorrhagic) thrombocythemia 

Essential (hemorrhagic) 

thrombocythemia     Diagnosis           2021 03:11:00 PM EDT [facility]

 

           Z43.3      [id number]  Colostomy care Problem    2021 12:00:00 A

M EDT eCW1 

([facility])



                                                                                
                            



Surgeries/Procedures

          



             Procedure    Description  Date         Indications  Data Source(s)

 

             OFFICE OUTPATIENT VISIT 15 MINUTES              2021 12:00:00

 AM EDT              MED[facility] 

([facility])

 

             ECG ROUTINE ECG W/LEAST 12 LDS W/I&R              2021 12:00:

00 AM EDT              MED[facility] 

([facility])

 

             OFFICE OUTPATIENT VISIT 25 MINUTES              2021 12:00:00

 AM EDT              MEDENT 

([facility], )

 

             Chronic Care Management Services Ea Addl 20 Min              2021 12:00:00 AM EDT              

MED[facility] ([facility])

 

                    Chronic Care MGMT 20 Mins Clinical Staff Time Per Calendar M

ont                     2021 

12:00:00 AM EDT                                     [facility] ([facility]

)

 

             OFFICE OUTPATIENT VISIT 15 MINUTES              2021 12:00:00

 AM EDT              [facility] 

([facility])

 

             OFFICE OUTPATIENT VISIT 25 MINUTES              2021 12:00:00

 AM EDT              MED[facility] 

([facility], )

 

                    Chronic Care MGMT 20 Mins Clinical Staff Time Per Calendar M

[facility]                     2021 

12:00:00 AM EDT                                     [facility] ([facility]

)

 

                          RESPIRATORY PATHOGEN PANEL <td>RESPIRATORY PATHOGEN 

PANEL</td><td>Routine</td><td>2021 10:44 AM EDT</td><td></td><td>



Results for this procedure are in the results section.</td> 2021 10:44:00 

AM EDT                                              [facility]

 

                          COVID-19 PCR              <td>COVID-19 PCR</td><td>Rou

denzel</td><td>2021 10:44 AM 

EDT</td><td></td><td>



Results for this procedure are in the results section.</td> 2021 10:44:00 

AM EDT                                              [facility]

 

             Chronic Care Management Services Ea Addl 20 Min              2021 12:00:00 AM EDT              

MED[facility] ([facility])

 

                    Chronic Care MGMT 20 Mins Clinical Staff Time Per Calendar M

[facility]                     2021 

12:00:00 AM EDT                                     [facility] ([facility]

)

 

             OFFICE OUTPATIENT VISIT 15 MINUTES              2021 12:00:00

 AM EDT              MED[facility] 

([facility], )

 

             OFFICE OUTPATIENT VISIT 25 MINUTES              2021 12:00:00

 AM EDT              MED[facility] 

([facility])

 

                    Chronic Care MGMT 20 Mins Clinical Staff Time Per Calendar M

[facility]                     2021 

12:00:00 AM EDT                                     [facility] ([facility]

)

 

             OFFICE OUTPATIENT VISIT 15 MINUTES              2021 12:00:00

 AM EST              [facility] 

[facility]

 

             OFFICE OUTPATIENT NEW 45 MINUTES              2021 12:00:00 A

M EST              MEDENT 

([facility], )

 

             Diabetic Retinal Eye Exam              2021 12:00:00 AM EST  

            [facility]



                                                                                
                                                                                
                                                                                
                  



Results

          



                    ID                  Date                Data Source

 

                    [id number]           10/23/2021 11:55:00 AM EDT [facility]









          Name      Value     Range     Interpretation Code Description Data Ene

rce(s) Supporting 

Document(s)

 

                                        SARS-CoV-2 (COVID-19) RNA [Presence] in 

Respiratory specimen by [facility] with probe 

detection  Not Detected                                  [facility]      

 

                                        This lab was ordered by [facility] and reported by [employer] 









                    ID                  Date                Data Source

 

                    [id number]          10/23/2021 11:55:00 AM EDT [facility] ([facility]









          Name      Value     Range     Interpretation Code Description Data Ene

rce(s) Supporting 

Document(s)

 

           Coronavirus 2019 Nasopharygeal Laboratory test result                

                  [facility]                              

 

                                        ASSAY INFORMATION: Real Time RT-PCR

NOTE: The COVID-19 assay has been cleared by the U.S. Food and Drug

Administration under the Emergency Use Authorization (EUA). [employer] and [employer] are designated as high complexity laboratories by the

Clinical Laboratory Improvement Amendments of 1988(CLIA) and are qualified to

perform this test.



Not Detected



 









                    ID                  Date                Data Source

 

                    [id number]          2021 02:35:00 PM EDT [facility] ([facility])









          Name      Value     Range     Interpretation Code Description Data Ene

rce(s) Supporting 

Document(s)

 

                          C reactive protein [Mass/volume] in Serum or Plasma by

 High sensitivity method 

0.64 mg/dL   0.00-0.30                              [facility] ([facility]

)  









                    ID                  Date                Data Source

 

                    [id number]          2021 02:35:00 PM EDT [facility] ([facility])









          Name      Value     Range     Interpretation Code Description Data Ene

rce(s) Supporting 

Document(s)

 

          Vitamin B12 Level 598 pg/mL                               MED[facility] ([facility])  

 

                                        VITAMIN B12 NORMAL RANGE



NORMAL                     247 - 911 PG/ML

INDETERMINATE              211 - 246 PG/ML

DEFICIENT              LESS THAN 211 PG/ML

 

 

          Folate    Laboratory test result                               MED[facility] 

([facility])  

 

                                        FOLATE NORMAL RANGE



NORMAL             GREATER THAN 5.4 NG/ML

INDETERMINATE               3.4-5.4 NG/ML

DEFICIENT             LESS THAN 3.4 NG/ML

 









                    ID                  Date                Data Source

 

                    [id number]          2021 02:35:00 PM EDT [facility] ([facility])









          Name      Value     Range     Interpretation Code Description Data Ene

rce(s) Supporting 

Document(s)

 

          Iron (Fe) 33 ug/dL                          [facility]  

 

          Percent Saturation 7.8 %     13.2-45.0                     [facility] ([facility])  

 

          Total Iron Binding Capacity 424 ug/dL 250-450                       ME

D[facility] ([facility]) 

 









                    ID                  Date                Data Source

 

                    [id number]          2021 02:35:00 PM EDT [facility] ([facility])









          Name      Value     Range     Interpretation Code Description Data Ene

rce(s) Supporting 

Document(s)

 

          Glucose, Fasting 126 mg/dL                         [facility] ([facility])  

 

          Blood Urea Nitrogen 13 mg/dL  7-18                          [facility] ([facility])  

 

           Glomerular Filtration Rate Laboratory test result                    

              [facility]                              

 

                                        <content>Units are mL/min/1.73 

m2</content><br/><content></content><br/><content>Chronic Kidney Disease Staging
 per NKF:</content><br/><content></content><br/><content>Stage I & II   GFR >=60
       Normal to Mildly Decreased</content><br/><content>Stage III      GFR 30-
59      Moderately Decreased</content><br/><content>Stage IV       GFR 15-29    
  Severely Decreased</content><br/><content>Stage V        GFR <15        Very 
Little GFR Left</content><br/><content>ESRD           GFR <15 on 
RRT</content><br/><content></content> 

 

          Creatinine For GFR 0.84 mg/dL 0.55-1.30                     [facility] ([facility])  

 

          Sodium Level 140 meq/L 136-145                       [facility] ([facility]  

 

          Carbon Dioxide Level 28 meq/L  21-32                         [facility] ([facility])  

 

          Potassium Serum 4.4 meq/L 3.5-5.1                       [facility] ([facility])  

 

          Chloride Level 106 meq/L                         [facility] ([facility])  

 

          Anion Gap 6 meq/L   8-16                          [facility])  

 

          Calcium Level 9.6 mg/dL 8.8-10.2                      [facility] ([facility])  









                    ID                  Date                Data Source

 

                    [id number]          2021 02:35:00 PM EDT [facility] ([facility])









          Name      Value     Range     Interpretation Code Description Data Ene

rce(s) Supporting 

Document(s)

 

          Alt/SGPT  30 U/L    12-78                         [facility]

[facility])  

 

          Ast/Sgot  20 U/L    7-37                          [facility] ([facility])  

 

          Bilirubin,Total 0.4 mg/dL 0.2-1.0                       [facility] ([facility])  

 

          Bilirubin,Direct 0.1 mg/dL 0.0-0.2                       [facility] ([facility])  

 

          Alkaline Phosphatase 95 U/L                            [facility] ([facility])  

 

          Albumin   3.3 GM/DL 3.2-5.2                       [facility]

[facility])  

 

          Total Protein 6.9 GM/DL 6.4-8.2                       [facility] ([facility])  

 

          Albumin/Globulin Ratio 0.9       1.2-2.2                       [facility] 

([facility])  









                    ID                  Date                Data Source

 

                    [id number]          2021 02:35:00 PM EDT [facility] ([facility])









          Name      Value     Range     Interpretation Code Description Data Ene

rce(s) Supporting 

Document(s)

 

           Erythrocyte sedimentation rate by Westergren method 39 mm/hr   0-30  

                           [facility] 

([facility])                   









                    ID                  Date                Data Source

 

                    [id number]          2021 02:35:00 PM EDT [facility] ([facility])









          Name      Value     Range     Interpretation Code Description Data Ene

rce(s) Supporting 

Document(s)

 

          White Blood Count 10.4 10   4.0-10.0                      [facility] ([facility])  

 

          Red Blood Count 4.13 10   4.00-5.40                     [facility] ([facility])  

 

          Mean Corpuscular Volume 89.3 fl   80.0-96.0                     [facility]

 ([facility])  

 

          Hematocrit 36.9 %    36.0-47.0                     [facility] ([facility])  

 

          Hemoglobin 11.9 g/dL 12.0-15.5                     [facility] ([facility])  

 

          Red Cell Distribution Width 19.7 %    11.5-14.5                     [facility] ([facility])  

 

          Mean Corpuscular HGB Conc 32.2 g/dL 32.0-36.5                     MED[facility]

NT ([facility]) 

 

 

          Mean Corpuscular Hemoglobin 28.8 pg   27.0-33.0                     ME

DENT ([facility]) 

 

 

          Platelet Count, Automated 454 10    150-450                       MED[facility]

NT ([facility])  

 

          Lymph %   21.9 %    24.0-44.0                     [facility])  

 

          Neutrophils % 68.2 %    36.0-66.0                     [facility] ([facility])  

 

          Mono %    8.1 %     2.0-8.0                       [facility]  

 

          Baso %    0.6 %     0.0-1.0                       [facility]  

 

          Eos %     0.7 %     0.0-3.0                       [facility]  

 

          Nucleated Red Blood Cell % 0.0 %     0-0                           MED

ENT ([facility])  

 

          Immature Granulocyte % 0.5 %     0-3.0                         [facility] 

([facility])  

 

          Lymph #   2.3 10    1.5-5.0                       [facility]  

 

          Mono #    0.9 10    0.0-0.8                       [facility]  

 

          Neutrophils # 7.1 10    1.5-8.5                       [facility] ([facility])  

 

          Baso #    0.1 10    0.0-0.2                       [facility]  

 

          Eos #     0.1 10    0.0-0.5                       [facility]

[facility])  









                    ID                  Date                Data Source

 

                    [id number]         2021 02:35:00 PM EDT [facility])









          Name      Value     Range     Interpretation Code Description Data Ene

rce(s) Supporting 

Document(s)

 

           Percent Saturation 7.8 %      13.2-45.0  Below low normal            

[facility] ([facility])                            

 

           Iron (Fe)  33 ug/dL        Below low normal            [facility] (

[facility])                                     

 

                Total Iron Binding Capacity 424 ug/dL       250-450         Norm

al (applies to non-numeric 

results)                                [facility]) 

 









                    ID                  Date                Data Source

 

                    [id number]         2021 02:35:00 PM EDT [facility] ([facility])









          Name      Value     Range     Interpretation Code Description Data Ene

rce(s) Supporting 

Document(s)

 

           Vitamin B12 Level 598 pg/mL             Normal (applies to non-numeri

c results)            [facility] 

([facility])         

 

                                        VITAMIN B12 NORMAL RANGE



NORMAL                     247 - 911 PG/ML

INDETERMINATE              211 - 246 PG/ML

DEFICIENT              LESS THAN 211 PG/ML

 

 

           Folate     Laboratory test result            Normal (applies to non-n

umeric results)            [facility])        

 

                                        FOLATE NORMAL RANGE



NORMAL             GREATER THAN 5.4 NG/ML

INDETERMINATE               3.4-5.4 NG/ML

DEFICIENT             LESS THAN 3.4 NG/ML

 









                    ID                  Date                Data Source

 

                    [id number]         2021 02:35:00 PM EDT [facility])









          Name      Value     Range     Interpretation Code Description Data Ene

rce(s) Supporting 

Document(s)

 

           Alkaline Phosphatase 95 U/L          Normal (applies to non-num

faby results)            

[facility] ([facility], )  

 

           Ast/Sgot   20 U/L     7-37       Normal (applies to non-numeric resul

ts)            [facility] ([facility])                   

 

           Alt/SGPT   30 U/L     12-78      Normal (applies to non-numeric resul

ts)            [facility])         

 

           Bilirubin,Total 0.4 mg/dL  0.2-1.0    Normal (applies to non-numeric 

results)            

[facility] ([facility])  

 

           Bilirubin,Direct 0.1 mg/dL  0.0-0.2    Normal (applies to non-numeric

 results)            

[facility])  

 

           Total Protein 6.9 GM/DL  6.4-8.2    Normal (applies to non-numeric re

sults)            [facility])        

 

           Albumin/Globulin Ratio 0.9        1.2-2.2    Below low normal        

    [facility] ([facility])                           

 

           Albumin    3.3 GM/DL  3.2-5.2    Normal (applies to non-numeric resul

ts)            [facility])         









                    ID                  Date                Data Source

 

                    [id number]         2021 02:35:00 PM EDT [facility] ([facility])









          Name      Value     Range     Interpretation Code Description Data Ene

rce(s) Supporting 

Document(s)

 

           Glucose, Fasting 126 mg/dL       Above high normal            M

[facility] ([facility])                            

 

           Blood Urea Nitrogen 13 mg/dL   7-18       Normal (applies to non-nume

rock results)            

[facility] ([facility])  

 

             Creatinine For GFR 0.84 mg/dL   0.55-1.30    Normal (applies to non

-numeric results) 

                          [facility] ([facility])  

 

                Glomerular Filtration Rate Laboratory test result               

  Normal (applies to non-

numeric results)                        [facility]) 

 

 

                                        <content>Units are mL/min/1.73 

m2</content><br/><content></content><br/><content>Chronic Kidney Disease Staging
 per NKF:</content><br/><content></content><br/><content>Stage I & II   GFR >=60
       Normal to Mildly Decreased</content><br/><content>Stage III      GFR 30-
59      Moderately Decreased</content><br/><content>Stage IV       GFR 15-29    
  Severely Decreased</content><br/><content>Stage V        GFR <15        Very 
Little GFR Left</content><br/><content>ESRD           GFR <15 on 
RRT</content><br/><content></content> 

 

           Sodium Level 140 meq/L  136-145    Normal (applies to non-numeric res

ults)            [facility] 

([facility])         

 

           Potassium Serum 4.4 meq/L  3.5-5.1    Normal (applies to non-numeric 

results)            

[facility] ([facility])  

 

           Carbon Dioxide Level 28 meq/L   21-32      Normal (applies to non-num

faby results)            

[facility] ([facility])  

 

           Chloride Level 106 meq/L       Normal (applies to non-numeric r

esults)            [facility]

 ([facility])        

 

           Calcium Level 9.6 mg/dL  8.8-10.2   Normal (applies to non-numeric re

sults)            

[facility] ([facility])  

 

           Anion Gap  6 meq/L    8-16       Below low normal            [facility] (

[facility])                                      









                    ID                  Date                Data Source

 

                    [id number]         2021 02:35:00 PM EDT [facility]









          Name      Value     Range     Interpretation Code Description Data Ene

rce(s) Supporting 

Document(s)

 

                          C reactive protein [Mass/volume] in Serum or Plasma by

 High sensitivity method 

0.64 mg/dL      0.00-0.30       Above high normal                 [facility] ([facility])

                                         

 

                Erythrocyte sedimentation rate by Westergren method 39 mm/hr    

    0-30            Above high 

normal                                  [facility] 

 









                    ID                  Date                Data Source

 

                    [id number]         2021 02:35:00 PM EDT [facility] ([facility])









          Name      Value     Range     Interpretation Code Description Data Ene

rce(s) Supporting 

Document(s)

 

           White Blood Count 10.4 10    4.0-10.0   Above high normal            

[facility] ([facility])                           

 

           Hemoglobin 11.9 g/dL  12.0-15.5  Below low normal            [facility] (

[facility])                            

 

           Red Blood Count 4.13 10    4.00-5.40  Normal (applies to non-numeric 

results)            

[facility] ([facility])  

 

           Hematocrit 36.9 %     36.0-47.0  Normal (applies to non-numeric resul

ts)            [facility]         

 

                Mean Corpuscular Hemoglobin 28.8 pg         27.0-33.0       Norm

al (applies to non-numeric 

results)                                [facility] ([facility]) 

 

 

                Mean Corpuscular Volume 89.3 fl         80.0-96.0       Normal (

applies to non-numeric 

results)                                [facility] ([facility]) 

 

 

                Mean Corpuscular HGB Conc 32.2 g/dL       32.0-36.5       Normal

 (applies to non-numeric 

results)                                [facility] ([facility]) 

 

 

           Platelet Count, Automated 454 10     150-450    Above high normal    

        [facility]                    

 

           Red Cell Distribution Width 19.7 %     11.5-14.5  Above high normal  

          [facility] 

([facility])         

 

           Neutrophils % 68.2 %     36.0-66.0  Above high normal            MED[facility]

NT ([facility])                            

 

           Eos %      0.7 %      0.0-3.0    Normal (applies to non-numeric resul

ts)            [facility] ([facility])                    

 

           Mono %     8.1 %      2.0-8.0    Above high normal            [facility] 

([facility])

                                         

 

           Lymph %    21.9 %     24.0-44.0  Below low normal            [facility] (

[facility])                                      

 

           Baso %     0.6 %      0.0-1.0    Normal (applies to non-numeric resul

ts)            [facility] ([facility])                   

 

           Nucleated Red Blood Cell % 0.0 %      0-0        Normal (applies to n

on-numeric results)            

[facility]  

 

           Immature Granulocyte % 0.5 %      0-3.0      Normal (applies to non-n

umeric results)            

[facility]  

 

           Neutrophils # 7.1 10     1.5-8.5    Normal (applies to non-numeric re

sults)            [facility] 

([facility], )         

 

           Lymph #    2.3 10     1.5-5.0    Normal (applies to non-numeric resul

ts)            [facility] 

([facility])         

 

           Mono #     0.9 10     0.0-0.8    Above high normal            [facility] 

([facility])                                      

 

           Baso #     0.1 10     0.0-0.2    Normal (applies to non-numeric resul

ts)            [facility] 

([facility])         

 

           Eos #      0.1 10     0.0-0.5    Normal (applies to non-numeric resul

ts)            [facility] ([facility])                   









                    ID                  Date                Data Source

 

                    [id number]          2021 02:10:00 PM EDT [facility] ([facility])









          Name      Value     Range     Interpretation Code Description Data Ene

rce(s) Supporting 

Document(s)

 

           Elastase.pancreatic [Mass/mass] in Stool Laboratory test result      

                            [facility] 

([facility])                   

 

                                        Test not performed. Consistency of stool

 specimen too watery

for analysis.

TEST: [id number] Pancreatic Elastase, Fecal



Testing performed at reference lab . Report copy to follow

on a separate form. 21 REF LAB#:[id number]

 

 

           Calprotectin [Mass/mass] in Stool 40 ug/g    0-120                   

         [facility] ([facility])                              

 

                                        <content>Concentration     Interpretatio

n   Follow-Up</content><br/><content><16

 - 50 ug/g     Normal           None</content><br/><content>>50 -120 ug/g     
Borderline       Re-evaluate in 4-6 weeks</content><br/><content>>120 ug/g     
Abnormal         Repeat as 
clinically</content><br/><content>indicated</content><br/><content></content> 









                    ID                  Date                Data Source

 

                    [id number]          2021 02:10:00 PM [facility]









          Name      Value     Range     Interpretation Code Description Data Ene

rce(s) Supporting 

Document(s)

 

          Fats Neutral Laboratory test result                               [facility]

NT ([facility])  

 

                                        <content>Normal (<60 Droplets/HPF)</cont

ent><br/><content></content> 

 

          Fats Total Laboratory test result                               [facility]

 ([facility])  

 

                                        <content>Normal (<100 Droplets/HPF)</con

tent><br/><content></content> 









                    ID                  Date                Data Source

 

                    [id number]         2021 02:10:00 PM EDT [facility]









          Name      Value     Range     Interpretation Code Description Data Ene

rce(s) Supporting 

Document(s)

 

                Elastase.pancreatic [Mass/mass] in Stool Laboratory test result 

                Normal 

(applies to non-numeric results)                     [facility] ([facility])  

 

                                        Test not performed. Consistency of stool

 specimen too watery

for analysis.

TEST: [id number] Pancreatic Elastase, Fecal



Testing performed at reference lab . Report copy to follow

on a separate form. 21 REF LAB#:[id number]

 









                    ID                  Date                Data Source

 

                    [id number]         2021 02:10:00 PM EDT [facility] ([facility])









          Name      Value     Range     Interpretation Code Description Data Ene

rce(s) Supporting 

Document(s)

 

           Fats Total Laboratory test result            Normal (applies to non-n

umeric results)            

[facility] ([facility])  

 

                                        <content>Normal (<100 Droplets/HPF)</con

tent><br/><content></content> 

 

           Fats Neutral Laboratory test result            Normal (applies to non

-numeric results)            

[facility]  

 

                                        <content>Normal (<60 Droplets/HPF)</cont

ent><br/><content></content> 









                    ID                  Date                Data Source

 

                    [id number]         2021 02:10:00 PM EDT [facility]









          Name      Value     Range     Interpretation Code Description Data Ene

rce(s) Supporting 

Document(s)

 

                Calprotectin [Mass/mass] in Stool 40 ug/g         0-120         

  Normal (applies to non-numeric 

results)                                [facility] ([facility]) 

 

 

                                        <content>Concentration     Interpretatio

n   Follow-Up</content><br/><content><16

 - 50 ug/g     Normal           None</content><br/><content>>50 -120 ug/g     
Borderline       Re-evaluate in 4-6 weeks</content><br/><content>>120 ug/g     
Abnormal         Repeat as 
clinically</content><br/><content>indicated</content><br/><content></content> 









                    ID                  Date                Data Source

 

                    [id number]              2021 07:06:47 AM EDT [facility]

 

                                        Service Cmnt XXX-Imp : NoneRespiratory P

CR Panel : PCR ResultsMicroorganism XXX 

Cult : Entry ErrorHAdV DNA QI SENA+non-probe : Entry ErrorHCoV [id number] Nph QI 
SENA+non-probe : Entry ErrorHCoV [id number] Nph QI SENA+non-probe : Entry 
[id number] RNA Nph QI SENA+non-probe : Entry [id number] RNA Upper resp QI 
SENA+probe : Entry ErrorhMPV RNA Nph QINAA+non-probe : Entry ErrorRV+EV RNA Nph 
QI ESNA+non-probe : Entry ErrorFLUAV RNA Nph QI SENA+ non-probe : Entry ErrorFLUBV
 RNA Nph QI SENA+non-probe : Entry ErrorHPIV1 RNA NphQINAA+non-probe : Entry 
ErrorHPIV2 RNA Nph QINAA+non-probe : Entry ErrorHPVI3 RNA Nph SENA+non-probe : 
Entry ErrorHPIV4 RNA Nph Q SENA+non-probe : Entry ErrorRSV RNA Nph Q SENA+non-
probe : Entry ErrorB pert.PT PrmtNph Q SENA+non-probe : Entry ErrorC pneum DNA 
Nph Q SENA+non-probe : Entry ErrorM pneum DNA Nph Q SENA+non-probe : Entry ErrorB 
[id number] DNA Nph SENA+non-probe : Entry ErrorPlease note : Test Not Performed. 









          Name      Value     Range     Interpretation Code Description Data [facility]

rce(s) Supporting 

Document(s)

 

                                                                       









                    ID                  Date                Data Source

 

                    [id number]              2021 04:20:22 PM EDT [facility]









          Name      Value     Range     Interpretation Code Description Data [facility]

rce(s) Supporting 

Document(s)

 

           Specimen source [Identifier] of Unspecified specimen                 

                            [facility]                                 

 

           SARS-CoV-2 RNA            2019 nCoV Real-Time RT-PCR: NOT DETECTED [facility]                                

 

          Assay Performed                                         [facility]  

 

                                        Influenza virus A RNA [Presence] in Naso

pharynx by Target amplification with 

non-probe based detection            Not Detected A                     [facility]  

 

                                        Influenza virus B RNA [Presence] in Naso

pharynx by Target amplification with 

non-probe based detection            Not Detected A                     [facility]  

 

                                        Respiratory syncytial virus RNA [Presenc

e] in Nasopharynx by Target 

amplification with non-probe based detection              Not Detected A        

                 [facility]                      

 

          Patients first test for [facility]  

 

          Patient employed in healthcare setting                                

         [facility]  

 

          Patient has symptoms related to condition                             

            [facility]  

 

           When did you start to experience these symptoms [Date and time] [Phen

X]                                             

[facility]              

 

           Patient was hospitalized because of this condition                   

                          [facility]                                 

 

          patient was admitted to ICU for condition                             

            [facility]  

 

           Patient resides in a congregate care setting                         

                    [facility]

                                         

 

          Pregnancy status                                         [facility]  









                    ID                  Date                Data Source

 

                    [id number]          2021 03:22:00 PM EDT [facility] ([facility])









          Name      Value     Range     Interpretation Code Description Data Ene

rce(s) Supporting 

Document(s)

 

          Glucose, Fasting 121 mg/dL                         [facility] ([facility])  

 

          Blood Urea Nitrogen 27 mg/dL  7-18                          [facility] ([facility])  

 

          Creatinine For GFR 1.00 mg/dL 0.55-1.30                     [facility] ([facility])  

 

          Glomerular Filtration Rate 58.9                                    MED

[facility] ([facility])  

 

                                        <content>Units are mL/min/1.73 

m2</content><br/><content></content><br/><content>Chronic Kidney Disease Staging
 per NKF:</content><br/><content></content><br/><content>Stage I & II   GFR >=60
       Normal to Mildly Decreased</content><br/><content>Stage III      GFR 30-
59      Moderately Decreased</content><br/><content>Stage IV       GFR 15-29    
  Severely Decreased</content><br/><content>Stage V        GFR <15        Very 
Little GFR Left</content><br/><content>ESRD           GFR <15 on 
RRT</content><br/><content></content> 

 

          Sodium Level 137 meq/L 136-145                       [facility] ([facility])  

 

          Potassium Serum 4.2 meq/L 3.5-5.1                       [facility] ([facility])  

 

          Chloride Level 106 meq/L                         [facility] ([facility])  

 

          Anion Gap 6 meq/L   8-16                          [facility] ([facility])  

 

          Carbon Dioxide Level 25 meq/L  21-32                         [facility] ([facility])  

 

          Calcium Level 8.9 mg/dL 8.8-10.2                      [facility] ([facility])  

 

          Ast/Sgot  26 U/L    7-37                          [facility] ([facility])  

 

          Alt/SGPT  40 U/L    12-78                         [facility] ([facility])  

 

          Alkaline Phosphatase 79 U/L                            [facility] ([facility])  

 

          Bilirubin,Total 0.5 mg/dL 0.2-1.0                       [facility] ([facility])  

 

          Total Protein 7.5 GM/DL 6.4-8.2                       [facility] ([facility])  

 

          Albumin   3.4 GM/DL 3.2-5.2                       [facility] ([facility])  

 

          Albumin/Globulin Ratio 0.8       1.2-2.2                       [facility] 

([facility])  









                    ID                  Date                Data Source

 

                    [id number]          2021 03:22:00 PM EDT [facility] ([facility])









          Name      Value     Range     Interpretation Code Description Data Ene

rce(s) Supporting 

Document(s)

 

          Iron (Fe) 139 ug/dL                         [facility] ([facility])  

 

          Percent Saturation 28.1 %    13.2-45.0                     [facility]  

 

          Total Iron Binding Capacity 494 ug/dL 250-450                       ME

DENT ([facility]) 

 









                    ID                  Date                Data Source

 

                    [id number]          2021 03:22:00 PM EDT [facility] ([facility])









          Name      Value     Range     Interpretation Code Description Data Ene

rce(s) Supporting 

Document(s)

 

           Ferritin [Mass/volume] in Serum or Plasma 19 ng/mL   8-252           

                 [facility] ([facility]                              

 

           Cobalamin (Vitamin B12) [Mass/volume] in Serum or Plasma 580 pg/mL  2

                          

[facility] ([facility])            

 

                                        VITAMIN B12 NORMAL RANGE



NORMAL                     247 - 911 PG/ML

INDETERMINATE              211 - 246 PG/ML

DEFICIENT              LESS THAN 211 PG/ML

 









                    ID                  Date                Data Source

 

                    [id number]          2021 03:22:00 PM EDT [facility] ([facility])









          Name      Value     Range     Interpretation Code Description Data Ene

rce(s) Supporting 

Document(s)

 

           BCR/Abl Screen For CML Path So Laboratory test result                

                  [facility] ([facility])                              

 

                                        See Pathology Report

Specimen Collection for Pathology Reference Lab Testing.

Refer to [facility] Pathology Report for Results:[id number]

 









                    ID                  Date                Data Source

 

                    [id number]          2021 03:22:00 PM EDT [facility] ([facility])









          Name      Value     Range     Interpretation Code Description Data Ene

rce(s) Supporting 

Document(s)

 

                                        JAK2 gene.p.Jxz245Sns mutant/Normal in B

lood or Tissue by Molecular genetics 

method     Laboratory test result                                  [facility]  

 

                                        See Pathology Report

Specimen Collection for Pathology Reference Lab Testing.

Refer to [facility] Pathology Report for Results: [id number]

 









                    ID                  Date                Data Source

 

                    [id number]          2021 03:22:00 PM EDT [facility] ([facility])









          Name      Value     Range     Interpretation Code Description Data Ene

rce(s) Supporting 

Document(s)

 

          White Blood Count 15.0 10   4.0-10.0                      [facility] ([facility])  

 

          Red Blood Count 4.63 10   4.00-5.40                     [facility] ([facility])  

 

          Hemoglobin 12.9 g/dL 12.0-15.5                     [facility]  

 

          Hematocrit 39.2 %    36.0-47.0                     [facility]  

 

          Mean Corpuscular Volume 84.7 fl   80.0-96.0                     [facility]  

 

          Mean Corpuscular HGB Conc 32.9 g/dL 32.0-36.5                     [facility]

[facility] ([facility]) 

 

 

          Mean Corpuscular Hemoglobin 27.9 pg   27.0-33.0                     [facility]

[facility] ([facility]) 

 

 

          Red Cell Distribution Width 19.1 %    11.5-14.5                     [facility]

[facility] ([facility])  

 

          Platelet Count, Automated 319 10    150-450                       [facility]

NT ([facility])  

 

          Neutrophils % 92.5 %    36.0-66.0                     [facility]  

 

          Mono %    0.7 %     2.0-8.0                       [facility]  

 

          Lymph %   5.4 %     24.0-44.0                     [facility]  

 

          Eos %     0.0 %     0.0-3.0                       [facility]  

 

          Immature Granulocyte % 1.3 %     0-3.0                         [facility] 

([facility])  

 

          Baso %    0.1 %     0.0-1.0                       [facility]  

 

          Neutrophils # 13.9 10   1.5-8.5                       [facility]  

 

          Nucleated Red Blood Cell % 0.0 %     0-0                           MED

ENT ([facility])  

 

          Mono #    0.1 10    0.0-0.8                       [facility]  

 

          Eos #     0.0 10    0.0-0.5                       [facility]  

 

          Lymph #   0.8 10    1.5-5.0                       [facility]  

 

          Baso #    0.0 10    0.0-0.2                       [facility]  









                    ID                  Date                Data Source

 

                    [id number]           2021 04:08:00 PM [facility]

 

                                        Molecular Diagnostics ReportName: ALEX KINSEYMRN: [id number] Number: [id number]

[id number] Date: 2021 00:00Received Date: 2021 14:47Physician(s): 
KOBI MACK MD HAGHIR, SHAHANDEH F,[facility] ReceivedA: Peripheral 
Blood-[facility] for CALR/MPLTYPE OF STUDY:  CALR/MPL Mutation AnalysisCOMMENTS:     
      A peripheral blood sample for this patient was sent to 
[employer], [address], [place], [place] [address]xve
 analysis. Please see order in EPIC under Labs tab for scanned 
externalreport.The report states in part:CALR: "No mutation is detected in exon 
9 of CALR. Insertions up to 30bpand deletions up to 52bp have been successfully 
detected by the assay."MPL: "No mutation is detected in exon 10 of MPL, 
encompassing codons 505and 515."            See report for final results and 
interpretation. sr/js  Electronically Signed By Kingsley Phipps M.D. Attending 
Pathologist 2021 16:08:07  









          Name      Value     Range     Interpretation Code Description Data [facility] 

Document(s)

 

                                                                       









                    ID                  Date                Data Source

 

                    [id number]             2021 07:29:00 PM EDT [facility]

tics

 

                                        FASTING: UNKNOWNReceived: 2021 at 

03:19:00     AMD: [facility] [facility], [address], [place], [place], 
, Paul Webber M.D.,PhDsee noteD47.3Peripheral BloodNot given 

 

                                        Received: 2021 at 03:19:00     AMD

: [employer] [facility], [address] [address] , [place], [place], , Paul Webber M.D.,PhDsee noteD47.3Peripheral BloodNot given 









          Name      Value     Range     Interpretation Code Description Data [facility] Supporting 

Document(s)

 

                                        CALR gene exon 9 mutations found [Identi

fier] in Blood or Tissue by Molecular 

genetics method Nominal            Not Detected                       [facility]

[facility]  

 

                                        DNRDNRDNRDNRDNRDNRDNRsee noteNo mutation

 is detected in exon 9 of CALR. 

Insertions up to 30bp anddeletions up to 52bp have been successfully detected by
 the assay.The test results were reviewed by: Cheli Haley MD, FCAP, Medical 
Director, Molecular OncologySEE BELOWThis PCR-based advanced sequencing assay 
interrogatesDNA from leukocytes for the presence of mutations inexon 9 of 
calreticulin (CALR). The sensitivity ofmutation detection is approximately 5% 
but may varydepending on the particular mutation type. Insertionsup to 30bp and 
deletions up to 52bp have beensuccessfully detected by the assay. Alterations 
outsideof the tested areas of this gene will not be detected.Synonymous or known
 non-synonymous polymorphic changes(SNPs) are not reported. Frameshift mutations
 in thisregion of CALR are associated with myeloproliferativeneoplasms (MPNs), 
particularly essentialthrombocythemia (ET) and primary myelofibrosis 
(PMF).Results of this assay should be correlated withmorphology and other labora
tory testing for finaldiagnosis and classification. If this test is 
negativeadditional testing that may be useful for workup ofMPNs, depending on 
presenting hematologic features,includes BCR-ABL1 rearrangement (test code 16685
 [id number]) or mutational analysis of JAK2 V617F(polycythemia vera (PV)/ET/PMF, 
52068), JAK2 exon 12(PV, 01668), MPL (ET/PMF, 99140) or CSF3R 
(chronicneutrophilic leukemia, 94558). Residual material fromthis sample may be 
used except for BCR-ABL1 testing;call lab to add.DNA was aligned to GRCh37(hg19)
 for analysis andtranscript ID [id number] was used as reference forGIANCARLOR se mercedes.For additional information, please refer 
tohttp://education.[employer]/faq/CZF536(This link is being provided 
forinformational/educational purposes only.)This test was developed and its a
nalyticalperformance characteristics have been determinedby [employer] 
[facility], [place], [place].It has not been cleared or approved by the FDA.
 Thisassay has been validated pursuant to the CLIAregulations and is used for 
clinical purposes. 









                    ID                  Date                Data Source

 

                    [id number]             2021 07:29:00 PM EDT [facility]

tics

 

                                        FASTING: UNKNOWNReceived: 2021 at 

03:19:00     AMD: [employer] [facility], [address], [place], [place], 
, Paul Webber M.D.,PhDsee noteD47.3Peripheral BloodNot given 

 

                                        Received: 2021 at 03:19:00     AMD

: [employer] [facility], [address], [place], [place], , Paul Webber M.D.,PhDsee noteD47.3Peripheral BloodNot given 









          Name      Value     Range     Interpretation Code Description Data Ene

rce(s) Supporting 

Document(s)

 

                                        MPL gene p.Foj427Zyz+Cgb011Zfg+Djl000Iqs

 [Presence] in Blood or Tissue by 

Molecular genetics method            Not Detected                       [facility] [facility]

agnostics  

 

                                        DNRDNRDNRDNRDNRDNRDNRsee noteNo mutation

 is detected in exon 10 of MPL, 

encompassing codons 505 kcc729.The test results were reviewed by: Cheli Haley MD, FCAP, Medical Director, Molecular OncologySEE BELOWThis PCR-based 
advanced sequencing assay interrogatesDNA from leukocytes for the presence of 
mutations inexon 10 of the thrombopoietin receptor (MPL), includingcodons 505 
and 515. The sensitivity of mutationdetection is 5% but may vary depending on 
theparticular mutation type. Insertions up to 30bp anddeletions up to 52bp have 
been successfully detectedby the assay. Alterations outside of the testedareas 
of this gene will not be detected. Synonymous orknown non-synonymous polymorphic
 changes (SNPs) are notreported. Mutations at these sites in MPL areassociated 
with myeloproliferative neoplasms (MPNs),particularly essential thrombocythemia 
(ET) and primarymyelofibrosis (PMF). Results of this assay should becorrelated 
with morphology and other laboratory testingfor final diagnosis and classif
ication. If this test isnegative, additional testing that may be useful 
forworkup of MPNs, depending on presenting hematologicfeatures, includes BCR-
ABL1 rearrangement (test [id number] or 42193I) or mutational analysis of JAK2 
V617F(polycythemia vera (PV)/ET/PMF, 97716), CALR (ET/PMF,73574), JAK2 exon 12 
(PV, 07724) or CSF3R (chronicneutrophilic leukemia, 60159). Residual material 
fromthis sample may be used except for BCR-ABL1 testing;call lab to add.DNA was 
aligned to GRCh37(hg19) for analysis andand transcript ID [id number] was 
used forreference for MPL sequence.For additional information, please refer 
tohttp://education.[employer].BehavioSec/faq/JLZ388(This link is being provided 
forinformational/educational purposes only.)This test was developed and its 
analyticalperformance characteristics have been determinedby [facility] 
[facility], [place], [place].It has not been cleared or approved by the FDA.
 Thisassay has been validated pursuant to the CLIAregulations and is used for 
clinical purposes. 









                    ID                  Date                Data Source

 

                    [id number]            2021 04:42:00 PM [facility]

 

                                        Cytogenetics ReportName: ALEX KINSEY LELE

RN: [id number] Number: [id number]

794Collection Date: 2021 00:00Received Date: 2021 15:17Physician(s): 
KOBI MACK,KOBI CROWELL,MDSpecimen(s) ReceivedA: Peripheral 
Blood (U-IF)Clinical HistoryPatient with chronic thrombocytosis since  being
 evaluated for theBCR-ABL1 rearrangement.TEST REQUESTED/PERFORMED:  Fluorescence
 in situ hybridization (FISH)   DiagnosisThe FISH study was negative for the 
BCR/ABL1 [t(9;22)] rearrangement. Electronically Signed By Bushra Coats, 
Ph.D., [facility]  2021 16:42:48Gross DescriptionFluorescence in situ 
Hybridization (FISH)nuc delaney (ABL1,BCR)x2[194/200]DescriptionThe fluorescence in 
situ hybridization (FISH) analysis showed a normalsignal pattern for BCR and 
ABL1, providing no evidence of a t(9;22).
________________________________________________________________________________

_____________Test DataSpecimen Processed:   Peripheral BloodFluorescence in situ
 Hybridization (FISH):  24 HR Unstimulated      Probe Normal Cut-off Nuclei  
Analyzed FISH SIGNAL PATTERNS         Normal Abnormal NKCS     *LSI ABL1(9q34.1)
 SO LSI BCR(22q11.2) SG  ES 3% 200 2O2% 0% 3% Vendor:  *[employer] 
[employer]  Probes:  LSI: Locus Specific Probe;  ES: Extra Signal;  DCDF: Dual 
ColorDual Fusion Fluorochromes/ Signals:  SG: Spectrum Green;  SO: Spectrum 
Orange;  Y:Yellow (Fusion) NKCS:  Signals with No Known Clinical 
SignificanceDisclaimer: Conventional chromosome analysis may not detect 
submicroscopicstructural chromosome abnormalities as well as aberrations present
 at lowpercentages. The FISH test was developed and its performance was 
validatedby the Cytogenetics section of the Department of Clinical Pathology. 
Thistest has not been cleared or approved by the U. S. Food and 
DrugAdministration (FDA).  The FDA has determined that such approval is 
notnecessary.  However, the procedure is considered investigational, andshould 
not be used as the sole criteria for diagnosis.   









          Name      Value     Range     Interpretation Code Description Data Ene

rce(s) Supporting 

Document(s)

 

                                                                       









                    ID                  Date                Data Source

 

                    [id number]           2021 04:32:00 PM [facility]

 

                                        Molecular Diagnostics ReportName: ALEX KINSEYMRN: [id number] Number: [id number]

1199Collection Date: 2021 00:00Received Date: 2021 08:18Physician(s): 
KOBI MACK MD Haghir, Shahandeh MDSpecimen(s) ReceivedA: Peripheral 
Blood - [id number] OF STUDY:  JAK2 V617F Direct Mutation AnalysisSPECIMEN TYPE:   
  Peripheral BloodRESULTS:   NegativeINTERPRETATION: A negative result does not 
entirely rule out thepossibility of myeloproliferative neoplasm, as 5% of 
polycythemia verapatients and 40-50% of primary myelofibrosis and essential 
thrombocythemiapatients do not harbor a JAK2 mutation. Clinical correlation 
isrecommended. Testing for JAK2 exon 12, MPL, and/or CALR mutations may alsobe 
helpful if clinically indicated.COMMENTS: Extracted DNA was amplified with 
primers specific for detectingthe JAK2 V617F (c.1849 G>T) in exon 14 mutation. 
The sensitivity limit ofthe analysis is approximately 1% mutation positive 
cells. This result doesnot rule out presence of a chronic myeloproliferative 
neoplasm. If adiagnosis of essential thrombocythemia (ET) or primary 
myelofibrosis (PMF)is clinically indicated, testing for mutations in exon 9 of 
theCalreticulin (CALR) gene and exon 10 of the MPL gene should be considered.In 
the JAK2 (V617F) negative population, CALR exon 9 mutations are presentin 67% of
 ET and 88% of PMF patients (Elvira ALCANTARA et al. The New EnglandJournal of 
Medicine. 2013; 369: 25: 3198-7571). MPL exon 10 mutations arepresent in ~3-5% 
of ET and PMF patients (Carlee ANAND et al. J Mol Diag.2013;15:733-743). If a 
diagnosis of polycythemia vera (PV) is clinicallyindicated, testing for JAK2 
exon 12 mutations should be considered.Approximately 3% of PV cases carry a JAK2
 exon 12 mutation (Sulaiman ALCANTARA etal.  [employer] Clin. Proc. 2005;80:947-958; Yash BRITTON 
et al. Am. J. ofHematology 2011; 86(9) 179-67).  This test was developed and 
its performance determined by the Departmentof Pathology. Although it has not 
been cleared or approved by the U.S.Food and Drug Administration (FDA), the FDA 
has determined that suchapproval is not necessary. The test has been validated 
and authorized forclinical use by the [place] Dept. of Health ([facility]).rw/jsElectronically Signed By Kingsley Phipps M.D. Attending Pathologist  16:32:20  









          Name      Value     Range     Interpretation Code Description Data Ene

rce(s) Supporting 

Document(s)

 

                                                                       









                    ID                  Date                Data Source

 

                    [id number]          2021 08:33:00 AM EDT [facility] ([facility])









          Name      Value     Range     Interpretation Code Description Data Ene

rce(s) Supporting 

Document(s)

 

          Magnesium 1.9 mg/dL 1.8-2.4                       [facility])  









                    ID                  Date                Data Source

 

                    [id number]          2021 08:33:00 AM EDT [facility] ([facility])









          Name      Value     Range     Interpretation Code Description Data Ene

rce(s) Supporting 

Document(s)

 

           Cholesterol [Mass/volume] in Serum or Plasma 143 mg/dL  131-200      

                    [facility] 

([facility])                   

 

           Triglyceride [Mass/volume] in Serum or Plasma 105 mg/dL        

                     [facility] 

([facility])                   

 

           Cholesterol in HDL [Mass/volume] in Serum or Plasma 89 mg/dL   35-60 

                           [facility] 

([facility])                   

 

                    Cholesterol in LDL [Mass/volume] in Serum or Plasma by calcu

lation 33 CALC             

                                          [facility] ([facility])  









                    ID                  Date                Data Source

 

                    [id number]          2021 08:33:00 AM EDT [facility] ([facility])









          Name      Value     Range     Interpretation Code Description Data Ene

rce(s) Supporting 

Document(s)

 

           Urea nitrogen [Mass/volume] in Serum or Plasma 19 mg/dL   7-18       

                      [facility] 

([facility])                   

 

          Creatinine 1.0 mg/dL 0.6-1.3                       [facility] ([facility]

[facility])  

 

           Glucose [Mass/volume] in Serum or Plasma 94 mg/dL   74-99            

                [facility] ([facility])                              

 

                                        100-125 mg/dL     PRE-DIABETES/FASTING

>126 mg/dL          DIABETES/FASTING

 

 

           Sodium [Moles/volume] in Serum or Plasma 142 meq/L  136-145          

                [facility] ([facility])                             

 

           Potassium [Moles/volume] in Serum or Plasma 4.1 meq/L  3.5-5.1       

                   [facility] 

([facility])                   

 

           Chloride [Moles/volume] in Serum or Plasma 104 meq/L           

                  [facility] 

([facility])                   

 

           Calcium [Mass/volume] in Serum or Plasma 9.0 mg/dL  8.5-10.1         

                [facility] 

([facility])                   

 

           Carbon dioxide, total [Moles/volume] in Serum or Plasma 30 meq/L   21

-32                            

[facility] ([facility])            

 

                    Alkaline phosphatase isoenzyme [Units/volume] in Serum or Pl

asma 112 mg/dL           

                                          [facility] ([facility])  

 

                          Aspartate aminotransferase [Enzymatic activity/volume]

 in Serum or Plasma 17 U/L

             15-37                                  [facility] ([facility]

)  

 

          Total Bilirubin 0.3 mg/dL 0.2-1.0                       [facility] ([facility])  

 

                    Alanine aminotransferase [Enzymatic activity/volume] in Seru

m or Plasma 23 U/L              

12-78                                           [facility] ([facility])  

 

           Albumin [Mass/volume] in Serum or Plasma 3.0 g/dL   3.4-5.0          

                [facility] ([facility])                              

 

          A/G Ratio 0.68 CALC 1.00-1.90                     [facility])  

 

           Proteinase 3 Ab [Units/volume] in Serum 7.4 g/dL   6.4-8.2           

               [facility] ([facility])                              

 

                                        Glomerular filtration rate/1.73 sq M pre

dicted among non-blacks [Volume 

Rate/Area] in Serum or Plasma by Creatinine-based formula (MDRD) 55 mL/min      

                                  

                          [facility] ([facility])  

 

                                        Glomerular filtration rate/1.73 sq M pre

dicted among blacks [Volume Rate/Area] 

in Serum or Plasma by Creatinine-based formula (MDRD) Laboratory test result    

                  

                                        [facility] ([facility])  

 

                                        <content>CHRONIC KIDNEY DISEASE STAGING 

PER 

NKF</content><br/><content></content><br/><content>STAGE I & II      GFR >= 60  
     NORMAL TO MILDLY DECREASED</content><br/><content>STAGE III          GFR 
30-59          MODERATELY DECREASED</content><br/><content>STAGE IV           
GFR 15-29         SEVERELY DECREASED</content><br/><content>STAGE V            
GFR <15            VERY LITTLE GFR LEFT</content><br/><content>ESRD             
   GFR <15            ON RRT</content><br/><content></content> 









                    ID                  Date                Data Source

 

                    [id number]          2021 08:33:00 AM EDT [facility])









          Name      Value     Range     Interpretation Code Description Data Ene

rce(s) Supporting 

Document(s)

 

           Hemoglobin A1c/Hemoglobin.total in Blood 6.0 %                       

                [facility] ([facility])                              

 

                                        Lab Result Notes:

Pre-Diabetes   5.7 - 6.4 %

Diabetes           = or > 6.5%

 

 

                          Glucose mean value [Mass/volume] in Blood Estimated fr

om glycated hemoglobin 125

 mg/dL                                        [facility] ([facility]

)  









                    ID                  Date                Data Source

 

                    [id number]          2021 08:33:00 AM EDT [facility])









          Name      Value     Range     Interpretation Code Description Data Ene

rce(s) Supporting 

Document(s)

 

           Leukocytes [#/volume] in Blood by Automated count 9.2 x10*3/UL 4.1-10

.9                         

[facility] ([facility])            

 

                                        NOTE:

CBC VERIFIED





 

 

           Erythrocytes [#/volume] in Blood by Automated count 4.37 x10*6/UL 4.2

0-6.30                        

[facility] ([facility])            

 

           Hemoglobin [Mass/volume] in Blood 12.3 g/dL  12.0-18.0               

         [facility] ([facility])                              

 

          MCH       28.2 pg   26.0-32.0                     [facility] ([facility])  

 

           Hematocrit [Volume Fraction] of Blood by Automated count 36.6 %     3

7.0-51.0                        

[facility]            

 

          MCV       83.6 fL   80.0-97.0                     [facility] ([facility])  

 

           Platelets [#/volume] in Blood by Automated count 506 x10*3/-440

                          [facility]

 ([facility]                  

 

          MCHC      33.7 g/dL 31.0-38.0                     [facility] ([facility])  

 

           Erythrocyte distribution width [Ratio] by Automated count 15.5 %     

11.6-13.7                        

[facility] ([facility]            

 

          Mid %     5.4 %     1.7-9.3                       [facility] ([facility])  

 

          Lymph %   19.4 %    10.0-58.5                     [facility] ([facility])  

 

          MPV       7.1 FL    7.8-11.0                      [facility] ([facility])  

 

          Mid #     0.6 x10*3/UL 0.1-0.6                       [facility] ([facility])  

 

          Lymph #   1.7 x10*3/UL 0.6-4.1                       [facility] ([facility])  

 

          Neut %    75.2 %    37.0-92.0                     [facility] ([facility])  

 

          Neut #    6.9 x10*3/UL 2.0-7.8                       [facility] ([facility])  









                    ID                  Date                Data Source

 

                    [id number]          2021 02:25:00 PM EDT [facility] ([facility])









          Name      Value     Range     Interpretation Code Description Data Ene

rce(s) Supporting 

Document(s)

 

          Creatinine For GFR 0.80 mg/dL 0.55-1.30                     [facility] ([facility])  

 

           Glomerular Filtration Rate Laboratory test result                    

              [facility] ([facility])                              

 

                                        <content>Units are mL/min/1.73 

m2</content><br/><content></content><br/><content>Chronic Kidney Disease Staging
 per NKF:</content><br/><content></content><br/><content>Stage I & II   GFR >=60
       Normal to Mildly Decreased</content><br/><content>Stage III      GFR 30-
59      Moderately Decreased</content><br/><content>Stage IV       GFR 15-29    
  Severely Decreased</content><br/><content>Stage V        GFR <15        Very 
Little GFR Left</content><br/><content>ESRD           GFR <15 on 
RRT</content><br/><content></content> 









                    ID                  Date                Data Source

 

                    [id number]          2021 02:25:00 PM EDT [facility] ([facility])









          Name      Value     Range     Interpretation Code Description Data Ene

rce(s) Supporting 

Document(s)

 

           Urea nitrogen [Mass/volume] in Serum or Plasma 16 mg/dL   7-18       

                      [facility] 

([facility])                   









                    ID                  Date                Data Source

 

                    [id number]         2021 02:25:00 PM [facility]









          Name      Value     Range     Interpretation Code Description Data Ene

rce(s) Supporting 

Document(s)

 

             Creatinine For GFR 0.80 mg/dL   0.55-1.30    Normal (applies to non

-numeric results) 

                          [facility] ([facility])  

 

                Glomerular Filtration Rate Laboratory test result               

  Normal (applies to non-

numeric results)                        [facility] ([facility]) 

 

 

                                        <content>Units are mL/min/1.73 

m2</content><br/><content></content><br/><content>Chronic Kidney Disease Staging
 per NKF:</content><br/><content></content><br/><content>Stage I & II   GFR >=60
       Normal to Mildly Decreased</content><br/><content>Stage III      GFR 30-
59      Moderately Decreased</content><br/><content>Stage IV       GFR 15-29    
  Severely Decreased</content><br/><content>Stage V        GFR <15        Very 
Little GFR Left</content><br/><content>ESRD           GFR <15 on 
RRT</content><br/><content></content> 









                    ID                  Date                Data Source

 

                    [id number]         2021 02:25:00 PM EDT [facility] ([facility])









          Name      Value     Range     Interpretation Code Description Data Ene

rce(s) Supporting 

Document(s)

 

                Urea nitrogen [Mass/volume] in Serum or Plasma 16 mg/dL        7

-18            Normal (applies to 

non-numeric results)                     [facility])

  









                    ID                  Date                Data Source

 

                    [id number]         2021 12:08:00 PM EDT [facility] ([facility])









          Name      Value     Range     Interpretation Code Description Data Ene

rce(s) Supporting 

Document(s)

 

                Monitr Crohn's Disease Laboratory test result                 No

rmal (applies to non-numeric 

results)                                [facility]) 

 

 

                                        SEE SEPARATE REPORT

 









                    ID                  Date                Data Source

 

                    [id number]         2021 12:08:00 PM EDT [facility] ([facility])









          Name      Value     Range     Interpretation Code Description Data Ene

rce(s) Supporting 

Document(s)

 

           QuantiFERON TB1 Ag Value 0.05 IU/ml            Normal (applies to non

-numeric results)            

[facility] ([facility])  

 

                QuantiFERON Criteria Laboratory test result                 Norm

al (applies to non-numeric 

results)                                [facility] ([facility]) 

 

 

                                        .

The QuantiFERON-TB Gold Plus result is determined by

subtracting the Nil value from either TB antigen (Ag) tube.

The mitogen tube serves as a control for the test.

 

 

           QuantiFERON Nil Value 0.06 IU/ml            Normal (applies to non-nu

meric results)            

[facility] ([facility])  

 

           QuantiFERON TB2 Ag Value 0.05 IU/ml            Normal (applies to non

-numeric results)            

[facility] ([facility])  

 

                QuantiFERON-TB Gold Plus Laboratory test result                 

Normal (applies to non-numeric

 results)                               [facility]) 

 

 

                                        The specimen received for QuantiFERON te

sting was incubated

by the ordering institution. Specific procedures outlined

in our Directory of Services and in the package insert for

the QuantiFERON Gold (In Tube) test must be followed to

enable for proper stimulation of cells for the production

of interferon gamma. Chemiluminescence immunoassay

methodology

Performed at:  [address] [place]  [id number]

: Araceli B Reyes MD, Phone:  [phone]

 

 

                QuantiFERON Mitogen Value Laboratory test result                

 Normal (applies to non-

numeric results)                        [facility] ([facility]) 

 









                    ID                  Date                Data Source

 

                    [id number]         2021 12:08:00 PM EDT [facility]









          Name      Value     Range     Interpretation Code Description Data Ene

rce(s) Supporting 

Document(s)

 

                Hepatitis B virus surface Ab [Units/volume] in Serum 4.9 mIU/mL 

                     Below low 

normal                                  [facility] ([facility]) 

 

 

                                        Status of Immunity                     A

nti-HBs Level

                                        ------------------                     -

-------------

Inconsistent with Immunity                   0.0 - 9.9

Consistent with Immunity                          >9.9

 

 

                Hepatitis B virus core Ab [Presence] in Serum Laboratory test re

sult                 Normal 

(applies to non-numeric results)                     [facility])  

 

                                        Performed at:  [place] - [facility] [address]  [id number]

: Araceli B Reyes MD, Phone:  [phone]

 









                    ID                  Date                Data Source

 

                    [id number]         2021 12:08:00 PM EDT [facility] ([facility])









          Name      Value     Range     Interpretation Code Description Data Ene

rce(s) Supporting 

Document(s)

 

           Ibdser1    Laboratory test result            Normal (applies to non-n

umeric results)            

[facility] ([facility])  

 

                                        SEE SEPARATE REPORT

 

 

           Crohn1     Laboratory test result            Normal (applies to non-n

umeric results)            [facility]

 ([facility])        

 

                                        SEE SEPARATE REPORT

Testing performed at reference lab . Report copy to follow

on a separate form. 21 REF LAB#:[id number]

 









                    ID                  Date                Data Source

 

                    [id number]         2021 12:08:00 PM EDT [facility] ([facility])









          Name      Value     Range     Interpretation Code Description Data Ene

rce(s) Supporting 

Document(s)

 

                Erythrocyte sedimentation rate by Westergren method 44 mm/hr    

    0-30            Above high 

normal                                  [facility] ([facility]) 

 

 

                          C reactive protein [Mass/volume] in Serum or Plasma by

 High sensitivity method 

0.66 mg/dL      0.00-0.30       Above high normal                 [facility] ([facility])

                                         









                    ID                  Date                Data Source

 

                    [id number]         2021 12:08:00 PM EDT [facility] ([facility])









          Name      Value     Range     Interpretation Code Description Data Ene

rce(s) Supporting 

Document(s)

 

           Vitamin B12 Level 764 pg/mL             Normal (applies to non-numeri

c results)            [facility] 

([facility])         

 

                                        VITAMIN B12 NORMAL RANGE



NORMAL                     247 - 911 PG/ML

INDETERMINATE              211 - 246 PG/ML

DEFICIENT              LESS THAN 211 PG/ML

 

 

           Folate     20.3 ng/mL            Normal (applies to non-numeric resul

ts)            [facility] ([facility])                    

 

                                        FOLATE NORMAL RANGE



NORMAL             GREATER THAN 5.4 NG/ML

INDETERMINATE               3.4-5.4 NG/ML

DEFICIENT             LESS THAN 3.4 NG/ML

 









                    ID                  Date                Data Source

 

                    [id number]         2021 12:08:00 PM EDT [facility] ([facility])









          Name      Value     Range     Interpretation Code Description Data Ene

rce(s) Supporting 

Document(s)

 

           Iron (Fe)  37 ug/dL        Below low normal            [facility] (

[facility])                                     

 

                Total Iron Binding Capacity 383 ug/dL       250-450         Norm

al (applies to non-numeric 

results)                                [facility] ([facility]) 

 

 

           Percent Saturation 9.7 %      13.2-45.0  Below low normal            

[facility] ([facility])                            









                    ID                  Date                Data Source

 

                    [id number]         2021 12:08:00 PM EDT [facility] ([facility])









          Name      Value     Range     Interpretation Code Description Data Ene

rce(s) Supporting 

Document(s)

 

           Red Blood Count 4.32 10    4.00-5.40  Normal (applies to non-numeric 

results)            

[facility] ([facility])  

 

           White Blood Count 10.1 10    4.0-10.0   Above high normal            

[facility] ([facility])                           

 

           Hemoglobin 11.7 g/dL  12.0-15.5  Below low normal            [facility] (

[facility])                            

 

           Hematocrit 36.6 %     36.0-47.0  Normal (applies to non-numeric resul

ts)            [facility] 

([facility])         

 

                Mean Corpuscular Volume 84.7 fl         80.0-96.0       Normal (

applies to non-numeric 

results)                                [facility] ([facility]) 

 

 

                Mean Corpuscular Hemoglobin 27.1 pg         27.0-33.0       Norm

al (applies to non-numeric 

results)                                [facility] ([facility]) 

 

 

                Mean Corpuscular HGB Conc 32.0 g/dL       32.0-36.5       Normal

 (applies to non-numeric 

results)                                [facility] ([facility]) 

 

 

           Red Cell Distribution Width 17.5 %     11.5-14.5  Above high normal  

          [facility] 

([facility])         

 

           Platelet Count, Automated 510 10     150-450    Above high normal    

        [facility] ([facility])                    

 

           Lymph %    20.5 %     24.0-44.0  Below low normal            [facility]                                      

 

           Neutrophils % 70.4 %     36.0-66.0  Above high normal            [facility]

NT ([facility])                            

 

           Mono %     7.0 %      2.0-8.0    Normal (applies to non-numeric resul

ts)            [facility] ([facility])                   

 

           Eos %      1.1 %      0.0-3.0    Normal (applies to non-numeric resul

ts[facility]            [facility]                    

 

           Baso %     0.6 %      0.0-1.0    Normal (applies to non-numeric resul

ts)            [facility] ([facility])                   

 

           Nucleated Red Blood Cell % 0.0 %      0-0        Normal (applies to n

on-numeric results)            

[facility]  

 

           Immature Granulocyte % 0.4 %      0-3.0      Normal (applies to non-n

umeric results)            

[facility] ([facility])  

 

           Lymph #    2.1 10     1.5-5.0    Normal (applies to non-numeric resul

ts)            [facility] 

[facility])         

 

           Neutrophils # 7.1 10     1.5-8.5    Normal (applies to non-numeric re

sults)            [facility] 

([facility])         

 

           Mono #     0.7 10     0.0-0.8    Normal (applies to non-numeric resul

ts)            [facility] 

([facility], )         

 

           Eos #      0.1 10     0.0-0.5    Normal (applies to non-numeric resul

ts)            [facility] ([facility])                   

 

           Baso #     0.1 10     0.0-0.2    Normal (applies to non-numeric resul

ts)            [facility] 

([facility])         









                    ID                  Date                Data Source

 

                    [id number]          2021 12:08:00 PM EDT [facility]









          Name      Value     Range     Interpretation Code Description Data Ene

rce(s) Supporting 

Document(s)

 

           Laboratory test finding (navigational concept) Laboratory test result

                                  

[facility] ([facility])            

 

                                        SEE SEPARATE REPORT

 

 

           Laboratory test finding (navigational concept) Laboratory test result

                                  

[facility] ([facility])            

 

                                        SEE SEPARATE REPORT

Testing performed at reference lab . Report copy to follow

on a separate form. 21 REF LAB#:[id number]

 









                    ID                  Date                Data Source

 

                    [id number]          2021 12:08:00 PM EDT [facility] ([facility])









          Name      Value     Range     Interpretation Code Description Data Ene

rce(s) Supporting 

Document(s)

 

           Laboratory test finding (navigational concept) Laboratory test result

                                  

[facility] ([facility])            

 

                                        SEE SEPARATE REPORT

 









                    ID                  Date                Data Source

 

                    [id number]          2021 12:08:00 PM EDT [facility] ([facility])









          Name      Value     Range     Interpretation Code Description Data Ene

rce(s) Supporting 

Document(s)

 

           Hepatitis B virus surface Ab [Units/volume] in Serum 4.9 mIU/mL      

                            [facility] 

([facility])                   

 

                                        Status of Immunity                     A

nti-HBs Level

                                        ------------------                     -

-------------

Inconsistent with Immunity                   0.0 - 9.9

Consistent with Immunity                          >9.9

 

 

           Hepatitis B virus core Ab [Presence] in Serum Laboratory test result 

                                 

[facility] ([facility])            

 

                                        Performed at:  RN - [facility] [address]  [id number]

: Araceli B Reyes MD, Phone:  [phone]

 









                    ID                  Date                Data Source

 

                    [id number]          2021 12:08:00 PM EDT [facility] ([facility])









          Name      Value     Range     Interpretation Code Description Data Ene

rce(s) Supporting 

Document(s)

 

          QuantiFERON Criteria Laboratory test result                           

    [facility] ([facility]) 

 

 

                                        .

The QuantiFERON-TB Gold Plus result is determined by

subtracting the Nil value from either TB antigen (Ag) tube.

The mitogen tube serves as a control for the test.

 

 

          QuantiFERON TB1 Ag Value 0.05 IU/ml                               [facility]

NT ([facility])  

 

          QuantiFERON TB2 Ag Value 0.05 IU/ml                               [facility]

NT ([facility])  

 

          QuantiFERON Nil Value 0.06 IU/ml                               [facility] 

([facility])  

 

           QuantiFERON Mitogen Value Laboratory test result                     

             [facility]                              

 

           QuantiFERON-TB Gold Plus Laboratory test result                      

            [facility] ([facility])                              

 

                                        The specimen received for QuantiFERON te

sting was incubated

by the ordering institution. Specific procedures outlined

in our Directory of Services and in the package insert for

the QuantiFERON Gold (In Tube) test must be followed to

enable for proper stimulation of cells for the production

of interferon gamma. Chemiluminescence immunoassay

methodology

Performed at:   - [facility] [address]  [id number]

: Araceli B Reyes MD, Phone:  [phone]

 









                    ID                  Date                Data Source

 

                    [id number]          2021 12:08:00 PM EDT [facility] ([facility])









          Name      Value     Range     Interpretation Code Description Data Ene

rce(s) Supporting 

Document(s)

 

                          C reactive protein [Mass/volume] in Serum or Plasma by

 High sensitivity method 

0.66 mg/dL   0.00-0.30                              [facility] ([facility]

)  









                    ID                  Date                Data Source

 

                    [id number]          2021 12:08:00 PM EDT [facility] ([facility])









          Name      Value     Range     Interpretation Code Description Data Ene

rce(s) Supporting 

Document(s)

 

          Vitamin B12 Level 764 pg/mL                               [facility] ([facility])  

 

                                        VITAMIN B12 NORMAL RANGE



NORMAL                     247 - 911 PG/ML

INDETERMINATE              211 - 246 PG/ML

DEFICIENT              LESS THAN 211 PG/ML

 

 

          Folate    20.3 ng/mL                               [facility] ([facility]

[facility])  

 

                                        FOLATE NORMAL RANGE



NORMAL             GREATER THAN 5.4 NG/ML

INDETERMINATE               3.4-5.4 NG/ML

DEFICIENT             LESS THAN 3.4 NG/ML

 









                    ID                  Date                Data Source

 

                    [id number]          2021 12:08:00 PM EDT [facility] ([facility])









          Name      Value     Range     Interpretation Code Description Data Ene

rce(s) Supporting 

Document(s)

 

          Iron (Fe) 37 ug/dL                          [facility] ([facility])  

 

          Total Iron Binding Capacity 383 ug/dL 250-450                       [facility]

[facility] ([facility]) 

 

 

          Percent Saturation 9.7 %     13.2-45.0                     [facility] ([facility])  









                    ID                  Date                Data Source

 

                    [id number]          2021 12:08:00 PM EDT [facility] ([facility])









          Name      Value     Range     Interpretation Code Description Data Ene

rce(s) Supporting 

Document(s)

 

           Erythrocyte sedimentation rate by Westergren method 44 mm/hr   0-30  

                           [facility] 

([facility])                   









                    ID                  Date                Data Source

 

                    [id number]          2021 12:08:00 PM EDT [facility] ([facility])









          Name      Value     Range     Interpretation Code Description Data Ene

rce(s) Supporting 

Document(s)

 

          White Blood Count 10.1 10   4.0-10.0                      [facility] ([facility])  

 

          Red Blood Count 4.32 10   4.00-5.40                     [facility] ([facility])  

 

          Hemoglobin 11.7 g/dL 12.0-15.5                     [facility] ([facility]

[facility])  

 

          Hematocrit 36.6 %    36.0-47.0                     [facility] ([facility]

[facility])  

 

          Mean Corpuscular Volume 84.7 fl   80.0-96.0                     [facility]

 ([facility])  

 

          Mean Corpuscular Hemoglobin 27.1 pg   27.0-33.0                     [facility]

[facility] ([facility]) 

 

 

          Mean Corpuscular HGB Conc 32.0 g/dL 32.0-36.5                     [facility]

NT ([facility]) 

 

 

          Red Cell Distribution Width 17.5 %    11.5-14.5                     [facility]

[facility] ([facility])  

 

          Platelet Count, Automated 510 10    150-450                       [facility]

NT ([facility])  

 

          Neutrophils % 70.4 %    36.0-66.0                     [facility] ([facility]

[facility])  

 

          Lymph %   20.5 %    24.0-44.0                     [facility] ([facility]

[facility])  

 

          Mono %    7.0 %     2.0-8.0                       [facility] ([facility]  

 

          Eos %     1.1 %     0.0-3.0                       [facility] ([facility]

[facility]  

 

          Baso %    0.6 %     0.0-1.0                       [facility] ([facility]  

 

          Immature Granulocyte % 0.4 %     0-3.0                         [facility] 

([facility])  

 

          Nucleated Red Blood Cell % 0.0 %     0-0                           MED

ENT ([facility])  

 

          Neutrophils # 7.1 10    1.5-8.5                       [facility] ([facility])  

 

          Lymph #   2.1 10    1.5-5.0                       [facility] ([facility]  

 

          Mono #    0.7 10    0.0-0.8                       [facility] ([facility]  

 

          Eos #     0.1 10    0.0-0.5                       [facility] ([facility]

[facility]  

 

          Baso #    0.1 10    0.0-0.2                       [facility] ([facility]

[facility]  









                    ID                  Date                Data Source

 

                    [id number]             2020 04:49:00 PM EST [facility] ([facility])









          Name      Value     Range     Interpretation Code Description Data Ene

rce(s) Supporting 

Document(s)

 

           Bacteria identified in Urine by Culture Laboratory test result       

                           [facility] 

([facility])            

 

                                        <content>FULL REPORT IN LAB NOTES (eCW a

nd 

[facility]).</content><br/><content></content><br/><content>ORGANISM 1: ESCHERICHIA 
COLI</content><br/><content></content><br/><content>COLONY COUNT                
  >
100,000</content><br/><content></content><br/><content></content><br/><content>O

RGANISM 1: ESCHERICHIA COLI</content><br/><content></content><br/><content>
ESCHERICHIA COLI:                                  
REACTION</content><br/><content>TRIMETHOPRIM/SULFAMETHOXAZOLE      IV         
160mg TMP & 800mg SMXq6h <=20      S</content><br/><content>
TRIMETHOPRIM/SULFAMETHOXAZOLE      PO         Bactrim DS Bid <=20      
S</content><br/><content>AMPICILLIN                         IV         500mg q6h
 <=2      S</content><br/><content>AMPICILLIN                         PO        
 500mg q6h fasting <=2      S</content><br/><content>GENTAMICIN                 
        IV         80mg  q8h <=1      S</content><br/><content>NITROFURANTOIN   
                  PO         100mg BID <=16      
S</content><br/><content>CEFAZOLIN                          IV         1gm q8h 
<=4      S</content><br/><content>LEVOFLOXACIN                       IV         
500mg qd <=0.12      S</content><br/><content>LEVOFLOXACIN                      
 PO         250mg qd <=0.12      S</content><br/><content>LEVOFLOXACIN          
             PO         500mg qd <=0.12      S</content><br/><content>TOBRAMYCIN
                         IV         80mg  q8h <=1      S</content><br/><content>
CEFTRIAXONE                        IV         1gm q24h <=1      
S</content><br/><content>CEFTAZIDIME                        IV         1gm  q8h 
<=1      S</content><br/><content>AMPICILLIN/SULBACTAM               IV         
1.5g q6h <=2      S</content><br/><content>PIPERACILLIN/TAZOBACTAM            IV
         2.25 gm q6h <=4      S</content><br/><content>AZTREONAM                
          IV         1gm  q8h <=1      S</content><br/><content>ERTAPENEM       
                   IV         1gm qd <=0.5      S</content><br/><content>
MEROPENEM                          IV         1 gm q8h <=0.25      
S</content><br/><content>MEROPENEM                          IV         500 mg 
q8h <=0.25      S</content><br/><content>TIGECYCLINE                        IV  
       50mg q12h <=0.5      S</content><br/><content>CEFEPIME                   
        IV         1 gm q12h <=1      S</content><br/><content>CEFEPIME         
                  IV         2 gm q12h <=1      S</content><br/><content>EXTD 
BRD SPCTRM BETA LACTAMASE     IV         NEGATIVE FOR 
ESBL</content><br/><content></content> 









                    ID                  Date                Data Source

 

                    [id number]          2020 01:20:00 PM EST [facility] ([facility])









          Name      Value     Range     Interpretation Code Description Data Ene

rce(s) Supporting 

Document(s)

 

           Laboratory test finding (navigational concept) Laboratory test result

                                  

[facility] ([facility])            

 

                                        ----------------------------------------

--------------------

Test: COVID-19 Nasal/Naspharynx

Result: NOT DETECTED           Reference Units: Not detected

                                        ----------------------------------------

--------------------

Note: Please consider re-collection of a new specimen, if

clinically indicated.



Note: The COVID-19 assay is under Emergency Use

Authorization(EUA) by the U.S. Food and Drug Administration.



[employer] is designated as a high complexity

laboratory by the Clinical Laboratory Improvement Amendments

of 1988(CLIA) and is qualified to perform this test.

ASSAY INFORMATION: Real Time RT-PCR



 









                    ID                  Date                Data Source

 

                    [id number]           2020 [facility]









          Name      Value     Range     Interpretation Code Description Data Ene

rce(s) Supporting 

Document(s)

 

          2019-nCoV RNA XXX SENA+probe-Imp                                       

  [facility]     

 

                                        This lab was ordered by [facility] and reported by 

[employer] 









                    ID                  Date                Data Source

 

                    [id number]          2020 11:48:00 AM EST [facility]









          Name      Value     Range     Interpretation Code Description Data Ene

rce(s) Supporting 

Document(s)

 

          Urine Creatinine 40.5 mg/dL 30.0-125.0                     [facility] ([facility])  

 

          Microalbumin Urine 4.3 mg/L  1.3-20.0                      [facility] ([facility])  

 

          Microalb/Creat Ratio 10.6 ug/mg 0.0-30.0                      [facility] (

[facility])  









                    ID                  Date                Data Source

 

                    [id number]          2020 11:48:00 AM EST [facility] ([facility])









          Name      Value     Range     Interpretation Code Description Data Ene

rce(s) Supporting 

Document(s)

 

           Urea nitrogen [Mass/volume] in Serum or Plasma 15 mg/dL   7-18       

                      [facility] 

([facility])                   

 

           Glucose [Mass/volume] in Serum or Plasma 96 mg/dL   74-99            

                [facility] ([facility])                              

 

                                        100-125 mg/dL     PRE-DIABETES/FASTING

>126 mg/dL          DIABETES/FASTING

 

 

          Creatinine 1.0 mg/dL 0.6-1.3                       [facility] ([facility]

[facility])  

 

           Sodium [Moles/volume] in Serum or Plasma 139 meq/L  136-145          

                [facility] ([facility])                             

 

           Chloride [Moles/volume] in Serum or Plasma 103 meq/L           

                  [facility] 

([facility])                   

 

           Potassium [Moles/volume] in Serum or Plasma 5.1 meq/L  3.5-5.1       

                   [facility] 

([facility])                   

 

           Carbon dioxide, total [Moles/volume] in Serum or Plasma 28 meq/L   21

-32                            

[facility] ([facility])            

 

           Calcium [Mass/volume] in Serum or Plasma 9.5 mg/dL  8.5-10.1         

                [facility] 

([facility])                   

 

                                        Glomerular filtration rate/1.73 sq M pre

dicted among blacks [Volume Rate/Area] 

in Serum or Plasma by Creatinine-based formula (MDRD) Laboratory test result    

                  

                                        [facility] ([facility])  

 

                                        <content>CHRONIC KIDNEY DISEASE STAGING 

PER 

NKF</content><br/><content></content><br/><content>STAGE I & II      GFR >= 60  
     NORMAL TO MILDLY DECREASED</content><br/><content>STAGE III          GFR 
30-59          MODERATELY DECREASED</content><br/><content>STAGE IV           
GFR 15-29         SEVERELY DECREASED</content><br/><content>STAGE V            
GFR <15            VERY LITTLE GFR LEFT</content><br/><content>ESRD             
   GFR <15            ON RRT</content><br/><content></content> 

 

                                        Glomerular filtration rate/1.73 sq M pre

dicted among non-blacks [Volume 

Rate/Area] in Serum or Plasma by Creatinine-based formula (MDRD) 55 mL/min      

                                  

                          [facility] ([facility])  









                    ID                  Date                Data Source

 

                    [id number]          2020 11:48:00 AM EST [facility] ([facility])









          Name      Value     Range     Interpretation Code Description Data Ene

rce(s) Supporting 

Document(s)

 

           Hemoglobin A1c/Hemoglobin.total in Blood 6.4 %                       

                [facility] ([facility])                              

 

                                        Lab Result Notes:

Pre-Diabetes   5.7 - 6.4 %

Diabetes           = or > 6.5%

 

 

                          Glucose mean value [Mass/volume] in Blood Estimated fr

om glycated hemoglobin 137

 mg/dL                                        [facility] ([facility]

)  









                    ID                  Date                Data Source

 

                    [id number]          2020 11:48:00 AM EST [facility] ([facility])









          Name      Value     Range     Interpretation Code Description Data Ene

rce(s) Supporting 

Document(s)

 

           Leukocytes [#/volume] in Blood by Automated count 9.0 x10*3/UL 4.1-10

.9                         

[facility] ([facility])            

 

                                        NOTE:

RESULT VERIFIED.





 

 

           Hemoglobin [Mass/volume] in Blood 11.4 g/dL  12.0-18.0               

         [facility]                              

 

           Erythrocytes [#/volume] in Blood by Automated count 4.14 x10*6/UL 4.2

0-6.30                        

[facility] ([facility])            

 

          MCV       81.3 fL   80.0-97.0                     [facility] ([facility])  

 

           Hematocrit [Volume Fraction] of Blood by Automated count 33.7 %     3

7.0-51.0                        

[facility] ([facility])            

 

          MCH       27.5 pg   26.0-32.0                     [facility] ([facility])  

 

          MCHC      33.9 g/dL 31.0-38.0                     [facility] ([facility])  

 

           Erythrocyte distribution width [Ratio] by Automated count 15.6 %     

11.6-13.7                        

[facility] ([facility])            

 

           Platelets [#/volume] in Blood by Automated count 557 x10*3/-440

                          [facility]

 ([facility])                  

 

          Mid %     5.7 %     1.7-9.3                       [facility]  

 

          MPV       8.4 FL    7.8-11.0                      [facility]  

 

          Lymph %   20.8 %    10.0-58.5                     [facility]  

 

          Neut %    73.5 %    37.0-92.0                     [facility]  

 

          Lymph #   1.8 x10*3/UL 0.6-4.1                       [facility] ([facility])  

 

          Neut #    6.6 x10*3/UL 2.0-7.8                       [facility] ([facility])  

 

          Mid #     0.6 x10*3/UL 0.1-0.6                       [facility] ([facility])  







                                        Procedure

 

                                          



                                                                                
                                                                                
                                                                                
                                                                                
                                                                                
                                                                                
                                                                                
                                                                                
                                                                                
                            



Social History

          



           Code       Duration   Value      Status     Description Data Source(s

)

 

           Smoking    10/05/2021 12:00:00 AM EDT Never Smoker completed  Never S

moker eCW1 

([facility])

 

           Smoking    10/05/2021 12:00:00 AM EDT Never Smoker completed  Never S

moker eCW1 

([facility])

 

           Smoking    10/05/2021 12:00:00 AM EDT Never Smoker completed  Never S

moker eCW1 

([facility])

 

             Smoking      2020 12:00:00 AM EST Patient has never smoked co

mpleted    Patient 

has never smoked                        [facility] ([facility])



                                                                                
                                                



Vital Signs

          



                    ID                  Date                Data Source

 

                    UNK                                      









           Name       Value      Range      Interpretation Code Description Data

 Source(s)

 

           Systolic blood pressure 141 mm[Hg]                       141 mm[Hg] e

CW1 ([facility])

 

           Body temperature 98.1 [degF]                       98.1 [degF] eCW1 (

[facility])

 

           Diastolic blood pressure 63 mm[Hg]                        63 mm[Hg]  

eCW1 ([facility])

 

           Body height 60 [in_i]                        60 [in_i]  eCW1 ([facility])

 

           Body mass index (BMI) [Ratio] 24.21 kg/m2                       24.21

 kg/m2 eCW1 ([facility])

 

           Respiratory rate 17 /min                          17 /min    eCW1 ([facility])

 

           Heart rate 102 /min                         102 /min   eCW1 ([facility])

 

           Body weight 124 [lb_av]                       124 [lb_av] eCW1 ([facility]

 

           Systolic blood pressure 112 mm[Hg]                       112 mm[Hg] M

ED[facility] ([facility])

 

           Diastolic blood pressure 70 mm[Hg]                        70 mm[Hg]  

[facility] ([facility])

 

           Heart rate 98 /min                          98 /min    [facility] ([facility])

 

           Body height 61.25 [in_i]                       61.25 [in_i] [facility] (

[facility]

 

                                        5'1.25" 

 

           Body weight 122.00 [lb_av]                       122.00 [lb_av] [facility]

T ([facility])

 

           Oxygen saturation in Arterial blood by Pulse oximetry 97 %           

                  97 %       [facility] 

([facility])

 

           Body mass index (BMI) [Ratio] 22.9 kg/m2                       22.9 k

g/m2 [facility] ([facility]

 

           Body surface area Derived from formula 1.53 m2                       

   1.53 m2    [facility] ([facility], )

 

           Systolic blood pressure 112 mm[Hg]                       112 mm[Hg] M

EDENT ([facility], )

 

           Diastolic blood pressure 64 mm[Hg]                        64 mm[Hg]  

[facility] ([facility], )

 

           Body height 60.5 [in_i]                       60.5 [in_i] [facility] ([facility], )

 

                                        5'0.50" 

 

           Body weight 124.00 [lb_av]                       124.00 [lb_av] [facility]

T ([facility], )

 

           Body mass index (BMI) [Ratio] 23.8 kg/m2                       23.8 k

g/m2 [facility]

 

           Ideal body weight 100 [lb_av]                       100 [lb_av] [facility]

T ([facility], )

 

           Body weight 56.246 kg                        56.246 kg  [facility] ([facility])

 

           Body weight 125.00 [lb_av]                       125.00 [lb_av] [facility]

T ([facility])

 

           Systolic blood pressure 130 mm[Hg]                       130 mm[Hg] M

EDENT ([facility])

 

           Diastolic blood pressure 64 mm[Hg]                        64 mm[Hg]  

[facility] ([facility])

 

           Heart rate 88 /min                          88 /min    [facility] ([facility])

 

           Body height 61.25 [in_i]                       61.25 [in_i] [facility] ([facility])

 

                                        5'1.25" 

 

           Oxygen saturation in Arterial blood by Pulse oximetry 96 %           

                  96 %       [facility])

 

           Body mass index (BMI) [Ratio] 23.4 kg/m2                       23.4 k

g/m2 [facility] ([facility])

 

           Systolic blood pressure 126 mm[Hg]                       126 mm[Hg] M

[facility]

 

           Diastolic blood pressure 62 mm[Hg]                        62 mm[Hg]  

[facility] ([facility])

 

           Body surface area Derived from formula 1.52 m2                       

   1.52 m2    [facility] ([facility])

 

           Body height 60.5 [in_i]                       60.5 [in_i] [facility] ([facility])

 

                                        5'0.50" 

 

           Body weight 122.00 [lb_av]                       122.00 [lb_av] [facility]

T ([facility])

 

           Body mass index (BMI) [Ratio] 23.4 kg/m2                       23.4 k

g/m2 [facility] ([facility])

 

           Ideal body weight 100 [lb_av]                       100 [lb_av] [facility]

T ([facility])

 

           Body weight 55.339 kg                        55.339 kg  [facility] ([facility])

 

           Ideal body weight 100 [lb_av]                       100 [lb_av] [facility]

T ([facility])

 

           Body weight 53.071 kg                        53.071 kg  [facility] ([facility])

 

           Body surface area Derived from formula 1.50 m2                       

   1.50 m2    [facility] ([facility])

 

           Body height 60.5 [in_i]                       60.5 [in_i] [facility]

 

                                        5'0.50" 

 

           Body weight 117.00 [lb_av]                       117.00 [lb_av] [facility]

T ([facility])

 

           Systolic blood pressure 118 mm[Hg]                       118 mm[Hg] M

EDENT ([facility])

 

           Diastolic blood pressure 60 mm[Hg]                        60 mm[Hg]  

[facility]

 

           Body height 60.5 [in_i]                       60.5 [in_i] [facility] ([facility])

 

                                        5'0.50" 

 

           Body weight 117.00 [lb_av]                       117.00 [lb_av] [facility]

T ([facility])

 

           Body mass index (BMI) [Ratio] 22.5 kg/m2                       22.5 k

g/m2 [facility] ([facility])

 

           Body mass index (BMI) [Ratio] 22.5 kg/m2                       22.5 k

g/m2 [facility] ([facility])

 

           Ideal body weight 100 [lb_av]                       100 [lb_av] [facility]

T ([facility])

 

           Body weight 53.071 kg                        53.071 kg  [facility] ([facility])

 

           Body surface area Derived from formula 1.50 m2                       

   1.50 m2    [facility] ([facility])

 

           Systolic blood pressure 128 mm[Hg]                       128 mm[Hg] M

[facility]

 

           Diastolic blood pressure 68 mm[Hg]                        68 mm[Hg]  

[facility]

 

           Heart rate 74 /min                          74 /min    [facility]

 

           Body height 61.25 [in_i]                       61.25 [in_i] [facility] (

[facility]

 

                                        5'1.25" 

 

           Body weight 118.50 [lb_av]                       118.50 [lb_av] [facility]

T ([facility])

 

           Oxygen saturation in Arterial blood by Pulse oximetry 98 %           

                  98 %       [facility] 

([facility])

 

                                         Air 

 

           Body mass index (BMI) [Ratio] 22.2 kg/m2                       22.2 k

g/m2 [facility] ([facility]

 

           Body height 61.25 [in_i]                       61.25 [in_i] [facility]

 

                                        5'1.25" 

 

           Systolic blood pressure 144 mm[Hg]                       144 mm[Hg] M

[facility]

 

           Body mass index (BMI) [Ratio] 21.7 kg/m2                       21.7 k

g/m2 [facility] ([facility]

 

           Diastolic blood pressure 68 mm[Hg]                        68 mm[Hg]  

[facility] ([facility])

 

           Systolic blood pressure 146 mm[Hg]                       146 mm[Hg] [facility] ([facility])

 

           Diastolic blood pressure 62 mm[Hg]                        62 mm[Hg]  

[facility] ([facility])

 

           Heart rate 88 /min                          88 /min    [facility] ([facility])

 

           Body weight 116.00 [lb_av]                       116.00 [lb_av] [facility]

T ([facility])

 

           Systolic blood pressure 110 mm[Hg]                       110 mm[Hg] [facility] ([facility])

 

           Diastolic blood pressure 60 mm[Hg]                        60 mm[Hg]  

[facility] ([facility])

 

           Body height 60.5 [in_i]                       60.5 [in_i] [facility] ([facility])

 

                                        5'0.50" 

 

           Body weight 116.00 [lb_av]                       116.00 [facility]_av] [facility] ([facility]

 

           Body mass index (BMI) [Ratio] 22.3 kg/m2                       22.3 k

g/m2 [facility] ([facility])

 

           Ideal body weight 100 [lb_av]                       100 [lb_av] [facility]

[facility] ([facility]

 

           Body weight 52.618 kg                        52.618 kg  [facility] ([facility])

 

           Body surface area Derived from formula 1.49 m2                       

   1.49 m2    [facility] ([facility])

 

           Systolic blood pressure 156 mm[Hg]                       156 mm[Hg] M

[facility]

 

           Heart rate 116 /min                         116 /min   [facility]

 

           Respiratory rate 15 /min                          15 /min    [facility] (

[facility]

 

           Oxygen saturation in Arterial blood by Pulse oximetry 97 %           

                  97 %       [facility] 

([facility])

 

           Body temperature 98.1 [degF]                       98.1 [degF] [facility]

 ([facility])

 

           Body weight 114.00 [lb_av]                       114.00 [lb_av] [facility]

T ([facility])

 

           Body height 60 [in_i]                        60 [in_i]  [facility]

 

                                        5'0" 

 

           Diastolic blood pressure 76 mm[Hg]                        76 mm[Hg]  

[facility] ([facility])

 

           Body mass index (BMI) [Ratio] 22.3 kg/m2                       22.3 k

g/m2 [facility] ([facility])

 

           Body height 61.25 [in_i]                       61.25 [in_i] [facility]

 

                                        5'1.25" 

 

           Body weight 117.00 [lb_av]                       117.00 [lb_av] [facility]

T ([facility])

 

           Oxygen saturation in Arterial blood by Pulse oximetry 97 %           

                  97 %       [facility])

 

                                         Air 

 

           Body mass index (BMI) [Ratio] 21.9 kg/m2                       21.9 k

g/m2 [facility]

 

           Systolic blood pressure 120 mm[Hg]                       120 mm[Hg] M

EDENT ([facility])

 

           Diastolic blood pressure 70 mm[Hg]                        70 mm[Hg]  

[facility]

 

           Heart rate 90 /min                          90 /min    [facility] ([facility])

## 2021-10-28 NOTE — CCD
Continuity of Care Document (CCD)

                             Created on: 2021



Vanesa Kinsey

External Reference #: [id number]

: 1955

Sex: Female



Demographics





                          Address                   [address] [place] [address]

 

                          Home Phone                [phone]

 

                          Preferred Language        Unknown

 

                          Marital Status            Unknown

 

                          Judaism Affiliation     Unknown

 

                          Race                      White

 

                          Ethnic Group              Not  or 





Author





                          Organization              Unknown

 

                          Address                   Unknown

 

                          Phone                     Unavailable







Care Team Providers





                    Care Team Member Name Role                Phone

 

                    Jessica Cervantes MD    AUTM                [phone]

 

                    Olya Bangura   AUTM                [phone]

 

                    Israel Farmer MD AUTM                [phone]







Problems





                    Active Problems     Provider            Date

 

                    Pulmonary embolism  NIMA Douglas    Onset: 04/10/2012

 

                    Gastroesophageal reflux disease LUCRETIA Ring Onset

: 04/10/2012

 

                    Pure hypercholesterolemia LUCRETIA Ring Onset: 

 

                    Type 2 diabetes mellitus LUCRETIA Ring Onset: 04/10

/2012

 

                    Anemia              Marc Rodriguez D.O. Onset: 04/10/

2012







Social History





                Type            Date            Description     Comments

 

                Birth Sex                       Unknown          

 

                ETOH Use                        Occasionally consumes beer  

 

                Tobacco Use     Start: Unknown End: Unknown Patient is a former 

smoker SMOKED FOR 

15YRS 1 MAGGY A DAY quit age 37 







Allergies, Adverse Reactions, Alerts





             Active Allergies Criticality  Reaction | Severity Comments     Date

 

             Tetracycline Unable to assess criticality              rash        

 2010







Medications





           Active Medications SIG        Qnty       Indications Ordering Provide

r Date

 

                          Alprazolam                     0.25mg Tablets         

          1/2 - one tablet

twice daily as needed anxiety 10tabs                          NIMA Douglas 0

2021

 

                          Zolpidem Tartrate                     5mg Tablets     

              take one 

tablet by mouth at bedtime as needed for sleep maximum daily dose = 1 tablet mdd
1               7tabs                           NIMA Douglas 06/10/2021

 

                          Estrace                     0.1mg/GM Cream            

       1/4 applicatorful 

vaginally at bedtime daily x 14 days then three times a week 42.500gm           

                     NIMA Douglas                               2021

 

                    Lutein                     20mg Capsules                   1

 by mouth every day 

                                        NIMA Douglas    2021

 

                          Lisinopril                     2.5mg Tablets          

         1 by mouth every 

day             90tabs          E11.21          Olya Bates [facility] 2020

 

           Calcium + D 1200MG/D                         Josephine Escalante M.D. 

2019

 

                                        Bupropion Hydrochloride ER (XL)         

            150mg Tablets ER 24HR       

             take 1 tablet by mouth once daily 90tabs                    Olya Bates [facility] 

10/15/2019

 

                                        Fluticasone Propionate                  

   50mcg/Act Suspension                 

             2 sprays each nostril daily X 2 Weeks Then prn 16gm                

      Cait Henson,[facility] 

2019

 

                                        Fortify Probiotic Womens Extra Strength 

                     Capsules Cait Walls,[facility] 20

18

 

                          Buspirone HCL                     15mg Tablets        

           Take 1 Tablet 

By Mouth Twice Daily 180tabs                         Olya Bates [facility] 2018

 

                          Vitamin D                     1000Unit Tablets        

           2 by mouth 

every day                                       Emily Aleman, [facility] 

8

 

                          Onetouch Ultra Blue                      Strips       

            use twice 

daily to check blood sugar dx e11.9 200units                        [facility]

 

                          Womens One Daily                      Tablets         

          1 by mouth every

day                                             Eimly Aleman, [facility] 

6

 

                          Metformin HCL ER                     500mg Tablets ER 

24HR                   

take 2 tablets by mouth once daily with the largest meal of the day 180tabs     

                            

Olya Bates [facility]                        2016

 

                          Aspirin Childrens                     81mg Chewtabs   

                1 by mouth

every hs                                        [facility]

 

                          Loperamide HCL                     2mg Capsules       

            4 capsules at 

bedtime by mouth may take additional 4 capsules in daytime - not to exceed 16mg 
daily           240caps                         Olya Bates [facility] 2016

 

                          Ferrous Sulfate                     325(65Fe) mg Table

ts DR                   1 

by mouth every day                                 [facility] 

6

 

             Melatonin                     10mg Tablets                   1 hs p

o                                [facility]                             2016

 

                          Atorvastatin Calcium                     20mg Tablets 

                  Take 1 

Tablet By Mouth Every Day 90tabs                          Olya Bates [facility] 

 

                          Omeprazole                     20mg Capsules DR       

            take 1 capsule

by mouth twice daily. maximum daily dose is 2 180caps                         An

n Krystal Marley [facility] 

2010

 

                          Claritin                     10mg Tablets             

      1 by mouth every day

                                                Unknown         

 

             Similasan Earache Relief                      Solution             

                                             

Unknown                                 

 

           Omega-3 Drops For Eyes                                  Unknown    

 

                          Budesonide                     3mg Caps DR Part       

            3 tabs daily 

before dinner for four weeks, then 2 tabs daily for four weeks, then 1 tab daily
for remaining four weeks- Dr. Farmer's                                 Unknow

n         

 

                          Magnesium                     300mg Capsules          

         1 by mouth every 

day                                             Unknown         

 

                Stelara                     90mg/ml Soln Prefill Syringe        

                                            

                          Unknown                   







Medications Administered in Office





           Medication SIG        Qnty       Indications Ordering Provider Date

 

                          Covid-19 vaccine, Unspecified                      Inj

ection                    

                                                Unknown         2021

 

                          Covid-19 vaccine, Unspecified                      Inj

ection                    

                                                Unknown         2021

 

                                        Immunization Adminstration,1 Vaccine/Tox

oid                      Injection      

                                                    Cait Henson FNP 20

20







Immunizations





           CPT Code   Status     Date       Vaccine    Reaction   Lot #

 

           [id number]      Given      2020 Pneumovax 23            [id number]

 

           [id number]      Given      2020 Adacel- Tetanus Diphtheria Pertussis

 (Age65 & Under)            

[id number]

 

           U-Flu      Given      2019 Influenza,Unspecified             

 

           U-PneuC    Given      07/15/2019 Prevnar 13             

 

                [id number]           Given           07/15/2019      Shingrix Zoster 

Vaccine (HZV), Recombinant, Subunit, 

Adjuvanted                                           

 

           [id number]      Given      05/15/2019 Shingrix               

 

           U-Flu      Given      10/11/2018 Influenza,Unspecified             

 

           [id number]      Given      2017 PPD        0 mm read by Lashaun ortega, SIRENA [id number]

 

                 Given      2016 Fluvirin Virus Vaccine            16



 

           [id number]      Given      10/10/2003 Pneumovax 23             







Vital Signs





                Date            Vital           Result          Comment

 

                2021 10:32am BP Systolic     130 mmHg         

 

                    BP Diastolic        64 mmHg              

 

                    Heart Rate          88 /min              

 

                    Height              61.25 inches        5'1.25"

 

                    Weight              125.00 lb            

 

                    O2 % BldC Oximetry  96 %                 

 

                    BMI (Body Mass Index) 23.4 kg/m2           

 

                2021  8:40am BP Systolic     128 mmHg         

 

                    BP Diastolic        68 mmHg              

 

                    Heart Rate          74 /min              

 

                    Height              61.25 inches        5'1.25"

 

                    Weight              118.50 lb            

 

                    O2 % BldC Oximetry  98 %                RM Air

 

                    BMI (Body Mass Index) 22.2 kg/m2           







Results





        Test    Acquired Date Facility Test    Result  H/L     Range   Note

 

                    Laboratory test finding 2021          [address] [address] [phone] Erythrocyte Sedimentation Rate 39 mm/hr   High       0

-30        

 

                    Liver Profile       2021          [facility]

nter

                                        [address] [place] [phone] Ast/Sgot   20 U/L     Normal     7-37        

 

             Alt/SGPT     30 U/L       Normal       12-78         

 

             Alkaline Phosphatase 95 U/L       Normal               

 

             Bilirubin,Total 0.4 mg/dL    Normal       0.2-1.0       

 

             Bilirubin,Direct 0.1 mg/dL    Normal       0.0-0.2       

 

             Total Protein 6.9 GM/DL    Normal       6.4-8.2       

 

             Albumin      3.3 GM/DL    Normal       3.2-5.2       

 

             Albumin/Globulin Ratio 0.9          Low          1.2-2.2       

 

                    Basic Metabolic Profile 2021          [address] [address] [phone] Glucose, Fasting 126 mg/dL  High             

 

             Blood Urea Nitrogen 13 mg/dL     Normal       7-18          

 

             Creatinine For GFR 0.84 mg/dL   Normal       0.55-1.30     

 

             Glomerular Filtration Rate > 60.0       Normal       >45          1

 

             Sodium Level 140 mEq/L    Normal       136-145       

 

             Potassium Serum 4.4 mEq/L    Normal       3.5-5.1       

 

             Chloride Level 106 mEq/L    Normal               

 

             Carbon Dioxide Level 28 mEq/L     Normal       21-32         

 

             Anion Gap    6 mEq/L      Low          8-16          

 

             Calcium Level 9.6 mg/dL    Normal       8.8-10.2      

 

                    Total Iron Binding Capacit 2021          [address] [address] [phone] Iron (Fe)  33 g/dL  Low              

 

             Total Iron Binding Capacity 424 g/dL   Normal       250-450      

 

 

             Percent Saturation 7.8 %        Low          13.2-45.0     

 

                    Vitamin B12 & Folate 2021          [address] [address] [phone] Vitamin B12 Level 598 pg/mL  Normal                2

 

             Folate       > 24.0 NG/ML Normal                    3

 

                    Laboratory test finding 2021          [facility]

                                        [address] [place] [address] [phone] C Reactive Protein Quantitativ 0.64 mg/dL High       0

.00-0.30   

 

                    Fat Fecal Qualitative 2021          [address] [address] [phone] Fats Neutral Normal     Normal     .          4

 

             Fats Total   Normal       Normal       .            5

 

                    Laboratory test finding 2021          [address] [address] [phone] Pancreatic Elastase Stool TNP        Normal           

     6

 

             Calprotectin Stool 40 ug/g      Normal       0-120        7

 

                    CBC With Differential 2021          [address] [address] [phone] White Blood Count 10.4 10    High       4.0-10.0    

 

             Red Blood Count 4.13 10      Normal       4.00-5.40     

 

             Hemoglobin   11.9 g/dL    Low          12.0-15.5     

 

             Hematocrit   36.9 %       Normal       36.0-47.0     

 

             Mean Corpuscular Volume 89.3 fl      Normal       80.0-96.0     

 

             Mean Corpuscular Hemoglobin 28.8 pg      Normal       27.0-33.0    

 

 

             Mean Corpuscular HGB Conc 32.2 g/dL    Normal       32.0-36.5     

 

             Red Cell Distribution Width 19.7 %       High         11.5-14.5    

 

 

             Platelet Count, Automated 454 10       High         150-450       

 

             Neutrophils % 68.2 %       High         36.0-66.0     

 

             Lymph %      21.9 %       Low          24.0-44.0     

 

             Mono %       8.1 %        High         2.0-8.0       

 

             Eos %        0.7 %        Normal       0.0-3.0       

 

             Baso %       0.6 %        Normal       0.0-1.0       

 

             Immature Granulocyte % 0.5 %        Normal       0-3.0         

 

             Nucleated Red Blood Cell % 0.0 %        Normal       0-0           

 

             Neutrophils # 7.1 10       Normal       1.5-8.5       

 

             Lymph #      2.3 10       Normal       1.5-5.0       

 

             Mono #       0.9 10       High         0.0-0.8       

 

             Eos #        0.1 10       Normal       0.0-0.5       

 

             Baso #       0.1 10       Normal       0.0-0.2       

 

                    CBC With Differential 2021          [address] [address] [phone] White Blood Count 15.0 10    High       4.0-10.0    

 

             Red Blood Count 4.63 10      Normal       4.00-5.40     

 

             Hemoglobin   12.9 g/dL    Normal       12.0-15.5     

 

             Hematocrit   39.2 %       Normal       36.0-47.0     

 

             Mean Corpuscular Volume 84.7 fl      Normal       80.0-96.0     

 

             Mean Corpuscular Hemoglobin 27.9 pg      Normal       27.0-33.0    

 

 

             Mean Corpuscular HGB Conc 32.9 g/dL    Normal       32.0-36.5     

 

             Red Cell Distribution Width 19.1 %       High         11.5-14.5    

 

 

             Platelet Count, Automated 319 10       Normal       150-450       

 

             Neutrophils % 92.5 %       High         36.0-66.0     

 

             Lymph %      5.4 %        Low          24.0-44.0     

 

             Mono %       0.7 %        Low          2.0-8.0       

 

             Eos %        0.0 %        Normal       0.0-3.0       

 

             Baso %       0.1 %        Normal       0.0-1.0       

 

             Immature Granulocyte % 1.3 %        Normal       0-3.0         

 

             Nucleated Red Blood Cell % 0.0 %        Normal       0-0           

 

             Neutrophils # 13.9 10      High         1.5-8.5       

 

             Lymph #      0.8 10       Low          1.5-5.0       

 

             Mono #       0.1 10       Normal       0.0-0.8       

 

             Eos #        0.0 10       Normal       0.0-0.5       

 

             Baso #       0.0 10       Normal       0.0-0.2       

 

                    Laboratory test finding 2021          [address] [address] [phone]  Jak2 Mutations For Path Sendou See Pathology Re 

<SEE NOTE>  

Normal                                              8

 

                    BCR/Abl Screen For CML Path 2021          [address] [address] [phone]  BCR/Abl Screen For CML Path So See Pathology Re 

<SEE NOTE>  

Normal                                              9

 

                    Laboratory test finding 2021          [address] [address] [phone] Vitamin B12 Level 580 pg/mL  Normal     247-911    10

 

             Ferritin     19 NG/ML     Normal       8-252         

 

                    Total Iron Binding Capacit 2021          [address] [address] [phone] Iron (Fe)  139 g/dL Normal           

 

             Total Iron Binding Capacity 494 g/dL   High         250-450      

 

 

             Percent Saturation 28.1 %       Normal       13.2-45.0     

 

                    Comprehensive Metabolic Profil 2021          [address] [address] [phone] Glucose, Fasting 121 mg/dL  High             

 

             Blood Urea Nitrogen 27 mg/dL     High         7-18          

 

             Creatinine For GFR 1.00 mg/dL   Normal       0.55-1.30     

 

             Glomerular Filtration Rate 58.9         Normal       >45          1

1

 

             Sodium Level 137 mEq/L    Normal       136-145       

 

             Potassium Serum 4.2 mEq/L    Normal       3.5-5.1       

 

             Chloride Level 106 mEq/L    Normal               

 

             Carbon Dioxide Level 25 mEq/L     Normal       21-32         

 

             Anion Gap    6 mEq/L      Low          8-16          

 

             Calcium Level 8.9 mg/dL    Normal       8.8-10.2      

 

             Ast/Sgot     26 U/L       Normal       7-37          

 

             Alt/SGPT     40 U/L       Normal       12-78         

 

             Alkaline Phosphatase 79 U/L       Normal               

 

             Bilirubin,Total 0.5 mg/dL    Normal       0.2-1.0       

 

             Total Protein 7.5 GM/DL    Normal       6.4-8.2       

 

             Albumin      3.4 GM/DL    Normal       3.2-5.2       

 

             Albumin/Globulin Ratio 0.8          Low          1.2-2.2       

 

                    Complete Blood Count 2021          [facility]

s, pc

: Dr Dhaval Miranda

[place] [address] [phone] WBC        9.2 x10*3/UL            4.1 - 10.9 12

 

             RBC          4.37 x10*6/UL              4.20 - 6.30   

 

             Hemoglobin   12.3 g/dL                 12.0 - 18.0   

 

             Hematocrit   36.6 %       Low          37.0 - 51.0   

 

             MCV          83.6 fL                   80.0 - 97.0   

 

             MCH          28.2 pg                   26.0 - 32.0   

 

             MCHC         33.7 g/dL                 31.0 - 38.0   

 

             RDW          15.5 %       High         11.6 - 13.7   

 

             PLT          506 x10*3/UL High         140 - 440     

 

             MPV          7.1 FL       Low          7.8 - 11.0    

 

             Lymph %      19.4 %                    10.0 - 58.5   

 

             Mid %        5.4 %                     1.7 - 9.3     

 

             Neut %       75.2 %                    37.0 - 92.0   

 

             Lymph #      1.7 x10*3/UL              0.6 - 4.1     

 

             Mid #        0.6 x10*3/UL              0.1 - 0.6     

 

             Neut #       6.9 x10*3/UL              2.0 - 7.8     

 

                    Laboratory test finding 2021          [facility]

[facility], [facility]

: Dr Dhaval Miranda

[place] [address] [phone] Magnesium  1.9 mg/dL             1.8 - 2.4   

 

                    Lipid Profile       2021          [facility]

, 

: Dr Dhaval Miranda

[place] [address] [phone] Cholesterol 143 mg/dL             131 - 200   

 

             Triglycerides 105 mg/dL                 30 - 150      

 

             HDL Cholesterol 89 mg/dL     High         35 - 60       

 

             LDL (Calculated) 33 CALC      Low          50 - 159      

 

                    Comprehensive Chem Profile 2021          [facility]

[facility], 

: Dr Dhaval Miranda

[place] [address] [phone] Glucose    94 mg/dL              74 - 99    13

 

             BUN          19 mg/dL     High         7 - 18        

 

             Creatinine   1.0 mg/dL                 0.6 - 1.3     

 

             Sodium       142 mEq/L                 136 - 145     

 

             Potassium    4.1 mEq/L                 3.5 - 5.1     

 

             Chloride     104 mEq/L                 98 - 107      

 

             Carbon Dioxide 30 mEq/L                  21 - 32       

 

             Calcium      9.0 mg/dL                 8.5 - 10.1    

 

             Alk. Phosphatase 112 mg/dL                 46 - 116      

 

             Total Bilirubin 0.3 mg/dL                 0.2 - 1.0     

 

             Ast (Sgot)   17 U/L                    15 - 37       

 

             Alt (SGPT)   23 U/L                    12 - 78       

 

             Albumin      3.0 g/dL     Low          3.4 - 5.0     

 

             Total Protein 7.4 g/dL                  6.4 - 8.2     

 

             A/G Ratio    0.68 CALC    Low          1.00 - 1.90   

 

             GFR  55 mL/min    Low          >60           

 

             GFR African American >= 60 mL/min              >60          14

 

                    A1c                 2021          [facility]

, pc

: Dr Dhaval Miranda

[place] [id number] [phone] Hba1c      6.0 %      High       <5.7       15

 

             Est Avg Glucose 125 mg/dL    High         60 - 110      

 

                    Laboratory test finding 2021          [facility]

                                        [address] [place] [address] [phone] Blood Urea Nitrogen 16 mg/dL   Normal     7-18        

 

                    Creatinine With GFR 2021          [facility]

nter

                                        [address] [place] [address] [phone] Creatinine For GFR 0.80 mg/dL Normal     0.55-1.30   

 

             Glomerular Filtration Rate > 60.0       Normal       >45          1

6

 

                    CBC With Differential 2021          [facility]

                                        [address] [place] [address] [phone] White Blood Count 10.1 10    High       4.0-10.0    

 

             Red Blood Count 4.32 10      Normal       4.00-5.40     

 

             Hemoglobin   11.7 g/dL    Low          12.0-15.5     

 

             Hematocrit   36.6 %       Normal       36.0-47.0     

 

             Mean Corpuscular Volume 84.7 fl      Normal       80.0-96.0     

 

             Mean Corpuscular Hemoglobin 27.1 pg      Normal       27.0-33.0    

 

 

             Mean Corpuscular HGB Conc 32.0 g/dL    Normal       32.0-36.5     

 

             Red Cell Distribution Width 17.5 %       High         11.5-14.5    

 

 

             Platelet Count, Automated 510 10       High         150-450       

 

             Neutrophils % 70.4 %       High         36.0-66.0     

 

             Lymph %      20.5 %       Low          24.0-44.0     

 

             Mono %       7.0 %        Normal       2.0-8.0       

 

             Eos %        1.1 %        Normal       0.0-3.0       

 

             Baso %       0.6 %        Normal       0.0-1.0       

 

             Immature Granulocyte % 0.4 %        Normal       0-3.0         

 

             Nucleated Red Blood Cell % 0.0 %        Normal       0-0           

 

             Neutrophils # 7.1 10       Normal       1.5-8.5       

 

             Lymph #      2.1 10       Normal       1.5-5.0       

 

             Mono #       0.7 10       Normal       0.0-0.8       

 

             Eos #        0.1 10       Normal       0.0-0.5       

 

             Baso #       0.1 10       Normal       0.0-0.2       

 

                    Laboratory test finding 2021          [address] [address] [phone] Erythrocyte Sedimentation Rate 44 mm/hr   High       0

-30        

 

                    Total Iron Binding Capacit 2021          [facility]

                                        [address] [place] [phone] Iron (Fe)  37 g/dL  Low              

 

             Total Iron Binding Capacity 383 g/dL   Normal       250-450      

 

 

             Percent Saturation 9.7 %        Low          13.2-45.0     

 

                    Vitamin B12 & Folate 2021          [address] [address] [phone] Vitamin B12 Level 764 pg/mL  Normal                17

 

             Folate       20.3 NG/ML   Normal                    18

 

                    Laboratory test finding 2021          [address] [address] [phone] C Reactive Protein Quantitativ 0.66 mg/dL High       0

.00-0.30   

 

                    Quanteferon TB Gold Test 2021          [address] [address] [phone] QuantiFERON Criteria (SEE NOTE)  Normal     .         

 19

 

             QuantiFERON TB1 Ag Value 0.05 IU/mL   Normal       .             

 

             QuantiFERON TB2 Ag Value 0.05 IU/mL   Normal       .             

 

             QuantiFERON Nil Value 0.06 IU/mL   Normal       .             

 

             QuantiFERON Mitogen Value >10.00 IU/mL Normal       .             

 

             QuantiFERON-TB Gold Plus Negative     Normal       Negative     20

 

                    Laboratory test finding 2021          [address] [address] [phone] Hepatitis B Surf AB Quant 4.9 mIU/mL Low        Immuni

ty>9.9 21

 

             Hepatitis B Core Antibody Igg Negative     Normal       Negative   

  22

 

                    Prometh Monitr Crohns's Disease 2021          [address] [address] [phone] Monitr Crohn's Disease SEE SEPARATE REP <SEE NOTE>  No

rmal                23

 

                    IBD Reflex Crohns Prognostic 2021          [address] [place] [address]

           [phone] Ibdser1    SEE SEPARATE REP <SEE NOTE>  Normal        

        24

 

             Crohn1       SEE SEPARATE REP <SEE NOTE>  Normal                   

 25







                          1                         Units are mL/min/1.73 m2



Chronic Kidney Disease Staging per NKF:



Stage I & II   GFR >=60       Normal to Mildly Decreased

Stage III      GFR 30-59      Moderately Decreased

Stage IV       GFR 15-29      Severely Decreased

Stage V        GFR <15        Very Little GFR Left

ESRD           GFR <15 on RRT



 

                          2                         VITAMIN B12 NORMAL RANGE



NORMAL                     247 - 911 PG/ML

INDETERMINATE              211 - 246 PG/ML

DEFICIENT              LESS THAN 211 PG/ML



 

                          3                         FOLATE NORMAL RANGE



NORMAL             GREATER THAN 5.4 NG/ML

INDETERMINATE               3.4-5.4 NG/ML

DEFICIENT             LESS THAN 3.4 NG/ML



 

                          4                         Normal (<60 Droplets/HPF)



 

                          5                         Normal (<100 Droplets/HPF)



 

                          6                         Test not performed. Consiste

ncy of stool specimen too watery

for analysis.

TEST: [id number] Pancreatic Elastase, Fecal



Testing performed at reference lab . Report copy to follow

on a separate form. 21 REF LAB#:[id number]



 

                          7                         Concentration     Interpreta

tion   Follow-Up

<16 - 50 ug/g     Normal           None

>50 -120 ug/g     Borderline       Re-evaluate in 4-6 weeks

>120 ug/g     Abnormal         Repeat as clinically

indicated



 

                          8                         See Pathology Report

Specimen Collection for Pathology Reference Lab Testing.

Refer to [facility] Pathology Report for Results: [id number]



 

                          9                         See Pathology Report

Specimen Collection for Pathology Reference Lab Testing.

Refer to [facility] Pathology Report for Results:[id number]



 

                          10                        VITAMIN B12 NORMAL RANGE



NORMAL                     247 - 911 PG/ML

INDETERMINATE              211 - 246 PG/ML

DEFICIENT              LESS THAN 211 PG/ML



 

                          11                        Units are mL/min/1.73 m2



Chronic Kidney Disease Staging per NKF:



Stage I & II   GFR >=60       Normal to Mildly Decreased

Stage III      GFR 30-59      Moderately Decreased

Stage IV       GFR 15-29      Severely Decreased

Stage V        GFR <15        Very Little GFR Left

ESRD           GFR <15 on RRT



 

                          12                        NOTE:

CBC VERIFIED







 

                          13                        100-125 mg/dL     PRE-DIABET

ES/FASTING

>126 mg/dL          DIABETES/FASTING



 

                          14                        CHRONIC KIDNEY DISEASE STAGI

NG PER NKF



STAGE I & II      GFR >= 60        NORMAL TO MILDLY DECREASED

STAGE III          GFR 30-59          MODERATELY DECREASED

STAGE IV           GFR 15-29         SEVERELY DECREASED

STAGE V            GFR <15            VERY LITTLE GFR LEFT

ESRD                 GFR <15            ON RRT



 

                          15                        Lab Result Notes:

Pre-Diabetes   5.7 - 6.4 %

Diabetes           = or > 6.5%



 

                          16                        Units are mL/min/1.73 m2



Chronic Kidney Disease Staging per NKF:



Stage I & II   GFR >=60       Normal to Mildly Decreased

Stage III      GFR 30-59      Moderately Decreased

Stage IV       GFR 15-29      Severely Decreased

Stage V        GFR <15        Very Little GFR Left

ESRD           GFR <15 on RRT



 

                          17                        VITAMIN B12 NORMAL RANGE



NORMAL                     247 - 911 PG/ML

INDETERMINATE              211 - 246 PG/ML

DEFICIENT              LESS THAN 211 PG/ML



 

                          18                        FOLATE NORMAL RANGE



NORMAL             GREATER THAN 5.4 NG/ML

INDETERMINATE               3.4-5.4 NG/ML

DEFICIENT             LESS THAN 3.4 NG/ML



 

                          19                        .

The QuantiFERON-TB Gold Plus result is determined by

subtracting the Nil value from either TB antigen (Ag) tube.

The mitogen tube serves as a control for the test.



 

                          20                        The specimen received for Qu

antiFERON testing was incubated

by the ordering institution. Specific procedures outlined

in our Directory of Services and in the package insert for

the QuantiFERON Gold (In Tube) test must be followed to

enable for proper stimulation of cells for the production

of interferon gamma. Chemiluminescence immunoassay

methodology

Performed at:  [facility] [place]  [id number]

: Araceli B Reyes MD, Phone:  [phone]



 

                          21                        Status of Immunity          

           Anti-HBs Level

                                        ------------------                     -

-------------

Inconsistent with Immunity                   0.0 - 9.9

Consistent with Immunity                          >9.9



 

                          22                        Performed at:  [facility] [place]  [id number]

: Araceli B Reyes MD, Phone:  [phone]



 

                          23                        SEE SEPARATE REPORT



 

                          24                        SEE SEPARATE REPORT



 

                          25                        SEE SEPARATE REPORT

Testing performed at reference lab . Report copy to follow

on a separate form. 21 REF LAB#:[id number]









Procedures





                Date            Code            Description     Status

 

                2021      20497           Chronic Care Management Services

 Ea Addl 20 Min Completed

 

                    2021          17069               Chronic Care MGMT 20

 Mins Clinical Staff Time Per Calendar 

Month                                   Completed

 

                2021      81014           Office/Outpatient Established Mo

d MDM 30-39 Min Completed

 

                    2021          51870               Chronic Care MGMT 20

 Mins Clinical Staff Time Per Calendar 

Month                                   Completed

 

                2021      41695           Office/Outpatient Established Lo

w MDM 20-29 Min Completed

 

                2021      [id number]       Diabetic Retinal Eye Exam Comple

[facility]

 

                2020      [id number]       Bone Mineral Density Test Comple

[facility]

 

                2020      [id number]        Mammogram       Completed

 

                2019      [id number]       Diabetic Retinal Eye Exam Comple

[facility]

 

                2018      [id number]       Bone Mineral Density Test Comple

[facility]

 

                2018      [id number]        Mammogram       Completed

 

                2016      [id number]        Mammogram       Completed

 

                2016      [id number]        Mammogram       Completed

 

                2014      [id number]        Mammogram       Completed

 

                2011      [id number]        Mammogram       Completed

 

                2011      [id number]       Bone Mineral Density Test Comple

[facility]

 

                2010      [id number]       Diabetic Foot Exam Completed

 

                2010      [id number]        Mammogram       Completed

 

                10/22/2008      [id number]       Bone Mineral Density Test Comple

[facility]

 

                2006      [id number]        Mammogram       Completed







[employer]





                                        Description

 

                                        No Information Available







Encounters





           Type       Date       Location   Provider   Dx         Diagnosis

 

             Office Visit 2021  9:00a [facility] Olya Hutchins, FNP 

E11.21                                  Type 2 diabetes mellitus with diabetic n

ephropathy

 

                          N18.31                    Chronic kidney disease, stag

e 3a

 

                          D69.6                     Thrombocytopenia, unspecifie

d

 

                          K21.9                     Gastro-esophageal reflux dis

ease without esophagitis

 

                          E78.00                    Pure hypercholesterolemia, u

nspecified

 

                          E55.9                     Vitamin D deficiency, unspec

ified

 

                          F41.9                     Anxiety disorder, unspecifie

d

 

                          D50.9                     Iron deficiency anemia, unsp

ecified

 

                          K50.918                   Crohn's disease, unspecified

, with other complication

 

                          N95.2                     Postmenopausal atrophic vagi

nitis

 

                          E78.2                     Mixed hyperlipidemia

 

                          Z93.2                     Ileostomy status

 

             Office Visit 2021  2:20p [facility] Olya Hutchins, FNP 

E11.21                                  Type 2 diabetes mellitus with diabetic n

ephropathy

 

                          N18.31                    Chronic kidney disease, stag

e 3a

 

                          Z93.2                     Ileostomy status

 

                          K21.9                     Gastro-esophageal reflux dis

ease without esophagitis

 

                          E78.00                    Pure hypercholesterolemia, u

nspecified

 

                          E55.9                     Vitamin D deficiency, unspec

ified

 

                          F41.9                     Anxiety disorder, unspecifie

d

 

                          D50.9                     Iron deficiency anemia, unsp

ecified

 

                          K50.918                   Crohn's disease, unspecified

, with other complication

 

                          H93.13                    Tinnitus, bilateral

 

                          H61.23                    Impacted cerumen, bilateral







Assessments





                Date            Code            Description     Provider

 

                2021      E11.21          Type 2 diabetes mellitus with di

abetic nephropathy Olya Krystal Marley, [facility]

 

                2021      N18.31          Chronic kidney disease, stage 3a

 Olya Krystal Marley, [facility]

 

                2021      K21.9           Gastro-esophageal reflux disease

 without esophagitis Olya Krystal Marley, [facility]

 

                2021      E55.9           Vitamin D deficiency, unspecifie

d Olya Krystal Marley, [facility]

 

                2021      F41.9           Anxiety disorder, unspecified An

n Krystal Marley, [facility]

 

                2021      D50.9           Iron deficiency anemia, unspecif

ied Olya Krystal Marley, [facility]

 

                2021      K50.918         Crohn's disease, unspecified, wi

th other complication Olya Krystal Marley, [facility]

 

                2021      Z93.2           Ileostomy status Olya Bates, [facility]

 

                2021      M85.80          Other specified disorders of bon

e density and structure, uns 

Olya Rice LoÃ­za, [facility]

 

                2021      E78.1           Pure hyperglyceridemia Olya Daigle

ne, [facility]

 

                2021      N18.31          Chronic kidney disease, stage 3a

 Dhaval Miranda MD

 

                2021      K21.9           Gastro-esophageal reflux disease

 without esophagitis Dhaval Miranda MD

 

                2021      E78.00          Pure hypercholesterolemia, unspe

cified Dhaval Miranda MD

 

                2021      E11.21          Type 2 diabetes mellitus with di

abetic nephropathy Olya Bates, [facility]

 

                2021      N18.31          Chronic kidney disease, stage 3a

 Olya Bates, [facility]

 

                2021      D69.6           Thrombocytopenia, unspecified An

n Le LoÃ­za, [facility]

 

                2021      K21.9           Gastro-esophageal reflux disease

 without esophagitis Olya Bates, [facility]

 

                2021      E78.00          Pure hypercholesterolemia, unspe

cified Olya Bates, [facility]

 

                2021      E55.9           Vitamin D deficiency, unspecifie

d Olya Le LoÃ­za, [facility]

 

                2021      F41.9           Anxiety disorder, unspecified An

n Le LoÃ­za, [facility]

 

                2021      D50.9           Iron deficiency anemia, unspecif

ied Olya Le Pine, [facility]

 

                2021      K50.918         Crohn's disease, unspecified, wi

th other complication Olya Le 

Pine, [facility]

 

                2021      N95.2           Postmenopausal atrophic vaginiti

s Olya Le [place], [facility]

 

                2021      E78.2           Mixed hyperlipidemia Olya Le Pine

, [facility]

 

                2021      Z93.2           Ileostomy status Olya Le [place], 

P

 

                2021      E11.21          Type 2 diabetes mellitus with di

abetic nephropathy Dhaval Miranda MD

 

                2021      N18.31          Chronic kidney disease, stage 3a

 Dhaval Miranda MD

 

                2021      K21.9           Gastro-esophageal reflux disease

 without esophagitis Dhaval Miranda MD

 

                2021      E78.00          Pure hypercholesterolemia, unspe

cified Dhaval Miranda MD

 

                2021      E11.21          Type 2 diabetes mellitus with di

abetic nephropathy Olya Le 

LoÃ­za, [facility]

 

                2021      N18.31          Chronic kidney disease, stage 3a

 Olya Le LoÃ­za, [facility]

 

                2021      Z93.2           Ileostomy status Olya Le [place], 

P

 

                2021      K21.9           Gastro-esophageal reflux disease

 without esophagitis Olya Le 

LoÃ­za, [facility]

 

                2021      E78.00          Pure hypercholesterolemia, unspe

cified Olya Le LoÃ­za, [facility]

 

                2021      E55.9           Vitamin D deficiency, unspecifie

d Olya Le LoÃ[place]za, [facility]

 

                2021      F41.9           Anxiety disorder, unspecified An

n Le Lo[place]za, [facility]

 

                2021      D50.9           Iron deficiency anemia, unspecif

ied Olya Le [place], [facility]

 

                2021      K50.918         Crohn's disease, unspecified, wi

th other complication Olya Le 

Pine, [facility]

 

                2021      H93.13          Tinnitus, bilateral Olya Le [place],

 [facility]

 

                2021      H61.23          Impacted cerumen, bilateral [facility]







Plan of Treatment

Future Appointment(s):* 2021  8:00 am - [facility] Douglas at [facility]

* 2022  9:00 am - Nurse #2 at [facility]

* 2022  9:20 am - [facility] at [facility]

2021 - [facility]* E11.21 Type 2 diabetes mellitus with diabetic 
  nephropathy* Comments:* Last A1C at goal.





* N18.31 Chronic kidney disease, stage 3a* Comments:* Last BMP acceptable.





* K21.9 Gastro-esophageal reflux disease without esophagitis* Comments:* 
  asymptomatic on PPI





* E55.9 Vitamin D deficiency, unspecified* Comments:* continue supplementation.





* F41.9 Anxiety disorder, unspecified* Comments:* Had a recent bout with 
  increased anxiety due to the situation of having Her dog put to sleep and the 
  move.  However, is doing well now on the Bupropion.





* D50.9 Iron deficiency anemia, unspecified* Comments:* She has been referred to
  hematology due to her chronic elevated platelets.  It looks like from their 
  review they feel it is related to her inflammatory bowel disease.  Her most 
  recent iron saturation level remains quite low.  She does remain on oral iron.
   She may be someone who needs to have IV infusions.





* K50.918 Crohn's disease, unspecified, with other complication* Comments:* 
  Follows with Dr. Farmer.  Has had her initial IV infusion of Stelara but is
  having a difficult time getting her home dose.





* Z93.2 Ileostomy status

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
  * Adequate calcium (1000mg) and  vitamin D 1000 units daily discussed.





* E78.1 Pure hyperglyceridemia* Comments:* Tolerating statin therapy without 
  adverse effects.









Functional Status





                                        Description

 

                                        No Information Available







Mental Status





                                        Description

 

                                        No Information Available







Referrals





                Refer to      Reason for Referral Status          Appt Date

 

                [facility] Hematology/Oncology NP CONSULT FOR ELEVATED PLATELETS  Tieshae

nt Notified 

2021

 

                                        [address] [place]  [address] [phone]

## 2021-10-28 NOTE — CCD
Continuity of Care Document (CCD)

                             Created on: 2021



Vanesa Kinsey

External Reference #: [id number]

: 1955

Sex: Female



Demographics





                          Address                   [address] [place] [address]

 

                          Home Phone                [phone]

 

                          Preferred Language        Unknown

 

                          Marital Status            Unknown

 

                          Congregational Affiliation     Unknown

 

                          Race                      White

 

                          Ethnic Group              Not  or 





Author





                          Organization              Unknown

 

                          Address                   Unknown

 

                          Phone                     Unavailable







Care Team Providers





                    Care Team Member Name Role                Phone

 

                    Jessica Cervantes MD    AUTM                [phone]

 

                    Olya Bangura   AUTM                [phone]

 

                    Israel Farmer MD AUTM                [phone]







Problems





                    Active Problems     Provider            Date

 

                    Pulmonary embolism  NIMA Douglas    Onset: 04/10/2012

 

                    Gastroesophageal reflux disease LUCRETIA Ring Onset

: 04/10/2012

 

                    Pure hypercholesterolemia LUCRETIA Ring Onset: 

 

                    Type 2 diabetes mellitus LUCRETIA Ring Onset: 04/10

/2012

 

                    Anemia              Marc Rodriguez D.O. Onset: 04/10/

2012







Social History





                Type            Date            Description     Comments

 

                Birth Sex                       Unknown          

 

                ETOH Use                        Occasionally consumes beer  

 

                Tobacco Use     Start: Unknown End: Unknown Patient is a former 

smoker SMOKED FOR 

15YRS 1 MAGGY A DAY quit age 37 







Allergies, Adverse Reactions, Alerts





             Active Allergies Reaction     Severity     Comments     Date

 

             Tetracycline                           rash         2010







Medications





           Active Medications SIG        Qnty       Indications Ordering Provide

r Date

 

                          Alprazolam                     0.25mg Tablets         

          1/2 - one tablet

twice daily as needed anxiety 10tabs                          NIMA Douglas 0

2021

 

                          Zolpidem Tartrate                     5mg Tablets     

              take one 

tablet by mouth at bedtime as needed for sleep maximum daily dose = 1 tablet mdd
1               7tabs                           NIMA Douglas 06/10/2021

 

                          Estrace                     0.1mg/GM Cream            

       1/4 applicatorful 

vaginally at bedtime daily x 14 days then three times a week 42.500gm           

                     NIMA Douglas                               2021

 

                    Lutein                     20mg Capsules                   1

 by mouth every day 

                                        NIMA Douglas    2021

 

                          Lisinopril                     2.5mg Tablets          

         1 by mouth every 

day             90tabs          E11.21          Olya Bates [facility] 2020

 

           Calcium + D 1200MG/D                         Josephine Escalante M.D. 

2019

 

                                        Bupropion Hydrochloride ER (XL)         

            150mg Tablets ER 24HR       

             take 1 tablet by mouth once daily 90tabs                    Olya Bates [facility] 

10/15/2019

 

                                        Fluticasone Propionate                  

   50mcg/Act Suspension                 

             2 sprays each nostril daily X 2 Weeks Then prn 16gm                

      [facility] 

2019

 

                                        Fortify Probiotic Womens Extra Strength 

                     Capsules Cait Walls,[facility] 20

18

 

                          Buspirone HCL                     15mg Tablets        

           take 1 tablet 

by mouth twice daily 180tabs                         Olya Bates [facility] 2018

 

                          Vitamin D                     1000Unit Tablets        

           2 by mouth 

every day                                       Emily Aleman, [facility] 

8

 

                          Onetouch Ultra Blue                      Strips       

            use twice 

daily to check blood sugar dx e11.9 200units                        [facility] 10/11/2017

 

             Melatonin                     10mg Tablets                   1 hs p

o                                [facility]                             2016

 

                          Metformin HCL ER                     500mg Tablets ER 

24HR                   

take 2 tablets by mouth once daily with the largest meal of the day 180tabs     

                            

Olya Bates [facility]                        2016

 

                          Aspirin Childrens                     81mg Chewtabs   

                1 by mouth

every hs                                        [facility] 2016

 

                          Loperamide HCL                     2mg Capsules       

            4 capsules at 

bedtime by mouth may take additional 4 capsules in daytime - not to exceed 16mg 
daily           240caps                         Olya Bates [facility] 2016

 

                          Ferrous Sulfate                     325(65Fe) mg Table

ts DR                   1 

by mouth every day                                 [facility] 

6

 

                          Womens One Daily                      Tablets         

          1 by mouth every

day                                             Emily Aleman, [facility] 

6

 

                          Atorvastatin Calcium                     20mg Tablets 

                  take 1 

tablet by mouth every day 90tabs                          Olya Bates [facility] 

 

                          Omeprazole                     20mg Capsules DR       

            take 1 capsule

by mouth twice daily. maximum daily dose is 2 180caps                         Shanice Bates [facility] 

2010

 

                          Claritin                     10mg Tablets             

      1 by mouth every day

                                                Unknown         

 

             Similasan Earache Relief                      Solution             

                                             

Unknown                                 

 

           Omega-3 Drops For Eyes                                  Unknown    

 

                          Budesonide                     3mg Caps DR Part       

            3 tabs daily 

before dinner for four weeks, then 2 tabs daily for four weeks, then 1 tab daily
for remaining four weeks- Dr. Farmer's                                 Unknow

n         

 

                          Magnesium                     300mg Capsules          

         1 by mouth every 

day                                             Unknown         







Medications Administered in Office





           Medication SIG        Qnty       Indications Ordering Provider Date

 

                          Covid-19 vaccine, Unspecified                      Inj

ection                    

                                                Unknown         2021

 

                          Covid-19 vaccine, Unspecified                      Inj

ection                    

                                                Unknown         2021

 

                                        Immunization Adminstration,1 Vaccine/Tox

oid                      Injection      

                                                    Cait Henson FNP 20

20







Immunizations





           CPT Code   Status     Date       Vaccine    Reaction   Lot #

 

           [id number]      Given      2020 Pneumovax 23            [id number]

 

           [id number]      Given      2020 Adacel- Tetanus Diphtheria Pertussis

 (Age65 & Under)            

[id number]

 

           U-Flu      Given      2019 Influenza,Unspecified             

 

           U-PneuC    Given      07/15/2019 Prevnar 13             

 

                [id number]           Given           07/15/2019      Shingrix Zoster 

Vaccine (HZV), Recombinant, Subunit, 

Adjuvanted                                           

 

           [id number]      Given      05/15/2019 Shingrix               

 

           U-Flu      Given      10/11/2018 Influenza,Unspecified             

 

           [id number]      Given      2017 PPD        0 mm read by Lashaun ortega, SIRENA [id number]

 

                 Given      2016 Fluvirin Virus Vaccine            16

[id number]

 

           [id number]      Given      10/10/2003 Pneumovax 23             







Vital Signs





                Date            Vital           Result          Comment

 

                2021  8:40am BP Systolic     128 mmHg         

 

                    BP Diastolic        68 mmHg              

 

                    Heart Rate          74 /min              

 

                    Height              61.25 inches        5'1.25"

 

                    Weight              118.50 lb            

 

                    O2 % BldC Oximetry  98 %                RM Air

 

                    BMI (Body Mass Index) 22.2 kg/m2           

 

                2021  2:23pm BP Systolic     144 mmHg         

 

                    BP Diastolic        68 mmHg              

 

                    BP Systolic Recheck 146 mmHg             

 

                    BP Diastolic Recheck 62 mmHg              

 

                    Heart Rate          88 /min              

 

                    Height              61.25 inches        5'1.25"

 

                    Weight              116.00 lb            

 

                    BMI (Body Mass Index) 21.7 kg/m2           







Results





        Test    Acquired Date Facility Test    Result  H/L     Range   Note

 

                    Laboratory test finding 2021          [facility]

                                        [address] [place] [address] [phone] Erythrocyte Sedimentation Rate 39 mm/hr   High       0

-30        

 

                    Liver Profile       2021          [facility]

nter

                                        [address] [place] [address] [phone] Ast/Sgot   20 U/L     Normal     7-37        

 

             Alt/SGPT     30 U/L       Normal       12-78         

 

             Alkaline Phosphatase 95 U/L       Normal               

 

             Bilirubin,Total 0.4 mg/dL    Normal       0.2-1.0       

 

             Bilirubin,Direct 0.1 mg/dL    Normal       0.0-0.2       

 

             Total Protein 6.9 GM/DL    Normal       6.4-8.2       

 

             Albumin      3.3 GM/DL    Normal       3.2-5.2       

 

             Albumin/Globulin Ratio 0.9          Low          1.2-2.2       

 

                    Basic Metabolic Profile 2021          [address] [address] [phone] Glucose, Fasting 126 mg/dL  High             

 

             Blood Urea Nitrogen 13 mg/dL     Normal       7-18          

 

             Creatinine For GFR 0.84 mg/dL   Normal       0.55-1.30     

 

             Glomerular Filtration Rate > 60.0       Normal       >45          1

 

             Sodium Level 140 mEq/L    Normal       136-145       

 

             Potassium Serum 4.4 mEq/L    Normal       3.5-5.1       

 

             Chloride Level 106 mEq/L    Normal               

 

             Carbon Dioxide Level 28 mEq/L     Normal       21-32         

 

             Anion Gap    6 mEq/L      Low          8-16          

 

             Calcium Level 9.6 mg/dL    Normal       8.8-10.2      

 

                    Total Iron Binding Capacit 2021          [facility]

                                        [address] [place] [address] [phone] Iron (Fe)  33 g/dL  Low              

 

             Total Iron Binding Capacity 424 g/dL   Normal       250-450      

 

 

             Percent Saturation 7.8 %        Low          13.2-45.0     

 

                    Vitamin B12 & Folate 2021          [facility]

                                        [address] [place] [address] [phone] Vitamin B12 Level 598 pg/mL  Normal                2

 

             Folate       > 24.0 NG/ML Normal                    3

 

                    Laboratory test finding 2021          [address]

[place] [address] [phone] C Reactive Protein Quantitativ 0.64 mg/dL High       0

.00-0.30   

 

                    CBC With Differential 2021          [address] [address] [phone] White Blood Count 10.4 10    High       4.0-10.0    

 

             Red Blood Count 4.13 10      Normal       4.00-5.40     

 

             Hemoglobin   11.9 g/dL    Low          12.0-15.5     

 

             Hematocrit   36.9 %       Normal       36.0-47.0     

 

             Mean Corpuscular Volume 89.3 fl      Normal       80.0-96.0     

 

             Mean Corpuscular Hemoglobin 28.8 pg      Normal       27.0-33.0    

 

 

             Mean Corpuscular HGB Conc 32.2 g/dL    Normal       32.0-36.5     

 

             Red Cell Distribution Width 19.7 %       High         11.5-14.5    

 

 

             Platelet Count, Automated 454 10       High         150-450       

 

             Neutrophils % 68.2 %       High         36.0-66.0     

 

             Lymph %      21.9 %       Low          24.0-44.0     

 

             Mono %       8.1 %        High         2.0-8.0       

 

             Eos %        0.7 %        Normal       0.0-3.0       

 

             Baso %       0.6 %        Normal       0.0-1.0       

 

             Immature Granulocyte % 0.5 %        Normal       0-3.0         

 

             Nucleated Red Blood Cell % 0.0 %        Normal       0-0           

 

             Neutrophils # 7.1 10       Normal       1.5-8.5       

 

             Lymph #      2.3 10       Normal       1.5-5.0       

 

             Mono #       0.9 10       High         0.0-0.8       

 

             Eos #        0.1 10       Normal       0.0-0.5       

 

             Baso #       0.1 10       Normal       0.0-0.2       

 

                    CBC With Differential 2021          [address] [address] [phone] White Blood Count 15.0 10    High       4.0-10.0    

 

             Red Blood Count 4.63 10      Normal       4.00-5.40     

 

             Hemoglobin   12.9 g/dL    Normal       12.0-15.5     

 

             Hematocrit   39.2 %       Normal       36.0-47.0     

 

             Mean Corpuscular Volume 84.7 fl      Normal       80.0-96.0     

 

             Mean Corpuscular Hemoglobin 27.9 pg      Normal       27.0-33.0    

 

 

             Mean Corpuscular HGB Conc 32.9 g/dL    Normal       32.0-36.5     

 

             Red Cell Distribution Width 19.1 %       High         11.5-14.5    

 

 

             Platelet Count, Automated 319 10       Normal       150-450       

 

             Neutrophils % 92.5 %       High         36.0-66.0     

 

             Lymph %      5.4 %        Low          24.0-44.0     

 

             Mono %       0.7 %        Low          2.0-8.0       

 

             Eos %        0.0 %        Normal       0.0-3.0       

 

             Baso %       0.1 %        Normal       0.0-1.0       

 

             Immature Granulocyte % 1.3 %        Normal       0-3.0         

 

             Nucleated Red Blood Cell % 0.0 %        Normal       0-0           

 

             Neutrophils # 13.9 10      High         1.5-8.5       

 

             Lymph #      0.8 10       Low          1.5-5.0       

 

             Mono #       0.1 10       Normal       0.0-0.8       

 

             Eos #        0.0 10       Normal       0.0-0.5       

 

             Baso #       0.0 10       Normal       0.0-0.2       

 

                    Comprehensive Metabolic Profil 2021          [address] [address] [phone] Glucose, Fasting 121 mg/dL  High             

 

             Blood Urea Nitrogen 27 mg/dL     High         7-18          

 

             Creatinine For GFR 1.00 mg/dL   Normal       0.55-1.30     

 

             Glomerular Filtration Rate 58.9         Normal       >45          4

 

             Sodium Level 137 mEq/L    Normal       136-145       

 

             Potassium Serum 4.2 mEq/L    Normal       3.5-5.1       

 

             Chloride Level 106 mEq/L    Normal               

 

             Carbon Dioxide Level 25 mEq/L     Normal       21-32         

 

             Anion Gap    6 mEq/L      Low          8-16          

 

             Calcium Level 8.9 mg/dL    Normal       8.8-10.2      

 

             Ast/Sgot     26 U/L       Normal       7-37          

 

             Alt/SGPT     40 U/L       Normal       12-78         

 

             Alkaline Phosphatase 79 U/L       Normal               

 

             Bilirubin,Total 0.5 mg/dL    Normal       0.2-1.0       

 

             Total Protein 7.5 GM/DL    Normal       6.4-8.2       

 

             Albumin      3.4 GM/DL    Normal       3.2-5.2       

 

             Albumin/Globulin Ratio 0.8          Low          1.2-2.2       

 

                    Total Iron Binding Capacit 2021          [address]

[place], [place] [id number] [phone] Iron (Fe)  139 g/dL Normal           

 

             Total Iron Binding Capacity 494 g/dL   High         250-450      

 

 

             Percent Saturation 28.1 %       Normal       13.2-45.0     

 

                    Laboratory test finding 2021          [facility]

                                        [address] [place] [address] [phone] Vitamin B12 Level 580 pg/mL  Normal     247-911    5

 

             Ferritin     19 NG/ML     Normal       8-252         

 

                    BCR/Abl Screen For CML Path 2021          [facility]

                                        [address] [place] [address] [phone]  BCR/Abl Screen For CML Path So See Pathology Re 

<SEE NOTE>  

Normal                                              6

 

                    Laboratory test finding 2021          [facility]

                                        [address] [place] [address] [phone]  Jak2 Mutations For Path Sendou See Pathology Re 

<SEE NOTE>  

Normal                                              7

 

                    A1c                 2021          [place] [facility]

, 

: Dr Dhaval Miranda

[place] [id number] [phone] Hba1c      6.0 %      High       <5.7       8

 

             Est Avg Glucose 125 mg/dL    High         60 - 110      

 

                    Laboratory test finding 2021          [place] [facility]

ists, 

: Dr Dhaval Miranda

[place], [place] [address] [phone] Magnesium  1.9 mg/dL             1.8 - 2.4   

 

                    Lipid Profile       2021          [facility]

, 

: Dr Dhaval Miranda

[place] [id number] [phone] Cholesterol 143 mg/dL             131 - 200   

 

             Triglycerides 105 mg/dL                 30 - 150      

 

             HDL Cholesterol 89 mg/dL     High         35 - 60       

 

             LDL (Calculated) 33 CALC      Low          50 - 159      

 

                    Comprehensive Chem Profile 2021          [facility] [facility]

ernists, 

: Dr Dhaval Miranda

[place], [place] [id number] [phone] Glucose    94 mg/dL              74 - 99    9

 

             BUN          19 mg/dL     High         7 - 18        

 

             Creatinine   1.0 mg/dL                 0.6 - 1.3     

 

             Sodium       142 mEq/L                 136 - 145     

 

             Potassium    4.1 mEq/L                 3.5 - 5.1     

 

             Chloride     104 mEq/L                 98 - 107      

 

             Carbon Dioxide 30 mEq/L                  21 - 32       

 

             Calcium      9.0 mg/dL                 8.5 - 10.1    

 

             Alk. Phosphatase 112 mg/dL                 46 - 116      

 

             Total Bilirubin 0.3 mg/dL                 0.2 - 1.0     

 

             Ast (Sgot)   17 U/L                    15 - 37       

 

             Alt (SGPT)   23 U/L                    12 - 78       

 

             Albumin      3.0 g/dL     Low          3.4 - 5.0     

 

             Total Protein 7.4 g/dL                  6.4 - 8.2     

 

             A/G Ratio    0.68 CALC    Low          1.00 - 1.90   

 

             GFR  55 mL/min    Low          >60           

 

             GFR African American >= 60 mL/min              >60          10

 

                    Complete Blood Count 2021          [facility]

s, pc

: Dr Dhaval Miranda

[place], [place] [address] [phone] WBC        9.2 x10*3/UL            4.1 - 10.9 11

 

             RBC          4.37 x10*6/UL              4.20 - 6.30   

 

             Hemoglobin   12.3 g/dL                 12.0 - 18.0   

 

             Hematocrit   36.6 %       Low          37.0 - 51.0   

 

             MCV          83.6 fL                   80.0 - 97.0   

 

             MCH          28.2 pg                   26.0 - 32.0   

 

             MCHC         33.7 g/dL                 31.0 - 38.0   

 

             RDW          15.5 %       High         11.6 - 13.7   

 

             PLT          506 x10*3/UL High         140 - 440     

 

             MPV          7.1 FL       Low          7.8 - 11.0    

 

             Lymph %      19.4 %                    10.0 - 58.5   

 

             Mid %        5.4 %                     1.7 - 9.3     

 

             Neut %       75.2 %                    37.0 - 92.0   

 

             Lymph #      1.7 x10*3/UL              0.6 - 4.1     

 

             Mid #        0.6 x10*3/UL              0.1 - 0.6     

 

             Neut #       6.9 x10*3/UL              2.0 - 7.8     

 

                    Laboratory test finding 2021          [facility]

                                        [address] [place] [address] [phone] Blood Urea Nitrogen 16 mg/dL   Normal     7-18        

 

                    Creatinine With GFR 2021          [facility]

nter

                                        [address] [place] [address] [phone] Creatinine For GFR 0.80 mg/dL Normal     0.55-1.30   

 

             Glomerular Filtration Rate > 60.0       Normal       >45          1

2

 

                    CBC With Differential 2021          [address] [address] [phone] White Blood Count 10.1 10    High       4.0-10.0    

 

             Red Blood Count 4.32 10      Normal       4.00-5.40     

 

             Hemoglobin   11.7 g/dL    Low          12.0-15.5     

 

             Hematocrit   36.6 %       Normal       36.0-47.0     

 

             Mean Corpuscular Volume 84.7 fl      Normal       80.0-96.0     

 

             Mean Corpuscular Hemoglobin 27.1 pg      Normal       27.0-33.0    

 

 

             Mean Corpuscular HGB Conc 32.0 g/dL    Normal       32.0-36.5     

 

             Red Cell Distribution Width 17.5 %       High         11.5-14.5    

 

 

             Platelet Count, Automated 510 10       High         150-450       

 

             Neutrophils % 70.4 %       High         36.0-66.0     

 

             Lymph %      20.5 %       Low          24.0-44.0     

 

             Mono %       7.0 %        Normal       2.0-8.0       

 

             Eos %        1.1 %        Normal       0.0-3.0       

 

             Baso %       0.6 %        Normal       0.0-1.0       

 

             Immature Granulocyte % 0.4 %        Normal       0-3.0         

 

             Nucleated Red Blood Cell % 0.0 %        Normal       0-0           

 

             Neutrophils # 7.1 10       Normal       1.5-8.5       

 

             Lymph #      2.1 10       Normal       1.5-5.0       

 

             Mono #       0.7 10       Normal       0.0-0.8       

 

             Eos #        0.1 10       Normal       0.0-0.5       

 

             Baso #       0.1 10       Normal       0.0-0.2       

 

                    Laboratory test finding 2021          [facility]

                                        [address] [place] [address] [phone] Erythrocyte Sedimentation Rate 44 mm/hr   High       0

-30        

 

                    Total Iron Binding Capacit 2021          [address] [address] [phone] Iron (Fe)  37 g/dL  Low              

 

             Total Iron Binding Capacity 383 g/dL   Normal       250-450      

 

 

             Percent Saturation 9.7 %        Low          13.2-45.0     

 

                    Vitamin B12 & Folate 2021          [facility]

                                        [address] [place] [address] [phone] Vitamin B12 Level 764 pg/mL  Normal                13

 

             Folate       20.3 NG/ML   Normal                    14

 

                    Laboratory test finding 2021          [facility]

                                        [address] [place] [address] [phone] C Reactive Protein Quantitativ 0.66 mg/dL High       0

.00-0.30   

 

                    Quanteferon TB Gold Test 2021          [facility]

                                        [address] [place] [address] [phone] QuantiFERON Criteria (SEE NOTE)  Normal     .         

 15

 

             QuantiFERON TB1 Ag Value 0.05 IU/mL   Normal       .             

 

             QuantiFERON TB2 Ag Value 0.05 IU/mL   Normal       .             

 

             QuantiFERON Nil Value 0.06 IU/mL   Normal       .             

 

             QuantiFERON Mitogen Value >10.00 IU/mL Normal       .             

 

             QuantiFERON-TB Gold Plus Negative     Normal       Negative     16

 

                    Laboratory test finding 2021          [facility]

                                        [address] [place] [address] [phone] Hepatitis B Surf AB Quant 4.9 mIU/mL Low        Immuni

ty>9.9 17

 

             Hepatitis B Core Antibody Igg Negative     Normal       Negative   

  18

 

                    Prometh Monitr Crohns's Disease 2021          [facility]

                                        [address] [place] [address] [phone] Monitr Crohn's Disease SEE SEPARATE REP <SEE NOTE>  No

rmal                19

 

                    IBD Reflex Crohns Prognostic 2021          [address] [place] [address] [phone] Ibdser1    SEE SEPARATE REP <SEE NOTE>  Normal        

        20

 

             Crohn1       SEE SEPARATE REP <SEE NOTE>  Normal                   

 21







                          1                         Units are mL/min/1.73 m2



Chronic Kidney Disease Staging per NKF:



Stage I & II   GFR >=60       Normal to Mildly Decreased

Stage III      GFR 30-59      Moderately Decreased

Stage IV       GFR 15-29      Severely Decreased

Stage V        GFR <15        Very Little GFR Left

ESRD           GFR <15 on RRT



 

                          2                         VITAMIN B12 NORMAL RANGE



NORMAL                     247 - 911 PG/ML

INDETERMINATE              211 - 246 PG/ML

DEFICIENT              LESS THAN 211 PG/ML



 

                          3                         FOLATE NORMAL RANGE



NORMAL             GREATER THAN 5.4 NG/ML

INDETERMINATE               3.4-5.4 NG/ML

DEFICIENT             LESS THAN 3.4 NG/ML



 

                          4                         Units are mL/min/1.73 m2



Chronic Kidney Disease Staging per NKF:



Stage I & II   GFR >=60       Normal to Mildly Decreased

Stage III      GFR 30-59      Moderately Decreased

Stage IV       GFR 15-29      Severely Decreased

Stage V        GFR <15        Very Little GFR Left

ESRD           GFR <15 on RRT



 

                          5                         VITAMIN B12 NORMAL RANGE



NORMAL                     247 - 911 PG/ML

INDETERMINATE              211 - 246 PG/ML

DEFICIENT              LESS THAN 211 PG/ML



 

                          6                         See Pathology Report

Specimen Collection for Pathology Reference Lab Testing.

Refer to [facility] Pathology Report for Results:[id number]



 

                          7                         See Pathology Report

Specimen Collection for Pathology Reference Lab Testing.

Refer to [facility] Pathology Report for Results: [id number]



 

                          8                         Lab Result Notes:

Pre-Diabetes   5.7 - 6.4 %

Diabetes           = or > 6.5%



 

                          9                         100-125 mg/dL     PRE-DIABET

ES/FASTING

>126 mg/dL          DIABETES/FASTING



 

                          10                        CHRONIC KIDNEY DISEASE STAGI

NG PER NKF



STAGE I & II      GFR >= 60        NORMAL TO MILDLY DECREASED

STAGE III          GFR 30-59          MODERATELY DECREASED

STAGE IV           GFR 15-29         SEVERELY DECREASED

STAGE V            GFR <15            VERY LITTLE GFR LEFT

ESRD                 GFR <15            ON RRT



 

                          11                        NOTE:

CBC VERIFIED







 

                          12                        Units are mL/min/1.73 m2



Chronic Kidney Disease Staging per NKF:



Stage I & II   GFR >=60       Normal to Mildly Decreased

Stage III      GFR 30-59      Moderately Decreased

Stage IV       GFR 15-29      Severely Decreased

Stage V        GFR <15        Very Little GFR Left

ESRD           GFR <15 on RRT



 

                          13                        VITAMIN B12 NORMAL RANGE



NORMAL                     247 - 911 PG/ML

INDETERMINATE              211 - 246 PG/ML

DEFICIENT              LESS THAN 211 PG/ML



 

                          14                        FOLATE NORMAL RANGE



NORMAL             GREATER THAN 5.4 NG/ML

INDETERMINATE               3.4-5.4 NG/ML

DEFICIENT             LESS THAN 3.4 NG/ML



 

                          15                        .

The QuantiFERON-TB Gold Plus result is determined by

subtracting the Nil value from either TB antigen (Ag) tube.

The mitogen tube serves as a control for the test.



 

                          16                        The specimen received for Qu

antiFERON testing was incubated

by the ordering institution. Specific procedures outlined

in our Directory of Services and in the package insert for

the QuantiFERON Gold (In Tube) test must be followed to

enable for proper stimulation of cells for the production

of interferon gamma. Chemiluminescence immunoassay

methodology

Performed at:  [facility]  [id number]

: Araceli B Reyes MD, Phone:  [phone]



 

                          17                        Status of Immunity          

           Anti-HBs Level

                                        ------------------                     -

-------------

Inconsistent with Immunity                   0.0 - 9.9

Consistent with Immunity                          >9.9



 

                          18                        Performed at:  [facility] 

[address]  [id number]

: Araceli B Reyes MD, Phone:  [phone]



 

                          19                        SEE SEPARATE REPORT



 

                          20                        SEE SEPARATE REPORT



 

                          21                        SEE SEPARATE REPORT

Testing performed at reference lab . Report copy to follow

on a separate form. 21 REF LAB#:[id number]









Procedures





                Date            Code            Description     Status

 

                2021      29471           Chronic Care Management Services

 Ea Addl 20 Min Completed

 

                    2021          01953               Chronic Care MGMT 20

 Mins Clinical Staff Time Per Calendar 

Month                                   Completed

 

                2021      95775           Office/Outpatient Established Mo

d MDM 30-39 Min Completed

 

                    2021          32785               Chronic Care MGMT 20

 Mins Clinical Staff Time Per Calendar 

Month                                   Completed

 

                2021      84604           Office/Outpatient Established Lo

w MDM 20-29 Min Completed

 

                2021      [id number]       Diabetic Retinal Eye Exam [facility]

 

                2020      [id number]       Bone Mineral Density Test [facility]

 

                2020      [id number]        Mammogram       Completed

 

                2019      [id number]       Diabetic Retinal Eye Exam [facility]

 

                2018      [id number]       Bone Mineral Density Test [facility]

 

                2018      [id number]        Mammogram       Completed

 

                2016      [id number]        Mammogram       Completed

 

                2016      [id number]        Mammogram       Completed

 

                2014      [id number]        Mammogram       Completed

 

                2011      [id number]        Mammogram       Completed

 

                2011      [id number]       Bone Mineral Density Test [facility]

 

                2010      [id number]       Diabetic Foot Exam Completed

 

                2010      [id number]        Mammogram       Completed

 

                10/22/2008      [id number]       Bone Mineral Density Test [facility]

 

                2006      [id number]        Mammogram       Completed







Medical Devices





                                        Description

 

                                        No Information Available







Encounters





           Type       Date       Location   Provider   Dx         Diagnosis

 

             Office Visit 2021  9:00a [facility] Olya Hutchins, [facility] 

E11.21                                  Type 2 diabetes mellitus with diabetic n

ephropathy

 

                          N18.31                    Chronic kidney disease, stag

e 3a

 

                          D69.6                     Thrombocytopenia, unspecifie

d

 

                          K21.9                     Gastro-esophageal reflux dis

ease without esophagitis

 

                          E78.00                    Pure hypercholesterolemia, u

nspecified

 

                          E55.9                     Vitamin D deficiency, unspec

ified

 

                          F41.9                     Anxiety disorder, unspecifie

d

 

                          D50.9                     Iron deficiency anemia, unsp

ecified

 

                          K50.918                   Crohn's disease, unspecified

, with other complication

 

                          N95.2                     Postmenopausal atrophic vagi

nitis

 

                          E78.2                     Mixed hyperlipidemia

 

                          Z93.2                     Ileostomy status

 

             Office Visit 2021  2:20p [facility] Olya Hutchins, [facility] 

E11.21                                  Type 2 diabetes mellitus with diabetic n

ephropathy

 

                          N18.31                    Chronic kidney disease, stag

e 3a

 

                          Z93.2                     Ileostomy status

 

                          K21.9                     Gastro-esophageal reflux dis

ease without esophagitis

 

                          E78.00                    Pure hypercholesterolemia, u

nspecified

 

                          E55.9                     Vitamin D deficiency, unspec

ified

 

                          F41.9                     Anxiety disorder, unspecifie

d

 

                          D50.9                     Iron deficiency anemia, unsp

ecified

 

                          K50.918                   Crohn's disease, unspecified

, with other complication

 

                          H93.13                    Tinnitus, bilateral

 

                          H61.23                    Impacted cerumen, bilateral







Assessments





                Date            Code            Description     Provider

 

                2021      N18.31          Chronic kidney disease, stage 3a

 Dhaval Miranda MD

 

                2021      K21.9           Gastro-esophageal reflux disease

 without esophagitis Dhaval Miranda MD

 

                2021      E78.00          Pure hypercholesterolemia, unspe

cified Dhaval Miranda MD

 

                2021      E11.21          Type 2 diabetes mellitus with di

abetic nephropathy Olya Bates [facility]

 

                2021      N18.31          Chronic kidney disease, stage 3a

 Olya Bates [facility]

 

                2021      D69.6           Thrombocytopenia, unspecified An

david Bates [facility]

 

                2021      K21.9           Gastro-esophageal reflux disease

 without esophagitis Olya Bates [facility]

 

                2021      E78.00          Pure hypercholesterolemia, unspe

cified Olya Le [place], [facility]

 

                2021      E55.9           Vitamin D deficiency, unspecifie

d Olya Le [place], [facility]

 

                2021      F41.9           Anxiety disorder, unspecified An

n Le [place], [facility]

 

                2021      D50.9           Iron deficiency anemia, unspecif

ied Olya Krystal [place], [facility]

 

                2021      K50.918         Crohn's disease, unspecified, wi

th other complication Olya Le 

[place], [facility]

 

                2021      N95.2           Postmenopausal atrophic vaginiti

s Olya [place], [facility]

 

                2021      E78.2           Mixed hyperlipidemia Olya Le [place]

, [facility]

 

                2021      Z93.2           Ileostomy status Olya Bates, 

P

 

                2021      E11.21          Type 2 diabetes mellitus with di

abetic nephropathy Dhaval Miranda MD

 

                2021      N18.31          Chronic kidney disease, stage 3a

 Dhaval Miranda MD

 

                2021      K21.9           Gastro-esophageal reflux disease

 without esophagitis Dhaval Miranda MD

 

                2021      E78.00          Pure hypercholesterolemia, unspe

cified Dhaval Miranda MD

 

                2021      E11.21          Type 2 diabetes mellitus with di

abetic nephropathy Olya Bates, [facility]

 

                2021      N18.31          Chronic kidney disease, stage 3a

 Olya Krystal [place], [facility]

 

                2021      Z93.2           Ileostomy status Olya Krystal [place], 

P

 

                2021      K21.9           Gastro-esophageal reflux disease

 without esophagitis Olya Le 

[place], [facility]

 

                2021      E78.00          Pure hypercholesterolemia, unspe

cified Olya Le [place], [facility]

 

                2021      E55.9           Vitamin D deficiency, unspecifie

d Olya Le [place], [facility]

 

                2021      F41.9           Anxiety disorder, unspecified An

n [place], [facility]

 

                2021      D50.9           Iron deficiency anemia, unspecif

ied Olya [place], [facility]

 

                2021      K50.918         Crohn's disease, unspecified, wi

th other complication Olya Le 

[place], [facility]

 

                2021      H93.13          Tinnitus, bilateral Olya Bates,

 [facility]

 

                2021      H61.23          Impacted cerumen, bilateral Olya Bates, [facility]







Plan of Treatment

Future Appointment(s):* 2022  9:00 am - Nurse #2 at [facility]

* 2022  9:20 am - [facility] at [facility]

* 2021 10:20 am - [facility] Douglas at [facility]

2021 - [facility] Douglas* E11.21 Type 2 diabetes mellitus with diabetic 
  nephropathy* Comments:* A1C obtained and reviewed and is at goal.





* N18.31 Chronic kidney disease, stage 3a* Comments:* BMP obtained, reviewed and
  is stable.





* D69.6 Thrombocytopenia, unspecified* Comments:* platelets remain elevated.  
  Will refer to hematology.





* K21.9 Gastro-esophageal reflux disease without esophagitis* Comments:* 
  asymptomatic on PPI





* E78.00 Pure hypercholesterolemia, unspecified

* E55.9 Vitamin D deficiency, unspecified* Comments:* continue supplementation.





* F41.9 Anxiety disorder, unspecified* Comments:* doing well on Bupropion   and 
  Buspirone.





* D50.9 Iron deficiency anemia, unspecified* Comments:* CBC obtained and 
  reviewed and has improved.





* K50.918 Crohn's disease, unspecified, with other complication* Comments:* she 
  is now on Budesonide.





* N95.2 Postmenopausal atrophic vaginitis

* E78.2 Mixed hyperlipidemia* Comments:* Lipids obtained and reviewed and are at
  goal.tolerating statin without adverse effects.



* Recommendations:* low cholesterol diet.





* Z93.2 Ileostomy status

* All * New Medication:* Estrace 0.1 mg/GM - 1/4 applicatorful vaginally at 
  bedtime daily x 14 days then three times a week









Functional Status





                                        Description

 

                                        No Information Available







Mental Status





                                        Description

 

                                        No Information Available







Referrals





                Refer to      Reason for Referral Status          Appt Date

 

                [facility] Hematology/Oncology NP CONSULT FOR ELEVATED PLATELETS  T[place]

nt Notified 

2021

 

                                        [address] [place], [place]  [id number] [phone]

## 2022-05-05 ENCOUNTER — HOSPITAL ENCOUNTER (OUTPATIENT)
Dept: HOSPITAL 53 - M LAB REF | Age: 67
End: 2022-05-05
Attending: NURSE PRACTITIONER
Payer: MEDICARE

## 2022-05-05 DIAGNOSIS — N18.31: ICD-10-CM

## 2022-05-05 DIAGNOSIS — K50.918: Primary | ICD-10-CM

## 2022-06-21 ENCOUNTER — HOSPITAL ENCOUNTER (OUTPATIENT)
Dept: HOSPITAL 53 - M WHC | Age: 67
End: 2022-06-21
Attending: NURSE PRACTITIONER
Payer: MEDICARE

## 2022-06-21 DIAGNOSIS — M81.0: Primary | ICD-10-CM

## 2022-09-27 ENCOUNTER — HOSPITAL ENCOUNTER (OUTPATIENT)
Dept: HOSPITAL 53 - M INFU | Age: 67
Discharge: HOME | End: 2022-09-27
Attending: INTERNAL MEDICINE
Payer: MEDICARE

## 2022-09-27 VITALS — SYSTOLIC BLOOD PRESSURE: 137 MMHG | DIASTOLIC BLOOD PRESSURE: 63 MMHG

## 2022-09-27 VITALS — DIASTOLIC BLOOD PRESSURE: 60 MMHG | SYSTOLIC BLOOD PRESSURE: 128 MMHG

## 2022-09-27 VITALS — BODY MASS INDEX: 24.15 KG/M2 | WEIGHT: 123.02 LBS | HEIGHT: 60 IN

## 2022-09-27 DIAGNOSIS — K50.90: Primary | ICD-10-CM

## 2022-09-27 DIAGNOSIS — Z88.1: ICD-10-CM

## 2022-09-27 PROCEDURE — 96365 THER/PROPH/DIAG IV INF INIT: CPT

## 2022-10-26 ENCOUNTER — HOSPITAL ENCOUNTER (OUTPATIENT)
Dept: HOSPITAL 53 - M WHC | Age: 67
End: 2022-10-26
Attending: NURSE PRACTITIONER
Payer: MEDICARE

## 2022-10-26 DIAGNOSIS — Z12.31: Primary | ICD-10-CM

## 2022-11-18 ENCOUNTER — HOSPITAL ENCOUNTER (OUTPATIENT)
Dept: HOSPITAL 53 - M PLALAB | Age: 67
End: 2022-11-18
Attending: INTERNAL MEDICINE
Payer: MEDICARE

## 2022-11-18 ENCOUNTER — HOSPITAL ENCOUNTER (OUTPATIENT)
Dept: HOSPITAL 53 - M PLALAB | Age: 67
End: 2022-11-18
Attending: NURSE PRACTITIONER
Payer: MEDICARE

## 2022-11-18 DIAGNOSIS — K50.812: Primary | ICD-10-CM

## 2022-11-18 DIAGNOSIS — E11.21: Primary | ICD-10-CM

## 2022-11-18 LAB
BASOPHILS # BLD AUTO: 0.1 10^3/UL (ref 0–0.2)
BASOPHILS NFR BLD AUTO: 0.6 % (ref 0–1)
BUN SERPL-MCNC: 18 MG/DL (ref 9–23)
CALCIUM SERPL-MCNC: 9.9 MG/DL (ref 8.3–10.6)
CHLORIDE SERPL-SCNC: 107 MMOL/L (ref 98–107)
CO2 SERPL-SCNC: 28 MMOL/L (ref 20–31)
CREAT SERPL-MCNC: 0.9 MG/DL (ref 0.55–1.3)
EOSINOPHIL # BLD AUTO: 0.2 10^3/UL (ref 0–0.5)
EOSINOPHIL NFR BLD AUTO: 1.8 % (ref 0–3)
EST. AVERAGE GLUCOSE BLD GHB EST-MCNC: 131 MG/DL (ref 60–110)
GFR SERPL CREATININE-BSD FRML MDRD: > 60 ML/MIN/{1.73_M2} (ref 45–?)
GLUCOSE SERPL-MCNC: 101 MG/DL (ref 74–106)
HCT VFR BLD AUTO: 39.4 % (ref 36–47)
HGB BLD-MCNC: 12.5 G/DL (ref 12–15.5)
LYMPHOCYTES # BLD AUTO: 1.5 10^3/UL (ref 1.5–5)
LYMPHOCYTES NFR BLD AUTO: 18.7 % (ref 24–44)
MCH RBC QN AUTO: 30 PG (ref 27–33)
MCHC RBC AUTO-ENTMCNC: 31.7 G/DL (ref 32–36.5)
MCV RBC AUTO: 94.7 FL (ref 80–96)
MONOCYTES # BLD AUTO: 0.9 10^3/UL (ref 0–0.8)
MONOCYTES NFR BLD AUTO: 10.6 % (ref 2–8)
NEUTROPHILS # BLD AUTO: 5.6 10^3/UL (ref 1.5–8.5)
NEUTROPHILS NFR BLD AUTO: 67.9 % (ref 36–66)
PLATELET # BLD AUTO: 389 10^3/UL (ref 150–450)
POTASSIUM SERPL-SCNC: 4.4 MMOL/L (ref 3.5–5.1)
RBC # BLD AUTO: 4.16 10^6/UL (ref 4–5.4)
SODIUM SERPL-SCNC: 144 MMOL/L (ref 136–145)
WBC # BLD AUTO: 8.2 10^3/UL (ref 4–10)

## 2023-04-19 ENCOUNTER — HOSPITAL ENCOUNTER (OUTPATIENT)
Dept: HOSPITAL 53 - M WUC | Age: 68
End: 2023-04-19
Attending: NURSE PRACTITIONER
Payer: MEDICARE

## 2023-04-19 DIAGNOSIS — M54.6: ICD-10-CM

## 2023-04-19 DIAGNOSIS — M25.552: Primary | ICD-10-CM

## 2023-10-30 ENCOUNTER — HOSPITAL ENCOUNTER (OUTPATIENT)
Dept: HOSPITAL 53 - M WHC | Age: 68
End: 2023-10-30
Payer: MEDICARE

## 2023-10-30 DIAGNOSIS — Z12.31: Primary | ICD-10-CM

## 2024-07-24 ENCOUNTER — HOSPITAL ENCOUNTER (EMERGENCY)
Dept: HOSPITAL 53 - M ED | Age: 69
Discharge: HOME | End: 2024-07-24
Payer: MEDICARE

## 2024-07-24 VITALS — TEMPERATURE: 97.7 F | SYSTOLIC BLOOD PRESSURE: 118 MMHG | DIASTOLIC BLOOD PRESSURE: 57 MMHG | OXYGEN SATURATION: 98 %

## 2024-07-24 VITALS — HEIGHT: 61 IN | WEIGHT: 127.87 LBS | BODY MASS INDEX: 24.14 KG/M2

## 2024-07-24 DIAGNOSIS — F32.A: ICD-10-CM

## 2024-07-24 DIAGNOSIS — E11.9: ICD-10-CM

## 2024-07-24 DIAGNOSIS — Z88.8: ICD-10-CM

## 2024-07-24 DIAGNOSIS — F41.9: ICD-10-CM

## 2024-07-24 DIAGNOSIS — R07.9: Primary | ICD-10-CM

## 2024-07-24 DIAGNOSIS — Z79.899: ICD-10-CM

## 2024-07-24 DIAGNOSIS — Z79.82: ICD-10-CM

## 2024-07-24 DIAGNOSIS — K21.9: ICD-10-CM

## 2024-07-24 DIAGNOSIS — I10: ICD-10-CM

## 2024-07-24 DIAGNOSIS — R00.2: ICD-10-CM

## 2024-07-24 LAB
ALBUMIN SERPL BCG-MCNC: 3.4 G/DL (ref 3.2–5.2)
ALP SERPL-CCNC: 109 U/L (ref 46–116)
ALT SERPL W P-5'-P-CCNC: 30 U/L (ref 7–40)
AST SERPL-CCNC: 21 U/L (ref ?–34)
BASOPHILS # BLD AUTO: 0.1 10^3/UL (ref 0–0.2)
BASOPHILS NFR BLD AUTO: 0.7 % (ref 0–1)
BILIRUB CONJ SERPL-MCNC: 0.1 MG/DL (ref ?–0.4)
BILIRUB SERPL-MCNC: 0.4 MG/DL (ref 0.3–1.2)
BUN SERPL-MCNC: 19 MG/DL (ref 9–23)
CALCIUM SERPL-MCNC: 9.6 MG/DL (ref 8.3–10.6)
CHLORIDE SERPL-SCNC: 110 MMOL/L (ref 98–107)
CK MB CFR.DF SERPL CALC: 0.99
CK MB CFR.DF SERPL CALC: 0.99
CK MB SERPL-MCNC: < 1 NG/ML (ref ?–3.6)
CK MB SERPL-MCNC: < 1 NG/ML (ref ?–3.6)
CK SERPL-CCNC: 101 U/L (ref 34–145)
CK SERPL-CCNC: 101 U/L (ref 34–145)
CO2 SERPL-SCNC: 26 MMOL/L (ref 20–31)
CREAT SERPL-MCNC: 0.93 MG/DL (ref 0.55–1.3)
EOSINOPHIL # BLD AUTO: 0.1 10^3/UL (ref 0–0.5)
EOSINOPHIL NFR BLD AUTO: 0.9 % (ref 0–3)
GFR SERPL CREATININE-BSD FRML MDRD: > 60 ML/MIN/{1.73_M2} (ref 39–?)
GLUCOSE SERPL-MCNC: 101 MG/DL (ref 74–106)
HCT VFR BLD AUTO: 37.8 % (ref 36–47)
HGB BLD-MCNC: 12.6 G/DL (ref 12–15.5)
INR PPP: 1.01
LIPASE SERPL-CCNC: 38 U/L (ref 12–53)
LYMPHOCYTES # BLD AUTO: 1.9 10^3/UL (ref 1.5–5)
LYMPHOCYTES NFR BLD AUTO: 23 % (ref 24–44)
MCH RBC QN AUTO: 30.3 PG (ref 27–33)
MCHC RBC AUTO-ENTMCNC: 33.3 G/DL (ref 32–36.5)
MCV RBC AUTO: 90.9 FL (ref 80–96)
MONOCYTES # BLD AUTO: 0.6 10^3/UL (ref 0–0.8)
MONOCYTES NFR BLD AUTO: 7.8 % (ref 2–8)
NEUTROPHILS # BLD AUTO: 5.4 10^3/UL (ref 1.5–8.5)
NEUTROPHILS NFR BLD AUTO: 67.1 % (ref 36–66)
PLATELET # BLD AUTO: 331 10^3/UL (ref 150–450)
POTASSIUM SERPL-SCNC: 4.3 MMOL/L (ref 3.5–5.1)
PROT SERPL-MCNC: 7 G/DL (ref 5.7–8.2)
PROTHROMBIN TIME: 13 SECONDS (ref 12.5–14.5)
RBC # BLD AUTO: 4.16 10^6/UL (ref 4–5.4)
SODIUM SERPL-SCNC: 140 MMOL/L (ref 136–145)
WBC # BLD AUTO: 8.1 10^3/UL (ref 4–10)

## 2024-07-24 PROCEDURE — 84484 ASSAY OF TROPONIN QUANT: CPT

## 2024-07-24 PROCEDURE — 71045 X-RAY EXAM CHEST 1 VIEW: CPT

## 2024-07-24 PROCEDURE — 82553 CREATINE MB FRACTION: CPT

## 2024-07-24 PROCEDURE — 85610 PROTHROMBIN TIME: CPT

## 2024-07-24 PROCEDURE — 71275 CT ANGIOGRAPHY CHEST: CPT

## 2024-07-24 PROCEDURE — 82550 ASSAY OF CK (CPK): CPT

## 2024-07-24 PROCEDURE — 93041 RHYTHM ECG TRACING: CPT

## 2024-07-24 PROCEDURE — 94760 N-INVAS EAR/PLS OXIMETRY 1: CPT

## 2024-07-24 PROCEDURE — 80053 COMPREHEN METABOLIC PANEL: CPT

## 2024-07-24 PROCEDURE — 85025 COMPLETE CBC W/AUTO DIFF WBC: CPT

## 2024-07-24 PROCEDURE — 83690 ASSAY OF LIPASE: CPT

## 2024-07-24 PROCEDURE — 82248 BILIRUBIN DIRECT: CPT

## 2024-07-24 PROCEDURE — 99285 EMERGENCY DEPT VISIT HI MDM: CPT

## 2024-07-24 PROCEDURE — 93005 ELECTROCARDIOGRAM TRACING: CPT

## 2024-07-24 PROCEDURE — 74177 CT ABD & PELVIS W/CONTRAST: CPT

## 2024-07-24 PROCEDURE — 36415 COLL VENOUS BLD VENIPUNCTURE: CPT

## 2024-09-08 ENCOUNTER — HOSPITAL ENCOUNTER (INPATIENT)
Dept: HOSPITAL 53 - M ED | Age: 69
LOS: 1 days | Discharge: HOME | DRG: 389 | End: 2024-09-09
Attending: HOSPITALIST | Admitting: HOSPITALIST
Payer: MEDICARE

## 2024-09-08 VITALS — SYSTOLIC BLOOD PRESSURE: 114 MMHG | TEMPERATURE: 98 F | OXYGEN SATURATION: 98 % | DIASTOLIC BLOOD PRESSURE: 73 MMHG

## 2024-09-08 VITALS — TEMPERATURE: 98.3 F | SYSTOLIC BLOOD PRESSURE: 109 MMHG | OXYGEN SATURATION: 98 % | DIASTOLIC BLOOD PRESSURE: 53 MMHG

## 2024-09-08 VITALS — BODY MASS INDEX: 23.46 KG/M2 | WEIGHT: 119.49 LBS | HEIGHT: 60 IN

## 2024-09-08 DIAGNOSIS — K50.90: ICD-10-CM

## 2024-09-08 DIAGNOSIS — K52.9: ICD-10-CM

## 2024-09-08 DIAGNOSIS — E86.0: ICD-10-CM

## 2024-09-08 DIAGNOSIS — Z93.2: ICD-10-CM

## 2024-09-08 DIAGNOSIS — Z90.49: ICD-10-CM

## 2024-09-08 DIAGNOSIS — Z88.8: ICD-10-CM

## 2024-09-08 DIAGNOSIS — I10: ICD-10-CM

## 2024-09-08 DIAGNOSIS — Z86.711: ICD-10-CM

## 2024-09-08 DIAGNOSIS — N17.9: ICD-10-CM

## 2024-09-08 DIAGNOSIS — Z79.82: ICD-10-CM

## 2024-09-08 DIAGNOSIS — E11.9: ICD-10-CM

## 2024-09-08 DIAGNOSIS — Z79.899: ICD-10-CM

## 2024-09-08 DIAGNOSIS — K56.50: Primary | ICD-10-CM

## 2024-09-08 LAB
ALBUMIN SERPL BCG-MCNC: 4 G/DL (ref 3.2–5.2)
ALP SERPL-CCNC: 132 U/L (ref 46–116)
ALT SERPL W P-5'-P-CCNC: 40 U/L (ref 7–40)
AST SERPL-CCNC: 30 U/L (ref ?–34)
BASOPHILS # BLD AUTO: 0 10^3/UL (ref 0–0.2)
BASOPHILS NFR BLD AUTO: 0.2 % (ref 0–1)
BILIRUB CONJ SERPL-MCNC: 0.2 MG/DL (ref ?–0.4)
BILIRUB SERPL-MCNC: 0.5 MG/DL (ref 0.3–1.2)
BUN SERPL-MCNC: 26 MG/DL (ref 9–23)
CALCIUM SERPL-MCNC: 10.3 MG/DL (ref 8.3–10.6)
CHLORIDE SERPL-SCNC: 95 MMOL/L (ref 98–107)
CO2 SERPL-SCNC: 23 MMOL/L (ref 20–31)
CREAT SERPL-MCNC: 1.8 MG/DL (ref 0.55–1.3)
EOSINOPHIL # BLD AUTO: 0 10^3/UL (ref 0–0.5)
EOSINOPHIL NFR BLD AUTO: 0 % (ref 0–3)
GFR SERPL CREATININE-BSD FRML MDRD: 29.6 ML/MIN/{1.73_M2} (ref 39–?)
GLUCOSE SERPL-MCNC: 131 MG/DL (ref 74–106)
HCT VFR BLD AUTO: 42 % (ref 36–47)
HGB BLD-MCNC: 14.6 G/DL (ref 12–15.5)
LIPASE SERPL-CCNC: 34 U/L (ref 12–53)
LYMPHOCYTES # BLD AUTO: 0.8 10^3/UL (ref 1.5–5)
LYMPHOCYTES NFR BLD AUTO: 4.5 % (ref 24–44)
MAGNESIUM SERPL-MCNC: 2 MG/DL (ref 1.8–2.4)
MCH RBC QN AUTO: 31 PG (ref 27–33)
MCHC RBC AUTO-ENTMCNC: 34.8 G/DL (ref 32–36.5)
MCV RBC AUTO: 89.2 FL (ref 80–96)
MONOCYTES # BLD AUTO: 0.8 10^3/UL (ref 0–0.8)
MONOCYTES NFR BLD AUTO: 4.8 % (ref 2–8)
NEUTROPHILS # BLD AUTO: 15.6 10^3/UL (ref 1.5–8.5)
NEUTROPHILS NFR BLD AUTO: 90.1 % (ref 36–66)
PLATELET # BLD AUTO: 398 10^3/UL (ref 150–450)
POTASSIUM SERPL-SCNC: 4 MMOL/L (ref 3.5–5.1)
PROT SERPL-MCNC: 8.2 G/DL (ref 5.7–8.2)
RBC # BLD AUTO: 4.71 10^6/UL (ref 4–5.4)
SODIUM SERPL-SCNC: 130 MMOL/L (ref 136–145)
WBC # BLD AUTO: 17.4 10^3/UL (ref 4–10)

## 2024-09-08 RX ADMIN — SODIUM CHLORIDE SCH MLS/HR: 9 INJECTION, SOLUTION INTRAVENOUS at 19:51

## 2024-09-08 RX ADMIN — SODIUM CHLORIDE ONE MLS/HR: 9 INJECTION, SOLUTION INTRAVENOUS at 13:33

## 2024-09-08 RX ADMIN — SODIUM CHLORIDE ONE MLS/HR: 9 INJECTION, SOLUTION INTRAVENOUS at 17:02

## 2024-09-08 RX ADMIN — ONDANSETRON ONE MG: 2 INJECTION INTRAMUSCULAR; INTRAVENOUS at 13:33

## 2024-09-08 RX ADMIN — DEXTROSE MONOHYDRATE SCH MG: 50 INJECTION, SOLUTION INTRAVENOUS at 18:29

## 2024-09-08 RX ADMIN — BISACODYL ONE MG: 10 SUPPOSITORY RECTAL at 15:27

## 2024-09-08 RX ADMIN — MORPHINE SULFATE ONE MG: 4 INJECTION INTRAVENOUS at 13:34

## 2024-09-08 RX ADMIN — ACETAMINOPHEN ONE MLS/HR: 10 INJECTION, SOLUTION INTRAVENOUS at 16:03

## 2024-09-08 RX ADMIN — METRONIDAZOLE SCH MLS/HR: 500 INJECTION, SOLUTION INTRAVENOUS at 18:29

## 2024-09-08 RX ADMIN — CIPROFLOXACIN SCH MLS/HR: 2 INJECTION, SOLUTION INTRAVENOUS at 17:01

## 2024-09-09 VITALS — OXYGEN SATURATION: 97 % | DIASTOLIC BLOOD PRESSURE: 57 MMHG | TEMPERATURE: 97.2 F | SYSTOLIC BLOOD PRESSURE: 124 MMHG

## 2024-09-09 VITALS — DIASTOLIC BLOOD PRESSURE: 52 MMHG | SYSTOLIC BLOOD PRESSURE: 110 MMHG | TEMPERATURE: 97.6 F | OXYGEN SATURATION: 97 %

## 2024-09-09 VITALS — TEMPERATURE: 99 F | DIASTOLIC BLOOD PRESSURE: 57 MMHG | OXYGEN SATURATION: 98 % | SYSTOLIC BLOOD PRESSURE: 116 MMHG

## 2024-09-09 VITALS — DIASTOLIC BLOOD PRESSURE: 54 MMHG | TEMPERATURE: 97.6 F | SYSTOLIC BLOOD PRESSURE: 98 MMHG | OXYGEN SATURATION: 95 %

## 2024-09-09 LAB
ALBUMIN SERPL BCG-MCNC: 2.7 G/DL (ref 3.2–5.2)
ALP SERPL-CCNC: 93 U/L (ref 46–116)
ALT SERPL W P-5'-P-CCNC: 25 U/L (ref 7–40)
AST SERPL-CCNC: 21 U/L (ref ?–34)
BILIRUB SERPL-MCNC: 0.6 MG/DL (ref 0.3–1.2)
BUN SERPL-MCNC: 16 MG/DL (ref 9–23)
CALCIUM SERPL-MCNC: 8.1 MG/DL (ref 8.3–10.6)
CHLORIDE SERPL-SCNC: 108 MMOL/L (ref 98–107)
CO2 SERPL-SCNC: 25 MMOL/L (ref 20–31)
CREAT SERPL-MCNC: 1.15 MG/DL (ref 0.55–1.3)
GFR SERPL CREATININE-BSD FRML MDRD: 49.7 ML/MIN/{1.73_M2} (ref 39–?)
GLUCOSE SERPL-MCNC: 91 MG/DL (ref 74–106)
HCT VFR BLD AUTO: 32.4 % (ref 36–47)
HGB BLD-MCNC: 10.7 G/DL (ref 12–15.5)
MCH RBC QN AUTO: 30.3 PG (ref 27–33)
MCHC RBC AUTO-ENTMCNC: 33 G/DL (ref 32–36.5)
MCV RBC AUTO: 91.8 FL (ref 80–96)
PLATELET # BLD AUTO: 307 10^3/UL (ref 150–450)
POTASSIUM SERPL-SCNC: 3.9 MMOL/L (ref 3.5–5.1)
PROT SERPL-MCNC: 5.8 G/DL (ref 5.7–8.2)
RBC # BLD AUTO: 3.53 10^6/UL (ref 4–5.4)
SODIUM SERPL-SCNC: 139 MMOL/L (ref 136–145)
WBC # BLD AUTO: 9.4 10^3/UL (ref 4–10)

## 2024-09-09 RX ADMIN — ACETAMINOPHEN PRN MLS/HR: 10 INJECTION, SOLUTION INTRAVENOUS at 03:30

## 2025-05-15 ENCOUNTER — HOSPITAL ENCOUNTER (OUTPATIENT)
Dept: HOSPITAL 53 - M LAB | Age: 70
End: 2025-05-15
Attending: STUDENT IN AN ORGANIZED HEALTH CARE EDUCATION/TRAINING PROGRAM
Payer: MEDICARE

## 2025-05-15 DIAGNOSIS — K50.00: Primary | ICD-10-CM

## 2025-05-15 LAB
ALBUMIN SERPL BCG-MCNC: 3.3 G/DL (ref 3.2–5.2)
BASOPHILS # BLD AUTO: 0.1 10^3/UL (ref 0–0.2)
BASOPHILS NFR BLD AUTO: 0.6 % (ref 0–1)
BILIRUB SERPL-MCNC: 0.4 MG/DL (ref 0.3–1.2)
CALCIUM SERPL-MCNC: 9.2 MG/DL (ref 8.3–10.6)
CREAT SERPL-MCNC: 0.88 MG/DL (ref 0.55–1.3)
CRP SERPL-MCNC: 0.79 MG/DL (ref ?–1)
EOSINOPHIL # BLD AUTO: 0.1 10^3/UL (ref 0–0.5)
EOSINOPHIL NFR BLD AUTO: 1.4 % (ref 0–3)
ERYTHROCYTE [SEDIMENTATION RATE] IN BLOOD BY WESTERGREN METHOD: 49 MM/HR (ref 0–30)
GFR SERPL CREATININE-BSD FRML MDRD: 70.7 ML/MIN/{1.73_M2} (ref 39–?)
HGB BLD-MCNC: 12.5 G/DL (ref 12–15.5)
LYMPHOCYTES # BLD AUTO: 1.6 10^3/UL (ref 1.5–5)
LYMPHOCYTES NFR BLD AUTO: 19.2 % (ref 24–44)
MCH RBC QN AUTO: 30.6 PG (ref 27–33)
MCHC RBC AUTO-ENTMCNC: 32.9 G/DL (ref 32–36.5)
MCV RBC AUTO: 93.1 FL (ref 80–96)
MONOCYTES # BLD AUTO: 0.7 10^3/UL (ref 0–0.8)
MONOCYTES NFR BLD AUTO: 7.9 % (ref 2–8)
NEUTROPHILS NFR BLD AUTO: 70.4 % (ref 36–66)
PLATELET # BLD AUTO: 377 10^3/UL (ref 150–450)
POTASSIUM SERPL-SCNC: 4.2 MMOL/L (ref 3.5–5.1)
PROT SERPL-MCNC: 7.3 G/DL (ref 5.7–8.2)
RBC # BLD AUTO: 4.08 10^6/UL (ref 4–5.4)
WBC # BLD AUTO: 8.5 10^3/UL (ref 4–10)

## 2025-05-20 ENCOUNTER — HOSPITAL ENCOUNTER (OUTPATIENT)
Dept: HOSPITAL 53 - M LAB REF | Age: 70
End: 2025-05-20
Attending: STUDENT IN AN ORGANIZED HEALTH CARE EDUCATION/TRAINING PROGRAM
Payer: MEDICARE

## 2025-05-20 DIAGNOSIS — K50.00: Primary | ICD-10-CM
